# Patient Record
Sex: FEMALE | Race: WHITE | NOT HISPANIC OR LATINO | Employment: FULL TIME | URBAN - NONMETROPOLITAN AREA
[De-identification: names, ages, dates, MRNs, and addresses within clinical notes are randomized per-mention and may not be internally consistent; named-entity substitution may affect disease eponyms.]

---

## 2017-01-20 ENCOUNTER — OFFICE VISIT (OUTPATIENT)
Dept: FAMILY MEDICINE CLINIC | Facility: CLINIC | Age: 42
End: 2017-01-20

## 2017-01-20 VITALS
OXYGEN SATURATION: 97 % | HEART RATE: 98 BPM | SYSTOLIC BLOOD PRESSURE: 118 MMHG | DIASTOLIC BLOOD PRESSURE: 86 MMHG | WEIGHT: 232 LBS | TEMPERATURE: 97.5 F | BODY MASS INDEX: 38.65 KG/M2 | HEIGHT: 65 IN

## 2017-01-20 DIAGNOSIS — E78.49 OTHER HYPERLIPIDEMIA: ICD-10-CM

## 2017-01-20 DIAGNOSIS — J01.11 ACUTE RECURRENT FRONTAL SINUSITIS: ICD-10-CM

## 2017-01-20 DIAGNOSIS — I10 ESSENTIAL HYPERTENSION: ICD-10-CM

## 2017-01-20 DIAGNOSIS — M79.7 FIBROMYALGIA: Primary | ICD-10-CM

## 2017-01-20 PROCEDURE — 99214 OFFICE O/P EST MOD 30 MIN: CPT | Performed by: FAMILY MEDICINE

## 2017-01-20 RX ORDER — ATORVASTATIN CALCIUM 10 MG/1
10 TABLET, FILM COATED ORAL NIGHTLY
Qty: 30 TABLET | Refills: 5 | Status: SHIPPED | OUTPATIENT
Start: 2017-01-20 | End: 2017-06-05 | Stop reason: SDUPTHER

## 2017-01-20 RX ORDER — AMOXICILLIN AND CLAVULANATE POTASSIUM 500; 125 MG/1; MG/1
1 TABLET, FILM COATED ORAL 2 TIMES DAILY
Qty: 30 TABLET | Refills: 0 | Status: SHIPPED | OUTPATIENT
Start: 2017-01-20 | End: 2017-02-21

## 2017-01-20 RX ORDER — TIZANIDINE HYDROCHLORIDE 6 MG/1
6 CAPSULE, GELATIN COATED ORAL 3 TIMES DAILY PRN
Qty: 90 CAPSULE | Refills: 2 | Status: SHIPPED | OUTPATIENT
Start: 2017-01-20 | End: 2017-02-21

## 2017-01-20 RX ORDER — DULOXETIN HYDROCHLORIDE 30 MG/1
30 CAPSULE, DELAYED RELEASE ORAL DAILY
Qty: 30 CAPSULE | Refills: 0 | Status: SHIPPED | OUTPATIENT
Start: 2017-01-20 | End: 2017-02-21 | Stop reason: SINTOL

## 2017-01-20 RX ORDER — MELOXICAM 15 MG/1
15 TABLET ORAL DAILY
Qty: 30 TABLET | Refills: 0 | Status: SHIPPED | OUTPATIENT
Start: 2017-01-20 | End: 2017-02-21 | Stop reason: SDUPTHER

## 2017-01-20 NOTE — PROGRESS NOTES
"Subjective   Madalyn Villegas is a 41 y.o. female.     Sinus Problem   This is a recurrent problem. The current episode started more than 1 month ago. The problem has been gradually worsening since onset. There has been no fever. The pain is moderate. Associated symptoms include congestion, coughing, headaches and sinus pressure. Pertinent negatives include no chills, diaphoresis, ear pain, hoarse voice, neck pain, shortness of breath, sneezing, sore throat or swollen glands. (Rhinorrhea) Past treatments include acetaminophen (OTC cold meds). The treatment provided no relief.     H/o HTN and HLP. Taking meds as rx'd. Denies side effects.    Reports sciatic symptoms have improved. Still having diffuse muscle aches/pains, joint pain. Every day, assoc'd with muscle spasm, fatigue. Sleeping well. Zanaflex and ibuprofen help some.  TAking vit D for deficiency as rx'd.  She reports using \"biofreeze constantly\".      The following portions of the patient's history were reviewed and updated as appropriate: allergies, current medications, past family history, past medical history, past social history and past surgical history.    Review of Systems   Constitutional: Positive for fatigue and unexpected weight change. Negative for chills and diaphoresis.   HENT: Positive for congestion, postnasal drip, rhinorrhea and sinus pressure. Negative for ear pain, hoarse voice, mouth sores, nosebleeds, sneezing, sore throat and trouble swallowing.    Eyes: Negative for pain, redness, itching and visual disturbance.   Respiratory: Positive for cough. Negative for shortness of breath and wheezing.    Cardiovascular: Negative for chest pain, palpitations and leg swelling.   Gastrointestinal: Negative for abdominal pain, diarrhea, nausea and vomiting.   Endocrine: Negative for polydipsia and polyuria.   Genitourinary: Negative for dysuria and hematuria.   Musculoskeletal: Positive for arthralgias, back pain and myalgias. Negative for neck " pain.   Skin: Negative for rash and wound.   Neurological: Positive for headaches. Negative for dizziness, tremors, syncope and weakness.   Hematological: Negative for adenopathy. Does not bruise/bleed easily.   Psychiatric/Behavioral: Negative for dysphoric mood and sleep disturbance. The patient is not nervous/anxious.      Objective    Vitals:    01/20/17 0827   BP: 118/86   Pulse: 98   Temp: 97.5 °F (36.4 °C)   SpO2: 97%     Body mass index is 38.61 kg/(m^2).  Last 2 weights    01/20/17 0827   Weight: 232 lb (105 kg)     Physical Exam   Constitutional: She is oriented to person, place, and time. She appears well-developed and well-nourished. She is cooperative. She does not appear ill. No distress.   HENT:   Head: Normocephalic and atraumatic.   Right Ear: Tympanic membrane, external ear and ear canal normal.   Left Ear: Tympanic membrane, external ear and ear canal normal.   Nose: Mucosal edema and rhinorrhea (mucoid) present. Right sinus exhibits frontal sinus tenderness. Left sinus exhibits frontal sinus tenderness.   Mouth/Throat: Uvula is midline, oropharynx is clear and moist and mucous membranes are normal.   Eyes: Conjunctivae and lids are normal.   Neck: Phonation normal. Neck supple. Normal carotid pulses present. No thyroid mass and no thyromegaly present.   Cardiovascular: Normal rate, regular rhythm and intact distal pulses.    Pulmonary/Chest: Effort normal and breath sounds normal.   Musculoskeletal:   Multiple soft tissue tender points including upper ant chest, shoulders, elbows, paraspinals in cervical/lumbar spine, lateral hips/thighs, knees, shins. Overall normal ROM with mild stiffness.       Vascular Status -  Her exam exhibits no right foot edema. Her exam exhibits no left foot edema.  Lymphadenopathy:     She has no cervical adenopathy.   Neurological: She is alert and oriented to person, place, and time. She has normal strength and normal reflexes. She displays no tremor. No cranial  nerve deficit or sensory deficit. Gait normal.   Skin: Skin is warm and dry. No bruising and no rash noted.   Psychiatric: She has a normal mood and affect. Her behavior is normal. Cognition and memory are normal.   Nursing note and vitals reviewed.    Assessment/Plan   Madalyn was seen today for nasal congestion and med refill.    Diagnoses and all orders for this visit:    Fibromyalgia  -     DULoxetine (CYMBALTA) 30 MG capsule; Take 1 capsule by mouth Daily.  -     meloxicam (MOBIC) 15 MG tablet; Take 1 tablet by mouth Daily.    Acute recurrent frontal sinusitis  -     amoxicillin-clavulanate (AUGMENTIN) 500-125 MG per tablet; Take 1 tablet by mouth 2 (Two) Times a Day.    Essential hypertension    Other hyperlipidemia    Other orders  -     TiZANidine (ZANAFLEX) 6 MG capsule; Take 1 capsule by mouth 3 (Three) Times a Day As Needed for muscle spasms.  -     atorvastatin (LIPITOR) 10 MG tablet; Take 1 tablet by mouth Every Night.    Suspect she has fibromyalgia. Risks/benefit and potential side effects of tx options reviewed. She voiced understanding and wishes to proceed with Cymbalta, zanaflex, and mobic.  If minimal response to Cymbalta consider referral to rheumatology regarding alternative dx.  BP controlled.  HLP controlled with good tolerance of Lipitor.  Symptomatic tx of sinusitis reviewed.  F/U in 1 month, sooner as needed.  Patient was encouraged to keep me informed of any acute changes, lack of improvement, or any new concerning symptoms.  Patient voiced understanding of all instructions and denied further questions.

## 2017-01-20 NOTE — MR AVS SNAPSHOT
Madalyn Villegas   1/20/2017 8:15 AM   Office Visit    Dept Phone:  [phone]   Encounter #:  [id number]    Provider:  Latrice Santana MD   Department:  [facility] PRIMARY CARE                Your Full Care Plan              Today's Medication Changes          These changes are accurate as of: 1/20/17  9:00 AM.  If you have any questions, ask your nurse or doctor.               New Medication(s)Ordered:     DULoxetine 30 MG capsule   Commonly known as:  CYMBALTA   Take 1 capsule by mouth Daily.   Started by:  Latrice Santana MD       meloxicam 15 MG tablet   Commonly known as:  MOBIC   Take 1 tablet by mouth Daily.   Started by:  Latrice Santana MD         Stop taking medication(s)listed here:     ibuprofen 200 MG tablet   Commonly known as:  ADVIL,MOTRIN   Stopped by:  Latrice Santana MD           MUCUS RELIEF 400 MG tablet   Generic drug:  guaiFENesin   Stopped by:  Latrice Santana MD           traMADol 50 MG tablet   Commonly known as:  ULTRAM   Stopped by:  Latrice Santana MD                Where to Get Your Medications      These medications were sent to [facility] [address] [place] [address] [phone]  - [phone]   [address] [facility] [address]     Phone:  [phone]     amoxicillin-clavulanate 500-125 MG per tablet    atorvastatin 10 MG tablet    DULoxetine 30 MG capsule    meloxicam 15 MG tablet    TiZANidine 6 MG capsule                  Your Updated Medication List          This list is accurate as of: 1/20/17  9:00 AM.  Always use your most recent med list.                acetaminophen 500 MG tablet   Commonly known as:  TYLENOL       albuterol 108 (90 BASE) MCG/ACT inhaler   Commonly known as:  PROVENTIL HFA;VENTOLIN HFA   Inhale 2 puffs every 4 (four) hours as needed for wheezing.       amoxicillin-clavulanate 500-125 MG per tablet   Commonly known as:  AUGMENTIN   Take 1 tablet by mouth 2 (Two) Times a Day.       atorvastatin  10 MG tablet   Commonly known as:  LIPITOR   Take 1 tablet by mouth Every Night.       chlorthalidone 25 MG tablet   Commonly known as:  HYGROTON   Take 1 tablet by mouth Daily.       cholecalciferol 59903 UNITS capsule   Commonly known as:  VITAMIN D3   Take 1 capsule by mouth Daily.       DULoxetine 30 MG capsule   Commonly known as:  CYMBALTA   Take 1 capsule by mouth Daily.       esomeprazole 20 MG capsule   Commonly known as:  nexIUM       glucosamine-chondroitin 500-400 MG capsule capsule       meloxicam 15 MG tablet   Commonly known as:  MOBIC   Take 1 tablet by mouth Daily.       NASAL DECONGESTANT PO       NASONEX 50 MCG/ACT nasal spray   Generic drug:  mometasone       ONE DAILY ESSENTIAL PO       TiZANidine 6 MG capsule   Commonly known as:  ZANAFLEX   Take 1 capsule by mouth 3 (Three) Times a Day As Needed for muscle spasms.               You Were Diagnosed With        Codes Comments    Fibromyalgia    -  Primary ICD-10-CM: M79.7  ICD-9-CM: 729.1     Acute recurrent frontal sinusitis     ICD-10-CM: J01.11  ICD-9-CM: 461.1       Instructions     None    Patient Instructions History      Upcoming Appointments     Visit Type Date Time Department    FOLLOW UP 2017  8:15 AM [facility]      [employer] Signup     [employer] allows you to send messages to your doctor, view your test results, renew your prescriptions, schedule appointments, and more. To sign up, go to [employer] and click on the Sign Up Now link in the New User? box. Enter your [employer] Activation Code exactly as it appears below along with the last four digits of your Social Security Number and your Date of Birth () to complete the sign-up process. If you do not sign up before the expiration date, you must request a new code.    [employer] Activation Code: [email or web address]  Expires: 2/3/2017  9:00 AM    If you have questions, you can email [email or web address] or call [phone] to talk to our [employer]  "staff. Remember, MyChart is NOT to be used for urgent needs. For medical emergencies, dial 911.               Other Info from Your Visit           Allergies     No Known Allergies      Reason for Visit     Nasal Congestion sinus pressure    Med Refill           Vital Signs     Blood Pressure Pulse Temperature Height Weight Oxygen Saturation    118/86 98 97.5 °F (36.4 °C) 65\" (165.1 cm) 232 lb (105 kg) 97%    Body Mass Index Smoking Status                38.61 kg/m2 Current Every Day Smoker          Problems and Diagnoses Noted     Fibromyalgia    Acute frontal sinusitis            "

## 2017-02-21 ENCOUNTER — OFFICE VISIT (OUTPATIENT)
Dept: FAMILY MEDICINE CLINIC | Facility: CLINIC | Age: 42
End: 2017-02-21

## 2017-02-21 VITALS
WEIGHT: 234 LBS | HEIGHT: 65 IN | BODY MASS INDEX: 38.99 KG/M2 | HEART RATE: 92 BPM | DIASTOLIC BLOOD PRESSURE: 80 MMHG | SYSTOLIC BLOOD PRESSURE: 112 MMHG | OXYGEN SATURATION: 97 %

## 2017-02-21 DIAGNOSIS — R51.9 FACIAL PAIN: ICD-10-CM

## 2017-02-21 DIAGNOSIS — M79.7 FIBROMYALGIA: Primary | ICD-10-CM

## 2017-02-21 DIAGNOSIS — R09.81 CHRONIC NASAL CONGESTION: ICD-10-CM

## 2017-02-21 DIAGNOSIS — E78.49 OTHER HYPERLIPIDEMIA: ICD-10-CM

## 2017-02-21 DIAGNOSIS — I10 ESSENTIAL HYPERTENSION: ICD-10-CM

## 2017-02-21 DIAGNOSIS — E55.9 VITAMIN D DEFICIENCY: ICD-10-CM

## 2017-02-21 LAB
25(OH)D3+25(OH)D2 SERPL-MCNC: 40.4 NG/ML
ALBUMIN SERPL-MCNC: 4.5 G/DL (ref 3.5–5)
ALBUMIN/GLOB SERPL: 1.7 G/DL (ref 1–2)
ALP SERPL-CCNC: 82 U/L (ref 38–126)
ALT SERPL-CCNC: 41 U/L (ref 13–69)
AST SERPL-CCNC: 35 U/L (ref 15–46)
BILIRUB SERPL-MCNC: 0.7 MG/DL (ref 0.2–1.3)
BUN SERPL-MCNC: 15 MG/DL (ref 7–20)
BUN/CREAT SERPL: 25 (ref 7.1–23.5)
CALCIUM SERPL-MCNC: 10.5 MG/DL (ref 8.4–10.2)
CHLORIDE SERPL-SCNC: 102 MMOL/L (ref 98–107)
CHOLEST SERPL-MCNC: 188 MG/DL (ref 0–199)
CO2 SERPL-SCNC: 30 MMOL/L (ref 26–30)
CREAT SERPL-MCNC: 0.6 MG/DL (ref 0.6–1.3)
GLOBULIN SER CALC-MCNC: 2.7 GM/DL
GLUCOSE SERPL-MCNC: 101 MG/DL (ref 74–98)
HDLC SERPL-MCNC: 47 MG/DL (ref 40–60)
LDLC SERPL CALC-MCNC: 86 MG/DL (ref 0–99)
POTASSIUM SERPL-SCNC: 4.3 MMOL/L (ref 3.5–5.1)
PROT SERPL-MCNC: 7.2 G/DL (ref 6.3–8.2)
SODIUM SERPL-SCNC: 142 MMOL/L (ref 137–145)
TRIGL SERPL-MCNC: 276 MG/DL
VLDLC SERPL CALC-MCNC: 55.2 MG/DL

## 2017-02-21 PROCEDURE — 99214 OFFICE O/P EST MOD 30 MIN: CPT | Performed by: FAMILY MEDICINE

## 2017-02-21 RX ORDER — MELOXICAM 15 MG/1
15 TABLET ORAL DAILY
Qty: 30 TABLET | Refills: 0 | Status: SHIPPED | OUTPATIENT
Start: 2017-02-21 | End: 2017-05-22 | Stop reason: SDUPTHER

## 2017-02-21 RX ORDER — CYCLOBENZAPRINE HCL 10 MG
10 TABLET ORAL 3 TIMES DAILY PRN
Qty: 90 TABLET | Refills: 0 | Status: SHIPPED | OUTPATIENT
Start: 2017-02-21 | End: 2017-05-22

## 2017-02-21 NOTE — PROGRESS NOTES
Subjective   Madalyn Villegas is a 41 y.o. female.     History of Present Illness   Fibromyalgia: Patient here for follow up on fibromyalgia. Patient has a several months history of diffuse myofascial pain and fatigue: moderate: course over time: gradually worsening..  Onset was gradual. The symptoms are of moderate severity. They are made worse by: movement and overuse. They are helped by arthritis medications, heat, rest and muscle relaxants. The patient denies fever, truly inflamed joints, rash, localizing neurologic symptoms, unexplained weight loss. Previous treatments include OTC meds, prescription meds - see below, Physical Therapy and chiropractic therapy. Associated symptoms include arthralgia, fatigue, joint pain, morning stiffness and muscle weakness. Patient denies associated alopecia, bleeding/clotting problems, fevers, memory loss, nausea, new headache, nodules, oral ulcers, palpitations, pleurisy, rashes/photosensitive, Raynaud's and seizures. Evaluation to date includes lab eval. Recent interventions include medication changes, PT, etc.  Limitation on activities include none. At last visit was placed on CYmbalta. She does not feel it has helped her muscle pain and is causing diarrhea. sHe would like to stop it.  Also using mobic as needed which helps. Would like alternative muscle relaxant as tizanidine helps spasms but not generally soreness/tightness.    Has h/o vit D def. Is taking as rx'd.    Hypertension: Patient here for follow-up of elevated blood pressure. She is intermittently exercising and is fairly adherent to low salt diet.  Blood pressure is well controlled at home. Cardiac symptoms fatigue. Patient denies chest pain, claudication, cough, dyspnea, exertional chest pressure/discomfort, irregular heart beat, lower extremity edema, orthopnea, palpitations, syncope and tachypnea.  Cardiovascular risk factors: dyslipidemia, hypertension, obesity (BMI >= 30 kg/m2) and sedentary lifestyle.  "Use of agents associated with hypertension: NSAIDS. History of target organ damage: none. Taking meds as rx'd.    Hyperlipidemia: Patient presents for f/u of hyperlipidemia.  She was tested because of comorbid hypertension, obesity.  Her last labs reviewed on chart. Previously tx included heart healthy diet and increased exercise. She has not complied with these recommendations. Cardiac symptoms as above. There is a family history of hyperlipidemia. There is a family history of early ischemia heart disease.    She c/o cont'd sinus \"pressure\", congestion, post nasal drip. Worse with pressure changes.    The following portions of the patient's history were reviewed and updated as appropriate: allergies, current medications, past family history, past medical history, past social history, past surgical history and problem list.    Review of Systems   Constitutional: Positive for fatigue. Negative for chills and diaphoresis.   HENT: Positive for congestion, postnasal drip, rhinorrhea and sinus pressure. Negative for ear pain, mouth sores, nosebleeds, sneezing, sore throat and trouble swallowing.    Eyes: Positive for pain and visual disturbance. Negative for redness and itching.   Respiratory: Positive for cough. Negative for shortness of breath and wheezing.    Cardiovascular: Negative for chest pain, palpitations and leg swelling.   Gastrointestinal: Negative for abdominal pain, diarrhea, nausea and vomiting.   Endocrine: Negative for polydipsia and polyuria.   Genitourinary: Negative for dysuria and hematuria.   Musculoskeletal: Positive for arthralgias, back pain and myalgias. Negative for neck pain.   Skin: Negative for rash and wound.   Neurological: Positive for headaches. Negative for dizziness, tremors, syncope and weakness.   Hematological: Negative for adenopathy. Does not bruise/bleed easily.   Psychiatric/Behavioral: Negative for dysphoric mood and sleep disturbance. The patient is not nervous/anxious.  "     Objective    Vitals:    02/21/17 0810   BP: 112/80   Pulse: 92   SpO2: 97%     Body mass index is 38.94 kg/(m^2).  Last 2 weights    02/21/17 0810   Weight: 234 lb (106 kg)     Physical Exam   Constitutional: She is oriented to person, place, and time. She appears well-developed and well-nourished. She is cooperative. She does not appear ill. No distress.   HENT:   Head: Normocephalic and atraumatic.   Right Ear: Tympanic membrane, external ear and ear canal normal.   Left Ear: Tympanic membrane, external ear and ear canal normal.   Nose: Mucosal edema present. No rhinorrhea. Right sinus exhibits maxillary sinus tenderness and frontal sinus tenderness. Left sinus exhibits maxillary sinus tenderness and frontal sinus tenderness.   Mouth/Throat: Uvula is midline, oropharynx is clear and moist and mucous membranes are normal.   Eyes: Conjunctivae, EOM and lids are normal. Pupils are equal, round, and reactive to light.   Neck: Phonation normal. Neck supple. Normal carotid pulses present. No thyroid mass and no thyromegaly present.   Cardiovascular: Normal rate, regular rhythm and intact distal pulses.    Pulmonary/Chest: Effort normal and breath sounds normal.   Musculoskeletal:   Multiple soft tissue tender points including upper ant chest, shoulders, elbows, paraspinals in cervical/lumbar spine, lateral hips/thighs, knees, shins. Overall normal ROM with mild stiffness.       Vascular Status -  Her exam exhibits no right foot edema. Her exam exhibits no left foot edema.  Lymphadenopathy:     She has no cervical adenopathy.   Neurological: She is alert and oriented to person, place, and time. She has normal strength. She displays no tremor. No cranial nerve deficit or sensory deficit. Gait normal.   Skin: Skin is warm and dry. No bruising and no rash noted.   Psychiatric: She has a normal mood and affect. Her behavior is normal. Cognition and memory are normal.   Nursing note and vitals  reviewed.    Assessment/Plan   Madalyn was seen today for muscle pain and sinus problem.    Diagnoses and all orders for this visit:    Fibromyalgia  -     cyclobenzaprine (FLEXERIL) 10 MG tablet; Take 1 tablet by mouth 3 (Three) Times a Day As Needed for muscle spasms.  -     meloxicam (MOBIC) 15 MG tablet; Take 1 tablet by mouth Daily.    Essential hypertension  -     Comprehensive Metabolic Panel    Vitamin D deficiency  -     Vitamin D 25 Hydroxy    Other hyperlipidemia  -     Lipid Panel  -     Comprehensive Metabolic Panel    Facial pain  -     XR Sinuses 3+ View; Future    Chronic nasal congestion  -     XR Sinuses 3+ View; Future    Trial of flexeril in place of tizanidine. Risks, benefits, and potential side effects of new medications reviewed with patient.  Patient voiced understanding and wished to proceed with treatment.  Otherwise continue current medications.  I will contact patient regarding test results and provide instructions regarding any necessary changes in plan of care.  Patient was encouraged to keep me informed of any acute changes, lack of improvement, or any new concerning symptoms.  Routine f/u in 3 months, sooner as needed/instructed.  Patient voiced understanding of all instructions and denied further questions.

## 2017-04-24 DIAGNOSIS — I10 ESSENTIAL HYPERTENSION: ICD-10-CM

## 2017-04-24 RX ORDER — CHLORTHALIDONE 25 MG/1
TABLET ORAL
Qty: 30 TABLET | Refills: 0 | Status: SHIPPED | OUTPATIENT
Start: 2017-04-24 | End: 2017-05-22 | Stop reason: SDUPTHER

## 2017-05-22 ENCOUNTER — OFFICE VISIT (OUTPATIENT)
Dept: FAMILY MEDICINE CLINIC | Facility: CLINIC | Age: 42
End: 2017-05-22

## 2017-05-22 VITALS
TEMPERATURE: 97.5 F | DIASTOLIC BLOOD PRESSURE: 78 MMHG | WEIGHT: 235 LBS | HEART RATE: 98 BPM | HEIGHT: 65 IN | SYSTOLIC BLOOD PRESSURE: 112 MMHG | BODY MASS INDEX: 39.15 KG/M2 | OXYGEN SATURATION: 97 %

## 2017-05-22 DIAGNOSIS — J98.8 RECURRENT RESPIRATORY INFECTION: ICD-10-CM

## 2017-05-22 DIAGNOSIS — J01.80 ACUTE NON-RECURRENT SINUSITIS OF OTHER SINUS: ICD-10-CM

## 2017-05-22 DIAGNOSIS — E78.49 OTHER HYPERLIPIDEMIA: ICD-10-CM

## 2017-05-22 DIAGNOSIS — Z72.0 TOBACCO ABUSE: ICD-10-CM

## 2017-05-22 DIAGNOSIS — J32.9 RECURRENT RHINOSINUSITIS: ICD-10-CM

## 2017-05-22 DIAGNOSIS — I10 ESSENTIAL HYPERTENSION: ICD-10-CM

## 2017-05-22 DIAGNOSIS — M79.7 FIBROMYALGIA: ICD-10-CM

## 2017-05-22 DIAGNOSIS — J20.8 ACUTE BRONCHITIS DUE TO OTHER SPECIFIED ORGANISMS: Primary | ICD-10-CM

## 2017-05-22 DIAGNOSIS — E55.9 VITAMIN D DEFICIENCY: ICD-10-CM

## 2017-05-22 DIAGNOSIS — R73.01 IFG (IMPAIRED FASTING GLUCOSE): ICD-10-CM

## 2017-05-22 PROCEDURE — 96372 THER/PROPH/DIAG INJ SC/IM: CPT | Performed by: FAMILY MEDICINE

## 2017-05-22 PROCEDURE — 99214 OFFICE O/P EST MOD 30 MIN: CPT | Performed by: FAMILY MEDICINE

## 2017-05-22 RX ORDER — DOXYCYCLINE 100 MG/1
100 TABLET ORAL 2 TIMES DAILY
Qty: 20 TABLET | Refills: 0 | Status: SHIPPED | OUTPATIENT
Start: 2017-05-22 | End: 2017-06-01

## 2017-05-22 RX ORDER — TIZANIDINE HYDROCHLORIDE 6 MG/1
6 CAPSULE, GELATIN COATED ORAL 2 TIMES DAILY
Qty: 60 CAPSULE | Refills: 5 | Status: SHIPPED | OUTPATIENT
Start: 2017-05-22 | End: 2017-12-11 | Stop reason: SDUPTHER

## 2017-05-22 RX ORDER — CHLORTHALIDONE 25 MG/1
25 TABLET ORAL DAILY
Qty: 30 TABLET | Refills: 5 | Status: SHIPPED | OUTPATIENT
Start: 2017-05-22 | End: 2017-11-19 | Stop reason: SDUPTHER

## 2017-05-22 RX ORDER — METHYLPREDNISOLONE ACETATE 80 MG/ML
120 INJECTION, SUSPENSION INTRA-ARTICULAR; INTRALESIONAL; INTRAMUSCULAR; SOFT TISSUE ONCE
Status: COMPLETED | OUTPATIENT
Start: 2017-05-22 | End: 2017-05-22

## 2017-05-22 RX ORDER — TIZANIDINE HYDROCHLORIDE 6 MG/1
CAPSULE, GELATIN COATED ORAL
COMMUNITY
Start: 2017-04-25 | End: 2017-05-22 | Stop reason: SDUPTHER

## 2017-05-22 RX ORDER — MELOXICAM 15 MG/1
15 TABLET ORAL DAILY
Qty: 30 TABLET | Refills: 5 | Status: SHIPPED | OUTPATIENT
Start: 2017-05-22 | End: 2017-11-27 | Stop reason: SDUPTHER

## 2017-05-22 RX ADMIN — METHYLPREDNISOLONE ACETATE 120 MG: 80 INJECTION, SUSPENSION INTRA-ARTICULAR; INTRALESIONAL; INTRAMUSCULAR; SOFT TISSUE at 09:05

## 2017-05-23 LAB
25(OH)D3+25(OH)D2 SERPL-MCNC: 44.3 NG/ML
CHOLEST SERPL-MCNC: 198 MG/DL (ref 0–199)
HBA1C MFR BLD: 5.7 %
HDLC SERPL-MCNC: 46 MG/DL (ref 40–60)
LDLC SERPL CALC-MCNC: 99 MG/DL (ref 0–99)
TRIGL SERPL-MCNC: 265 MG/DL
VLDLC SERPL CALC-MCNC: 53 MG/DL

## 2017-05-29 ENCOUNTER — OFFICE VISIT (OUTPATIENT)
Dept: RETAIL CLINIC | Facility: CLINIC | Age: 42
End: 2017-05-29

## 2017-05-29 VITALS
WEIGHT: 230 LBS | SYSTOLIC BLOOD PRESSURE: 140 MMHG | HEART RATE: 90 BPM | TEMPERATURE: 97.5 F | DIASTOLIC BLOOD PRESSURE: 86 MMHG | BODY MASS INDEX: 38.32 KG/M2 | OXYGEN SATURATION: 97 % | HEIGHT: 65 IN | RESPIRATION RATE: 18 BRPM

## 2017-05-29 DIAGNOSIS — R42 LIGHTHEADEDNESS: ICD-10-CM

## 2017-05-29 DIAGNOSIS — J01.40 ACUTE PANSINUSITIS, RECURRENCE NOT SPECIFIED: Primary | ICD-10-CM

## 2017-05-29 LAB
EXPIRATION DATE: NORMAL
GLUCOSE BLDC GLUCOMTR-MCNC: 74 MG/DL (ref 70–130)
INTERNAL CONTROL: NORMAL
Lab: NORMAL
S PYO AG THROAT QL: NEGATIVE

## 2017-05-29 PROCEDURE — 99213 OFFICE O/P EST LOW 20 MIN: CPT | Performed by: NURSE PRACTITIONER

## 2017-05-29 PROCEDURE — 87880 STREP A ASSAY W/OPTIC: CPT | Performed by: NURSE PRACTITIONER

## 2017-05-29 PROCEDURE — 82962 GLUCOSE BLOOD TEST: CPT | Performed by: NURSE PRACTITIONER

## 2017-05-29 RX ORDER — PREDNISONE 10 MG/1
TABLET ORAL
Qty: 21 TABLET | Refills: 0 | Status: SHIPPED | OUTPATIENT
Start: 2017-05-29 | End: 2017-06-05

## 2017-06-05 ENCOUNTER — OFFICE VISIT (OUTPATIENT)
Dept: FAMILY MEDICINE CLINIC | Facility: CLINIC | Age: 42
End: 2017-06-05

## 2017-06-05 VITALS
WEIGHT: 238 LBS | SYSTOLIC BLOOD PRESSURE: 124 MMHG | BODY MASS INDEX: 39.65 KG/M2 | DIASTOLIC BLOOD PRESSURE: 78 MMHG | HEART RATE: 86 BPM | OXYGEN SATURATION: 98 % | HEIGHT: 65 IN

## 2017-06-05 DIAGNOSIS — J98.01 BRONCHOSPASM: ICD-10-CM

## 2017-06-05 DIAGNOSIS — I10 ESSENTIAL HYPERTENSION: ICD-10-CM

## 2017-06-05 DIAGNOSIS — J98.8 RECURRENT RESPIRATORY INFECTION: Primary | ICD-10-CM

## 2017-06-05 DIAGNOSIS — R73.01 IFG (IMPAIRED FASTING GLUCOSE): ICD-10-CM

## 2017-06-05 DIAGNOSIS — M79.7 FIBROMYALGIA: ICD-10-CM

## 2017-06-05 DIAGNOSIS — E78.49 OTHER HYPERLIPIDEMIA: ICD-10-CM

## 2017-06-05 LAB
FEV1/FVC: 108 %
FEV1: (no result) LITERS
FVC VOL RESPIRATORY: (no result) LITERS

## 2017-06-05 PROCEDURE — 99214 OFFICE O/P EST MOD 30 MIN: CPT | Performed by: FAMILY MEDICINE

## 2017-06-05 PROCEDURE — 94010 BREATHING CAPACITY TEST: CPT | Performed by: FAMILY MEDICINE

## 2017-06-05 RX ORDER — ATORVASTATIN CALCIUM 10 MG/1
10 TABLET, FILM COATED ORAL NIGHTLY
Qty: 30 TABLET | Refills: 5 | Status: SHIPPED | OUTPATIENT
Start: 2017-06-05 | End: 2017-11-27 | Stop reason: SDUPTHER

## 2017-06-05 ASSESSMENT — PULMONARY FUNCTION TESTS: FEV1/FVC: 108

## 2017-06-05 NOTE — PROGRESS NOTES
Subjective   Madalyn Villegas is a 42 y.o. female.     History of Present Illness   Ms. Villegas presents today for f/u on several chronic conditions.    Fibromyalgia: Patient here for follow up on fibromyalgia. Patient has a several months history of diffuse myofascial pain and fatigue: moderate: course over time: gradually worsening.. Onset was gradual. The symptoms are of moderate severity. They are made worse by: movement and overuse. They are helped by arthritis medications, heat, rest and muscle relaxants. The patient denies fever, truly inflamed joints, rash, localizing neurologic symptoms, unexplained weight loss. Previous treatments include OTC meds, prescription meds - see below, Physical Therapy and chiropractic therapy. Associated symptoms include arthralgia, fatigue, joint pain, morning stiffness and muscle weakness. Patient denies associated alopecia, bleeding/clotting problems, fevers, memory loss, nausea, new headache, nodules, oral ulcers, palpitations, pleurisy, rashes/photosensitive, Raynaud's and seizures. Evaluation to date includes lab eval. Recent interventions include medication changes, PT, etc. Limitation on activities include none. Has failed CYmbalta due to diarrhea. Also using mobic and muscle relaxant as needed which helps.  Has h/o vit D def. Is taking as rx'd.      Hypertension: Patient here for follow-up of elevated blood pressure. She is intermittently exercising and is fairly adherent to low salt diet. Blood pressure is well controlled at home. Cardiac symptoms fatigue. Patient denies chest pain, claudication, cough, dyspnea, exertional chest pressure/discomfort, irregular heart beat, lower extremity edema, orthopnea, palpitations, syncope and tachypnea. Cardiovascular risk factors: dyslipidemia, hypertension, obesity (BMI >= 30 kg/m2) and sedentary lifestyle. Use of agents associated with hypertension: NSAIDS. History of target organ damage: none. Taking meds as rx'd. Assoc'd  pre-diabetes.      Hyperlipidemia: Patient presents for f/u of hyperlipidemia. She was tested because of comorbid hypertension, obesity. Her last labs reviewed on chart. Previously tx included heart healthy diet and increased exercise. She has not complied with these recommendations. Cardiac symptoms as above. There is a family history of hyperlipidemia. There is a family history of early ischemia heart disease. SHe is taking statin as rx'd. Denies side effects.    She continues to have intermittent dry cough. Was encouraged to f/u for PFT. Has recurrent bronchitis/URI/sinusitis episodes at least 3-4 per year assoc'd with marked bronchospasm. She denies h/o asthma, COPD. Nephew with asthma. C/o chronic nasal congestion, postnasal drip. Has night-time cough which waxes/wanes. Has apt later this month with allergist.    The following portions of the patient's history were reviewed and updated as appropriate: allergies, current medications, past family history, past medical history, past social history, past surgical history and problem list.    Review of Systems   Constitutional: Positive for fatigue. Negative for fever and unexpected weight change.   HENT: Positive for congestion, nosebleeds, postnasal drip and rhinorrhea. Negative for ear pain, mouth sores, sore throat and trouble swallowing.    Eyes: Positive for itching. Negative for discharge.   Respiratory: Positive for cough, shortness of breath (with exertion) and wheezing (mild).    Cardiovascular: Positive for leg swelling. Negative for chest pain and palpitations.   Gastrointestinal: Negative.    Genitourinary: Negative for dysuria and hematuria.   Musculoskeletal: Positive for arthralgias and myalgias.   Skin: Negative for rash and wound.   Allergic/Immunologic: Positive for environmental allergies.   Neurological: Positive for numbness (burning in bilateral feet) and headaches. Negative for syncope and weakness.   Hematological: Negative for adenopathy.  Bruises/bleeds easily.   Psychiatric/Behavioral: Negative.        Objective    Vitals:    06/05/17 1111   BP: 124/78   Pulse: 86   SpO2: 98%     Body mass index is 39.61 kg/(m^2).  Last 2 weights    06/05/17  1111   Weight: 238 lb (108 kg)       Physical Exam   Constitutional: She is oriented to person, place, and time. She appears well-developed and well-nourished. She is cooperative. She does not appear ill. No distress.   HENT:   Head: Normocephalic and atraumatic.   Right Ear: Tympanic membrane, external ear and ear canal normal.   Left Ear: Tympanic membrane, external ear and ear canal normal.   Nose: Mucosal edema present. No rhinorrhea.   Mouth/Throat: Oropharynx is clear and moist and mucous membranes are normal. No oral lesions. No posterior oropharyngeal erythema.   Eyes: Conjunctivae, EOM and lids are normal.   Neck: Phonation normal. Neck supple. Normal carotid pulses present. No thyroid mass and no thyromegaly present.   Cardiovascular: Normal rate, regular rhythm and intact distal pulses.    Pulmonary/Chest: Effort normal and breath sounds normal.   Musculoskeletal:   Multiple soft tissue tender points including upper ant chest, shoulders, elbows, paraspinals in cervical/lumbar spine, lateral hips/thighs, knees, shins. Overall normal ROM with mild stiffness.       Vascular Status -  Her exam exhibits no right foot edema. Her exam exhibits no left foot edema.  Lymphadenopathy:     She has no cervical adenopathy.   Neurological: She is alert and oriented to person, place, and time. She has normal strength. She displays no tremor. No cranial nerve deficit or sensory deficit. Gait normal.   Skin: Skin is warm and dry. No bruising and no rash noted.   Psychiatric: She has a normal mood and affect. Her behavior is normal. Cognition and memory are normal.   Nursing note and vitals reviewed.    Spirometry results reviewed with pt and scanned to chart. FEV1 normal.    Recent Results (from the past 672 hour(s))    Hemoglobin A1c    Collection Time: 05/22/17  9:06 AM   Result Value Ref Range    Hemoglobin A1C 5.70 %   Lipid Panel    Collection Time: 05/22/17  9:06 AM   Result Value Ref Range    Total Cholesterol 198 0 - 199 mg/dL    Triglycerides 265 (H) <150 mg/dL    HDL Cholesterol 46 40 - 60 mg/dL    VLDL Cholesterol 53 mg/dL    LDL Cholesterol  99 0 - 99 mg/dL   Vitamin D 25 Hydroxy    Collection Time: 05/22/17  9:06 AM   Result Value Ref Range    25 Hydroxy, Vitamin D 44.3 ng/mL   POC Rapid Strep A    Collection Time: 05/29/17 10:39 AM   Result Value Ref Range    Rapid Strep A Screen Negative Negative, VALID, INVALID, Not Performed    Internal Control Passed Passed    Lot Number [id number]     Expiration Date 09/30/18    POC Glucose Fingerstick    Collection Time: 05/29/17 10:39 AM   Result Value Ref Range    Glucose 74 70 - 130 mg/dL     Labs reviewed with pt.    Assessment/Plan   Madalyn was seen today for hypertension, hyperlipidemia and prediabetes.    Diagnoses and all orders for this visit:    Essential hypertension    Other hyperlipidemia    IFG (impaired fasting glucose)    Fibromyalgia    Recurrent respiratory infection  -     Pulmonary Function Test    Bronchospasm  -     Pulmonary Function Test    Other orders  -     atorvastatin (LIPITOR) 10 MG tablet; Take 1 tablet by mouth Every Night.    BP controlled.  HLP with good tolerance of statin.  Prediabetes. Nutrition and activity goals reviewed including: mainly water to drink, limit white flour/processed sugar, high protein, high fiber carbs, good breakfast, working toward 150 mins cardio per week, weight training 2x/week  Recurrent respiratory infections with suspicion for allergy triggered asthma. F/U with allergist as scheduled.  Smoking cessation advised.  Pt voiced understanding of health risks of cont' smoking.  FMSx stable.  Routine f/u in 3 months, sooner as needed.  She is also encouraged to schedule routine pap/breast exam at earliest  convenience.  Patient was encouraged to keep me informed of any acute changes, lack of improvement, or any new concerning symptoms.  Pt is aware of reasons to seek emergent care.  Patient voiced understanding of all instructions and denied further questions.

## 2017-07-24 ENCOUNTER — PROCEDURE VISIT (OUTPATIENT)
Dept: FAMILY MEDICINE CLINIC | Facility: CLINIC | Age: 42
End: 2017-07-24

## 2017-07-24 VITALS
HEIGHT: 65 IN | DIASTOLIC BLOOD PRESSURE: 86 MMHG | SYSTOLIC BLOOD PRESSURE: 122 MMHG | BODY MASS INDEX: 39.82 KG/M2 | OXYGEN SATURATION: 98 % | HEART RATE: 77 BPM | WEIGHT: 239 LBS

## 2017-07-24 DIAGNOSIS — Z01.419 ENCOUNTER FOR ROUTINE GYNECOLOGICAL EXAMINATION WITH PAPANICOLAOU SMEAR OF CERVIX: Primary | ICD-10-CM

## 2017-07-24 DIAGNOSIS — R19.7 DIARRHEA, UNSPECIFIED TYPE: ICD-10-CM

## 2017-07-24 DIAGNOSIS — R19.5 HEME POSITIVE STOOL: ICD-10-CM

## 2017-07-24 DIAGNOSIS — R10.2 PELVIC PAIN: ICD-10-CM

## 2017-07-24 DIAGNOSIS — Z12.39 SCREENING BREAST EXAMINATION: ICD-10-CM

## 2017-07-24 PROCEDURE — 99396 PREV VISIT EST AGE 40-64: CPT | Performed by: FAMILY MEDICINE

## 2017-07-24 RX ORDER — MONTELUKAST SODIUM 10 MG/1
10 TABLET ORAL DAILY PRN
COMMUNITY
Start: 2017-07-18 | End: 2021-08-04

## 2017-07-24 RX ORDER — LEVOCETIRIZINE DIHYDROCHLORIDE 5 MG/1
5 TABLET, FILM COATED ORAL EVERY EVENING
COMMUNITY
End: 2018-07-23

## 2017-07-24 NOTE — PROGRESS NOTES
"Subjective   Madalyn Villegas is a 42 y.o. female.     History of Present Illness  Ms. Villegas presents today for routine pap and breast exam.  Reproductive History  Sexual Activity: in past   Contraception: abstinence/barrier  Menses: regular, getting heavier  H/O abnormal pap: no  Cervical procedures: none    Social Hx  Marital status: single  Tobacco use: every day smoker  EtOH use: denies  Illicit drug use: denies    Lifestyle  Diet: generally unhealthy  Exercise: none regularly  Using seatbelt: ues  Sunscreen: yes  Mental Health: stable  Feels safe in home: yes    Screenings  Last pap: cannot recall, few years?  Last breast exam: same as above  Last mammogram: never  Colonoscopy: never  DEXA: n/a  FLP: <1 year  BG/A1c: <5 years  Vitamin D: <1 yr  Last dental check up: irregular  Last vision exam: approx 1 year  Immunizations UTD: no Needs Pneumococcal    She c/o lower abd pain as well as diarrhea with menses and \"ovulation\". She denies weight loss, blood in stool, n/v. Does have occ reflux symptoms.     The following portions of the patient's history were reviewed and updated as appropriate: allergies, current medications, past family history, past medical history, past social history, past surgical history and problem list.    Review of Systems   Constitutional: Positive for fatigue. Negative for fever and unexpected weight change.   HENT: Positive for congestion and postnasal drip. Negative for ear pain, mouth sores, sore throat and trouble swallowing.    Eyes: Positive for itching. Negative for discharge.   Respiratory: Positive for cough and shortness of breath (with exertion).    Cardiovascular: Positive for leg swelling. Negative for chest pain and palpitations.   Gastrointestinal: Positive for diarrhea. Negative for blood in stool, nausea and vomiting.   Genitourinary: Positive for pelvic pain and vaginal pain. Negative for dysuria, frequency, genital sores, hematuria, urgency and vaginal discharge. "   Musculoskeletal: Positive for arthralgias and myalgias.   Skin: Negative for rash and wound.   Neurological: Positive for numbness (burning in bilateral feet) and headaches. Negative for syncope and weakness.   Hematological: Negative for adenopathy. Bruises/bleeds easily.   Psychiatric/Behavioral: Negative.        Objective    Vitals:    07/24/17 0824   BP: 122/86   Pulse: 77   SpO2: 98%     Body mass index is 39.77 kg/(m^2).  Last 2 weights    07/24/17 0824   Weight: 239 lb (108 kg)       Physical Exam   Constitutional: She is oriented to person, place, and time. She appears well-developed and well-nourished. She is cooperative. She does not appear ill. No distress.   obese   HENT:   Head: Normocephalic and atraumatic.   Mouth/Throat: Oropharynx is clear and moist and mucous membranes are normal. Mucous membranes are not dry. No oral lesions.   Eyes: Conjunctivae and lids are normal.   Neck: Phonation normal. Neck supple. Normal carotid pulses present. No thyroid mass and no thyromegaly present.   Cardiovascular: Normal rate, regular rhythm and intact distal pulses.    Pulmonary/Chest: Effort normal and breath sounds normal. Right breast exhibits skin change. Right breast exhibits no inverted nipple, no mass, no nipple discharge and no tenderness. Left breast exhibits skin change. Left breast exhibits no inverted nipple, no mass, no nipple discharge and no tenderness.   Large pendulous breasts with chronic skin discoloration.   Abdominal: Soft. She exhibits distension (mild). She exhibits no mass (exam limited by body habitus). Bowel sounds are decreased. There is no hepatosplenomegaly (exam limited by body habitus). There is tenderness (mild) in the right lower quadrant, suprapubic area and left lower quadrant. There is no rigidity, no rebound, no guarding and no CVA tenderness.   Genitourinary: Vagina normal. Rectal exam shows guaiac positive stool. Rectal exam shows no external hemorrhoid, no internal  hemorrhoid, no fissure, no mass and anal tone normal. Pelvic exam was performed with patient supine. There is no rash or lesion on the right labia. There is no rash or lesion on the left labia. Uterus is not enlarged and not tender. Cervix exhibits no motion tenderness, no discharge and no friability. Right adnexum displays no mass, no tenderness and no fullness. Left adnexum displays no mass, no tenderness and no fullness. No erythema, tenderness or bleeding in the vagina. No vaginal discharge found.   Genitourinary Comments: Bimanual exam limited by body habitus.       Vascular Status -  Her exam exhibits no right foot edema. Her exam exhibits no left foot edema.  Lymphadenopathy:        Head (left side): Submental adenopathy present.     She has no cervical adenopathy.     She has no axillary adenopathy.   Neurological: She is alert and oriented to person, place, and time. She has normal strength. She displays no tremor. Gait normal.   Skin: Skin is warm and dry. No bruising and no rash noted.   Psychiatric: She has a normal mood and affect. Her behavior is normal. Cognition and memory are normal.   Nursing note and vitals reviewed.    Most recent labs reviewed on chart.    Assessment/Plan   Madalyn was seen today for annual exam.    Diagnoses and all orders for this visit:    Encounter for routine gynecological examination with Papanicolaou smear of cervix  Comments:  Thin Prep pap obtained and pending.    Screening breast examination  Comments:  Pt declines mammogram.     Heme positive stool  -     Ambulatory Referral to General Surgery    Pelvic pain  Comments:  No tenderness on bimanual, but TTP as noted above on abd exam. Colonoscopy referral as above. Consider TVUS/CT etc.    Diarrhea, unspecified type  -     Ambulatory Referral to General Surgery    I will contact patient regarding test results and provide instructions regarding any necessary changes in plan of care.  Age appropriate preventive care  reviewed.  She declines mammogram as well as pneumococcal vaccination.  Smoking cessation advised.  Pt voiced understanding of health risks of cont'd smoking.  Routine f/u in 6 weeks, sooner as needed/instructed.  Patient was encouraged to keep me informed of any acute changes, lack of improvement, or any new concerning symptoms.  Pt is aware of reasons to seek emergent care.  Patient voiced understanding of all instructions and denied further questions.

## 2017-07-27 ENCOUNTER — OFFICE VISIT (OUTPATIENT)
Dept: SURGERY | Facility: CLINIC | Age: 42
End: 2017-07-27

## 2017-07-27 VITALS
HEART RATE: 80 BPM | BODY MASS INDEX: 39.82 KG/M2 | SYSTOLIC BLOOD PRESSURE: 118 MMHG | HEIGHT: 65 IN | WEIGHT: 239 LBS | TEMPERATURE: 97.4 F | OXYGEN SATURATION: 98 % | DIASTOLIC BLOOD PRESSURE: 80 MMHG

## 2017-07-27 DIAGNOSIS — R10.31 RIGHT LOWER QUADRANT ABDOMINAL PAIN: Primary | ICD-10-CM

## 2017-07-27 DIAGNOSIS — K62.5 RECTAL BLEED: ICD-10-CM

## 2017-07-27 PROCEDURE — 99214 OFFICE O/P EST MOD 30 MIN: CPT | Performed by: SURGERY

## 2017-07-27 NOTE — PROGRESS NOTES
Patient: Madalyn Villegas    YOB: 1975    Date: 07/27/2017    Primary Care Provider: Latrice Santana MD    Reason for Consultation: Colonoscopy    Chief complaint:   Chief Complaint   Patient presents with   • Colonoscopy     rectal bleeding       Subjective .     History of present illness:  I saw the patient in the office today as a colonoscopy consultation. Patient complains of RLQ and LLQ cramping pain.  She has a lot of diarrhea and gas during her period that is reduced with gas pills.  Her PCP noticed occult blood in her stool during her recent pap smear. Menstrual cycle seems to make this worse, nothing seems to relieve this.    Review of Systems   Constitutional: Negative for chills, diaphoresis, fatigue and fever.   HENT: Negative for dental problem, drooling, ear discharge and hearing loss.    Respiratory: Negative for cough, choking, chest tightness and shortness of breath.    Cardiovascular: Negative for chest pain, palpitations and leg swelling.   Gastrointestinal: Positive for abdominal pain, blood in stool and rectal pain.   Endocrine: Negative for cold intolerance and heat intolerance.   Genitourinary: Negative for flank pain, frequency and hematuria.   Neurological: Negative for seizures, light-headedness, numbness and headaches.   Psychiatric/Behavioral: Negative for agitation, behavioral problems and confusion.       History:  Past Medical History:   Diagnosis Date   • Chronic bronchitis    • GERD (gastroesophageal reflux disease)    • Hyperlipidemia    • Kidney stones    • Migraine        Past Surgical History:   Procedure Laterality Date   • KNEE ACL RECONSTRUCTION Right 07/2010       Family History   Problem Relation Age of Onset   • Arthritis Mother    • Hyperlipidemia Mother    • Hypertension Mother    • Obesity Mother    • Heart attack Mother 64   • Hypertension Father    • Heart disease Father    • Heart attack Sister 34   • Hyperlipidemia Sister    • Diabetes Sister    •  Hypertension Sister    • Obesity Sister    • Heart disease Sister    • Heart failure Sister    • Obesity Sister    • Diabetes Sister    • Diabetes type II Sister    • Stomach cancer Maternal Uncle    • Cancer Maternal Grandfather        Social History   Substance Use Topics   • Smoking status: Current Every Day Smoker     Packs/day: 0.75     Types: Cigarettes   • Smokeless tobacco: Never Used   • Alcohol use No       Allergies:  No Known Allergies    Medications:    Current Outpatient Prescriptions:   •  acetaminophen (TYLENOL) 500 MG tablet, Take 500 mg by mouth every 6 (six) hours as needed for mild pain (1-3)., Disp: , Rfl:   •  albuterol (PROVENTIL HFA;VENTOLIN HFA) 108 (90 BASE) MCG/ACT inhaler, Inhale 2 puffs every 4 (four) hours as needed for wheezing., Disp: 18 g, Rfl: 0  •  atorvastatin (LIPITOR) 10 MG tablet, Take 1 tablet by mouth Every Night., Disp: 30 tablet, Rfl: 5  •  budesonide (RINOCORT AQUA) 32 MCG/ACT nasal spray, 1 spray into each nostril Daily., Disp: , Rfl:   •  chlorthalidone (HYGROTON) 25 MG tablet, Take 1 tablet by mouth Daily., Disp: 30 tablet, Rfl: 5  •  cholecalciferol (VITAMIN D3) 19384 UNITS capsule, Take 1 capsule by mouth Daily., Disp: 30 capsule, Rfl: 2  •  esomeprazole (NexIUM) 20 MG capsule, Take 20 mg by mouth every morning before breakfast., Disp: , Rfl:   •  glucosamine-chondroitin 500-400 MG capsule capsule, Take  by mouth 3 (three) times a day with meals., Disp: , Rfl:   •  levocetirizine (XYZAL) 5 MG tablet, Take 5 mg by mouth Every Evening., Disp: , Rfl:   •  meloxicam (MOBIC) 15 MG tablet, Take 1 tablet by mouth Daily., Disp: 30 tablet, Rfl: 5  •  montelukast (SINGULAIR) 10 MG tablet, , Disp: , Rfl:   •  Multiple Vitamin (ONE DAILY ESSENTIAL PO), Take  by mouth daily., Disp: , Rfl:   •  Pseudoephedrine HCl (NASAL DECONGESTANT PO), Take  by mouth as needed., Disp: , Rfl:   •  TiZANidine (ZANAFLEX) 6 MG capsule, Take 1 capsule by mouth 2 (Two) Times a Day., Disp: 60 capsule,  Rfl: 5    Objective     Vital Signs:        Physical Exam:   General Appearance:    Alert, cooperative, in no acute distress   Head:    Normocephalic, without obvious abnormality, atraumatic   Eyes:            Lids and lashes normal, conjunctivae and sclerae normal, no   icterus, no pallor, corneas clear, PERRL   Ears:    Ears appear intact with no abnormalities noted   Throat:   No oral lesions, no thrush, oral mucosa moist   Neck:   No adenopathy, supple, trachea midline, no thyromegaly,  no JVD   Lungs:     Clear to auscultation,respirations regular, even and                  unlabored    Heart:    Regular rhythm and normal rate, normal S1 and S2, no            murmur   Abdomen:     no masses, no organomegaly, soft non-tender, non-distended, no guarding, there is no evidence of tenderness   Extremities:   Moves all extremities well, no edema, no cyanosis, no             redness   Pulses:   Pulses palpable and equal bilaterally   Skin:   No bleeding, bruising or rash   Lymph nodes:   No palpable adenopathy   Neurologic:   Cranial nerves 2 - 12 grossly intact, sensation intact      Results Review:   None    Assessment/Plan :    1. Right lower quadrant abdominal pain    2. Rectal bleed        I recommend a colonoscopy for further evaluation. The procedure was explained as well as the risks which include but are not limited to bleeding, infection, perforation, abdominal pain etc. The patient understands these risks and the procedure and wishes to proceed.     Electronically signed by Blas Thapa MD  07/28/17 3:40 PM

## 2017-07-31 PROBLEM — K62.5 RECTAL BLEED: Status: ACTIVE | Noted: 2017-07-31

## 2017-07-31 PROBLEM — R10.31 RIGHT LOWER QUADRANT ABDOMINAL PAIN: Status: ACTIVE | Noted: 2017-07-31

## 2017-08-24 ENCOUNTER — ANESTHESIA EVENT (OUTPATIENT)
Dept: GASTROENTEROLOGY | Facility: HOSPITAL | Age: 42
End: 2017-08-24

## 2017-08-24 ENCOUNTER — ANESTHESIA (OUTPATIENT)
Dept: GASTROENTEROLOGY | Facility: HOSPITAL | Age: 42
End: 2017-08-24

## 2017-08-24 ENCOUNTER — HOSPITAL ENCOUNTER (OUTPATIENT)
Facility: HOSPITAL | Age: 42
Setting detail: HOSPITAL OUTPATIENT SURGERY
Discharge: HOME | End: 2017-08-24
Attending: SURGERY | Admitting: SURGERY

## 2017-08-24 VITALS
HEART RATE: 74 BPM | TEMPERATURE: 97.4 F | WEIGHT: 240 LBS | RESPIRATION RATE: 16 BRPM | OXYGEN SATURATION: 96 % | BODY MASS INDEX: 39.99 KG/M2 | HEIGHT: 65 IN | DIASTOLIC BLOOD PRESSURE: 80 MMHG | SYSTOLIC BLOOD PRESSURE: 131 MMHG

## 2017-08-24 DIAGNOSIS — K62.5 RECTAL BLEED: ICD-10-CM

## 2017-08-24 DIAGNOSIS — R10.31 RIGHT LOWER QUADRANT ABDOMINAL PAIN: ICD-10-CM

## 2017-08-24 LAB — B-HCG UR QL: NEGATIVE

## 2017-08-24 PROCEDURE — 81025 URINE PREGNANCY TEST: CPT | Performed by: SURGERY

## 2017-08-24 PROCEDURE — 25010000002 PROPOFOL 200 MG/20ML EMULSION: Performed by: NURSE ANESTHETIST, CERTIFIED REGISTERED

## 2017-08-24 RX ORDER — SODIUM CHLORIDE, SODIUM LACTATE, POTASSIUM CHLORIDE, CALCIUM CHLORIDE 600; 310; 30; 20 MG/100ML; MG/100ML; MG/100ML; MG/100ML
1000 INJECTION, SOLUTION INTRAVENOUS CONTINUOUS PRN
Status: DISCONTINUED | OUTPATIENT
Start: 2017-08-24 | End: 2017-08-24 | Stop reason: HOSPADM

## 2017-08-24 RX ORDER — PROPOFOL 10 MG/ML
INJECTION, EMULSION INTRAVENOUS AS NEEDED
Status: DISCONTINUED | OUTPATIENT
Start: 2017-08-24 | End: 2017-08-24 | Stop reason: SURG

## 2017-08-24 RX ORDER — SODIUM CHLORIDE 0.9 % (FLUSH) 0.9 %
3 SYRINGE (ML) INJECTION AS NEEDED
Status: DISCONTINUED | OUTPATIENT
Start: 2017-08-24 | End: 2017-08-24 | Stop reason: HOSPADM

## 2017-08-24 RX ADMIN — PROPOFOL 60 MG: 10 INJECTION, EMULSION INTRAVENOUS at 12:52

## 2017-08-24 RX ADMIN — PROPOFOL 50 MG: 10 INJECTION, EMULSION INTRAVENOUS at 12:47

## 2017-08-24 RX ADMIN — LIDOCAINE HYDROCHLORIDE 40 MG: 20 INJECTION, SOLUTION INTRAVENOUS at 12:47

## 2017-08-24 RX ADMIN — SODIUM CHLORIDE, POTASSIUM CHLORIDE, SODIUM LACTATE AND CALCIUM CHLORIDE 500 ML: 600; 310; 30; 20 INJECTION, SOLUTION INTRAVENOUS at 09:48

## 2017-08-24 NOTE — H&P (VIEW-ONLY)
Patient: Madalyn Villegas    YOB: 1975    Date: 07/27/2017    Primary Care Provider: Latrice Santana MD    Reason for Consultation: Colonoscopy    Chief complaint:   Chief Complaint   Patient presents with   • Colonoscopy     rectal bleeding       Subjective .     History of present illness:  I saw the patient in the office today as a colonoscopy consultation. Patient complains of RLQ and LLQ cramping pain.  She has a lot of diarrhea and gas during her period that is reduced with gas pills.  Her PCP noticed occult blood in her stool during her recent pap smear. Menstrual cycle seems to make this worse, nothing seems to relieve this.    Review of Systems   Constitutional: Negative for chills, diaphoresis, fatigue and fever.   HENT: Negative for dental problem, drooling, ear discharge and hearing loss.    Respiratory: Negative for cough, choking, chest tightness and shortness of breath.    Cardiovascular: Negative for chest pain, palpitations and leg swelling.   Gastrointestinal: Positive for abdominal pain, blood in stool and rectal pain.   Endocrine: Negative for cold intolerance and heat intolerance.   Genitourinary: Negative for flank pain, frequency and hematuria.   Neurological: Negative for seizures, light-headedness, numbness and headaches.   Psychiatric/Behavioral: Negative for agitation, behavioral problems and confusion.       History:  Past Medical History:   Diagnosis Date   • Chronic bronchitis    • GERD (gastroesophageal reflux disease)    • Hyperlipidemia    • Kidney stones    • Migraine        Past Surgical History:   Procedure Laterality Date   • KNEE ACL RECONSTRUCTION Right 07/2010       Family History   Problem Relation Age of Onset   • Arthritis Mother    • Hyperlipidemia Mother    • Hypertension Mother    • Obesity Mother    • Heart attack Mother 64   • Hypertension Father    • Heart disease Father    • Heart attack Sister 34   • Hyperlipidemia Sister    • Diabetes Sister    •  Hypertension Sister    • Obesity Sister    • Heart disease Sister    • Heart failure Sister    • Obesity Sister    • Diabetes Sister    • Diabetes type II Sister    • Stomach cancer Maternal Uncle    • Cancer Maternal Grandfather        Social History   Substance Use Topics   • Smoking status: Current Every Day Smoker     Packs/day: 0.75     Types: Cigarettes   • Smokeless tobacco: Never Used   • Alcohol use No       Allergies:  No Known Allergies    Medications:    Current Outpatient Prescriptions:   •  acetaminophen (TYLENOL) 500 MG tablet, Take 500 mg by mouth every 6 (six) hours as needed for mild pain (1-3)., Disp: , Rfl:   •  albuterol (PROVENTIL HFA;VENTOLIN HFA) 108 (90 BASE) MCG/ACT inhaler, Inhale 2 puffs every 4 (four) hours as needed for wheezing., Disp: 18 g, Rfl: 0  •  atorvastatin (LIPITOR) 10 MG tablet, Take 1 tablet by mouth Every Night., Disp: 30 tablet, Rfl: 5  •  budesonide (RINOCORT AQUA) 32 MCG/ACT nasal spray, 1 spray into each nostril Daily., Disp: , Rfl:   •  chlorthalidone (HYGROTON) 25 MG tablet, Take 1 tablet by mouth Daily., Disp: 30 tablet, Rfl: 5  •  cholecalciferol (VITAMIN D3) 08349 UNITS capsule, Take 1 capsule by mouth Daily., Disp: 30 capsule, Rfl: 2  •  esomeprazole (NexIUM) 20 MG capsule, Take 20 mg by mouth every morning before breakfast., Disp: , Rfl:   •  glucosamine-chondroitin 500-400 MG capsule capsule, Take  by mouth 3 (three) times a day with meals., Disp: , Rfl:   •  levocetirizine (XYZAL) 5 MG tablet, Take 5 mg by mouth Every Evening., Disp: , Rfl:   •  meloxicam (MOBIC) 15 MG tablet, Take 1 tablet by mouth Daily., Disp: 30 tablet, Rfl: 5  •  montelukast (SINGULAIR) 10 MG tablet, , Disp: , Rfl:   •  Multiple Vitamin (ONE DAILY ESSENTIAL PO), Take  by mouth daily., Disp: , Rfl:   •  Pseudoephedrine HCl (NASAL DECONGESTANT PO), Take  by mouth as needed., Disp: , Rfl:   •  TiZANidine (ZANAFLEX) 6 MG capsule, Take 1 capsule by mouth 2 (Two) Times a Day., Disp: 60 capsule,  Rfl: 5    Objective     Vital Signs:        Physical Exam:   General Appearance:    Alert, cooperative, in no acute distress   Head:    Normocephalic, without obvious abnormality, atraumatic   Eyes:            Lids and lashes normal, conjunctivae and sclerae normal, no   icterus, no pallor, corneas clear, PERRL   Ears:    Ears appear intact with no abnormalities noted   Throat:   No oral lesions, no thrush, oral mucosa moist   Neck:   No adenopathy, supple, trachea midline, no thyromegaly,  no JVD   Lungs:     Clear to auscultation,respirations regular, even and                  unlabored    Heart:    Regular rhythm and normal rate, normal S1 and S2, no            murmur   Abdomen:     no masses, no organomegaly, soft non-tender, non-distended, no guarding, there is no evidence of tenderness   Extremities:   Moves all extremities well, no edema, no cyanosis, no             redness   Pulses:   Pulses palpable and equal bilaterally   Skin:   No bleeding, bruising or rash   Lymph nodes:   No palpable adenopathy   Neurologic:   Cranial nerves 2 - 12 grossly intact, sensation intact      Results Review:   None    Assessment/Plan :    1. Right lower quadrant abdominal pain    2. Rectal bleed        I recommend a colonoscopy for further evaluation. The procedure was explained as well as the risks which include but are not limited to bleeding, infection, perforation, abdominal pain etc. The patient understands these risks and the procedure and wishes to proceed.     Electronically signed by Blas Thapa MD  07/28/17 3:40 PM

## 2017-08-24 NOTE — ANESTHESIA POSTPROCEDURE EVALUATION
Patient: Madalyn Villegas    Procedure Summary     Date Anesthesia Start Anesthesia Stop Room / Location    08/24/17 1243 [id number] [facility] ENDOSCOPY 3 / [facility] ENDOSCOPY       Procedure Diagnosis Surgeon Provider    COLONOSCOPY (N/A Anus) Rectal bleed; Right lower quadrant abdominal pain  (Rectal bleed [K62.5]; Right lower quadrant abdominal pain [R10.31]) MD Lorenzo Anguiano CRNA          Anesthesia Type: MAC  Last vitals  BP   131/80 (08/24/17 1340)    Temp   97.4 °F (36.3 °C) (08/24/17 1310)    Pulse   74 (08/24/17 1340)   Resp   16 (08/24/17 1340)    SpO2   96 % (08/24/17 1340)      Post Anesthesia Care and Evaluation    Patient location during evaluation: bedside  Patient participation: complete - patient participated  Level of consciousness: awake and alert  Pain management: satisfactory to patient  Airway patency: patent  Anesthetic complications: No anesthetic complications  PONV Status: controlled  Cardiovascular status: acceptable and stable  Respiratory status: acceptable  Hydration status: acceptable

## 2017-08-24 NOTE — DISCHARGE INSTRUCTIONS
Rest today  No pushing,pulling,tugging,heavy lifting, or strenuous activity   No major decision making,driving,or drinking alcoholic beverages for 24 hours due to the sedation you received  Always use good hand hygiene/washing technique  No driving on pain medicationsTo assist you in voiding:  Drink plenty of fluids  Listen to running water while attempting to void.    If you are unable to urinate and you have an uncomfortable urge to void or it has been   6 hours since you were discharged, return to the Emergency Room

## 2017-08-24 NOTE — ANESTHESIA PREPROCEDURE EVALUATION
Anesthesia Evaluation     Patient summary reviewed and Nursing notes reviewed   no history of anesthetic complications:  NPO Solid Status: > 6 hours  NPO Liquid Status: > 8 hours     Airway   Mallampati: I  TM distance: <3 FB  Neck ROM: full  no difficulty expected  Dental - normal exam     Pulmonary - normal exam   (+) a smoker (1ppd x 25 years. Smoked today) Current, asthma (Caused by sinusitis),   Cardiovascular - normal exam    Rhythm: regular  Rate: normal    (+) hypertension well controlled, hyperlipidemia      Neuro/Psych  (+) headaches,    GI/Hepatic/Renal/Endo    (+) obesity,  GERD well controlled, hypothyroidism,     Musculoskeletal     (+) myalgias,   Abdominal  - normal exam    Abdomen: soft.  Bowel sounds: normal.   Substance History      OB/GYN negative ob/gyn ROS   (-)  Pregnant        Other                                        Anesthesia Plan    ASA 3     MAC   (Risks and benefits discussed including risk of aspiration, recall and dental damage. All patient questions answered. Will continue with POC.)  intravenous induction   Anesthetic plan and risks discussed with patient.

## 2017-08-24 NOTE — PLAN OF CARE
Problem: GI Endoscopy (Adult)  Goal: Signs and Symptoms of Listed Potential Problems Will be Absent or Manageable (GI Endoscopy)  Outcome: Ongoing (interventions implemented as appropriate)    08/24/17 0920   GI Endoscopy   Problems Assessed (GI Endoscopy) all   Problems Present (GI Endoscopy) none

## 2017-08-29 LAB
LAB AP CASE REPORT: NORMAL
Lab: NORMAL
PATH REPORT.FINAL DX SPEC: NORMAL

## 2017-09-07 ENCOUNTER — OFFICE VISIT (OUTPATIENT)
Dept: FAMILY MEDICINE CLINIC | Facility: CLINIC | Age: 42
End: 2017-09-07

## 2017-09-07 VITALS
HEART RATE: 97 BPM | BODY MASS INDEX: 39.49 KG/M2 | DIASTOLIC BLOOD PRESSURE: 70 MMHG | OXYGEN SATURATION: 97 % | TEMPERATURE: 96.9 F | SYSTOLIC BLOOD PRESSURE: 110 MMHG | WEIGHT: 237 LBS | HEIGHT: 65 IN

## 2017-09-07 DIAGNOSIS — N92.6 MENSTRUAL DISORDER: ICD-10-CM

## 2017-09-07 DIAGNOSIS — N93.9 ABNORMAL UTERINE BLEEDING: Primary | ICD-10-CM

## 2017-09-07 DIAGNOSIS — R10.2 PELVIC PAIN: ICD-10-CM

## 2017-09-07 PROCEDURE — 99214 OFFICE O/P EST MOD 30 MIN: CPT | Performed by: FAMILY MEDICINE

## 2017-09-08 RX ORDER — AZELASTINE 1 MG/ML
1 SPRAY, METERED NASAL AS NEEDED
COMMUNITY
Start: 2017-08-29 | End: 2018-07-23 | Stop reason: CLARIF

## 2017-09-09 NOTE — PROGRESS NOTES
Subjective   Madalyn Villegas is a 42 y.o. female.     History of Present Illness  Ms. Mcarthur presents today for f/u after recent colonoscopy for rectal bleeding done per Dr Thapa. She has not yet received results of pathology/biopsies of rectal area.  No polyps reported.    She continues to have diarrhea, LBP, and aching/cramping pelvic pain with menses as well as rectal pain, uterine cramps, etc for several months. Worsening over time. She c/o stress type incontinence which is worsening. Also has urinary incontinence with intercourse. Denies fever, vaginal d/c. Uses motrin which helps  symptoms minimally.    She had normal pap earlier this year with no severe rectocele/cystocele or uterine prolapse noted. She had heme + stool at that time. She is currently menstruating.    SHe has h/o HTN. Did well during recent procedure. Remains well controlled. SHe is taking BP med as rx'd.     The following portions of the patient's history were reviewed and updated as appropriate: allergies, current medications, past family history, past medical history, past social history, past surgical history and problem list.    Review of Systems   Constitutional: Positive for fatigue. Negative for fever and unexpected weight change.   HENT: Positive for congestion and postnasal drip. Negative for ear pain, mouth sores, sore throat and trouble swallowing.    Eyes: Positive for itching. Negative for discharge.   Respiratory: Positive for cough and shortness of breath (with exertion).    Cardiovascular: Positive for leg swelling. Negative for chest pain and palpitations.   Gastrointestinal: Positive for diarrhea. Negative for blood in stool, nausea and vomiting.   Genitourinary: Positive for pelvic pain and vaginal pain. Negative for dysuria, frequency, genital sores, hematuria, urgency and vaginal discharge.   Musculoskeletal: Positive for arthralgias and myalgias.   Skin: Negative for rash and wound.   Neurological: Positive for  numbness (burning in bilateral feet) and headaches. Negative for syncope and weakness.   Hematological: Negative for adenopathy. Bruises/bleeds easily.   Psychiatric/Behavioral: Negative.        Objective    Vitals:    09/07/17 0834   BP: 110/70   Pulse: 97   Temp: 96.9 °F (36.1 °C)   SpO2: 97%     Body mass index is 39.44 kg/(m^2).  Last 2 weights    09/07/17  0834   Weight: 237 lb (108 kg)       Physical Exam   Constitutional: She is oriented to person, place, and time. She appears well-developed and well-nourished. She is cooperative. She does not appear ill. No distress.   HENT:   Mouth/Throat: Mucous membranes are normal. Mucous membranes are not dry.   Cardiovascular: Normal rate, regular rhythm and intact distal pulses.    Pulmonary/Chest: Effort normal and breath sounds normal.   Abdominal: Soft. Bowel sounds are normal. She exhibits no distension and no mass. There is no hepatosplenomegaly. There is tenderness (mild) in the right lower quadrant, suprapubic area and left lower quadrant. There is no rigidity, no rebound, no guarding and no CVA tenderness.       Vascular Status -  Her exam exhibits no right foot edema. Her exam exhibits no left foot edema.  Neurological: She is alert and oriented to person, place, and time. She has normal strength. She displays no tremor. Gait normal.   Skin: Skin is warm and dry. No bruising and no rash noted.   Psychiatric: She has a normal mood and affect. Her behavior is normal. Cognition and memory are normal.   Nursing note and vitals reviewed.    Most recent labs reviewed on chart.  Colonoscopy procedure note reviewed on chart.  Pathology reviewed on chart.    Assessment/Plan   Madalyn was seen today for follow-up, pain and menstrual problem.    Diagnoses and all orders for this visit:    Abnormal uterine bleeding  -     US Non-ob Transvaginal; Future  -     Ambulatory Referral to Gynecology    Pelvic pain  -     US Non-ob Transvaginal; Future  -     Ambulatory Referral  to Gynecology    Menstrual disorder    She is encouraged to f/u with Dr. Thapa regarding pathology results.  Pelvic pain of unclear etiology. Refer to GYn for eval of possible endometriosis, fibroids, etc. TVUS to be done prior to apt.  Patient was encouraged to keep me informed of any acute changes, lack of improvement, or any new concerning symptoms.  Pt is aware of reasons to seek emergent care.  Patient voiced understanding of all instructions and denied further questions.

## 2017-09-15 ENCOUNTER — HOSPITAL ENCOUNTER (OUTPATIENT)
Dept: ULTRASOUND IMAGING | Facility: HOSPITAL | Age: 42
Discharge: HOME | End: 2017-09-15
Admitting: FAMILY MEDICINE

## 2017-09-15 DIAGNOSIS — N93.9 ABNORMAL UTERINE BLEEDING: ICD-10-CM

## 2017-09-15 DIAGNOSIS — R10.2 PELVIC PAIN: ICD-10-CM

## 2017-09-15 PROCEDURE — 76830 TRANSVAGINAL US NON-OB: CPT

## 2017-10-06 ENCOUNTER — OFFICE VISIT (OUTPATIENT)
Dept: OBSTETRICS AND GYNECOLOGY | Facility: CLINIC | Age: 42
End: 2017-10-06

## 2017-10-06 VITALS
DIASTOLIC BLOOD PRESSURE: 70 MMHG | BODY MASS INDEX: 42.52 KG/M2 | WEIGHT: 240 LBS | HEIGHT: 63 IN | SYSTOLIC BLOOD PRESSURE: 132 MMHG

## 2017-10-06 DIAGNOSIS — N92.0 MENORRHAGIA WITH REGULAR CYCLE: Primary | ICD-10-CM

## 2017-10-06 DIAGNOSIS — R10.31 RLQ ABDOMINAL PAIN: ICD-10-CM

## 2017-10-06 DIAGNOSIS — N94.6 DYSMENORRHEA: ICD-10-CM

## 2017-10-06 PROCEDURE — 99204 OFFICE O/P NEW MOD 45 MIN: CPT | Performed by: OBSTETRICS & GYNECOLOGY

## 2017-10-06 NOTE — PROGRESS NOTES
Subjective   Chief Complaint   Patient presents with   • Pelvic Pain     Pt states that she has sharp shooting pain in RLQ when ovulating and during periods. Had recent colonoscopy and TVS ( @  )     Madalyn Villegas is a 42 y.o. year old .  Patient's last menstrual period was 10/05/2017.  She presents to be seen because of menorrhagia worsening over last several years, dyparenuia wornseing over the last several months 50% of the time. Sharp shooting pains during ovulatoin and menses.   PAP WNL     OTHER COMPLAINTS:  Nothing else    The following portions of the patient's history were reviewed and updated as appropriate:  She  has a past medical history of Abnormal EKG; Asthma; Chronic bronchitis; GERD (gastroesophageal reflux disease); History of Papanicolaou smear of cervix (2017); Hyperlipidemia; Hypertension; Kidney stones; Lumbar degenerative disc disease; Migraine; and Tobacco abuse.  She  does not have any pertinent problems on file.  She  has a past surgical history that includes Anterior cruciate ligament repair (Right, 2010); Colonoscopy (N/A, 2017); and [place] tooth extraction.  Her family history includes Arthritis in her mother; Cancer in her maternal grandfather; Diabetes in her sister and sister; Diabetes type II in her sister; Heart attack (age of onset: 34) in her sister; Heart attack (age of onset: 64) in her mother; Heart disease in her father and sister; Heart failure in her sister; Hyperlipidemia in her mother and sister; Hypertension in her father, mother, and sister; Obesity in her mother, sister, and sister; Stomach cancer in her maternal uncle.  She  reports that she has been smoking Cigarettes.  She has a 18.75 pack-year smoking history. She has never used smokeless tobacco. She reports that she does not drink alcohol or use illicit drugs.  Current Outpatient Prescriptions   Medication Sig Dispense Refill   • acetaminophen (TYLENOL) 500 MG tablet Take 500 mg by  mouth every 6 (six) hours as needed for mild pain (1-3).     • albuterol (PROVENTIL HFA;VENTOLIN HFA) 108 (90 BASE) MCG/ACT inhaler Inhale 2 puffs every 4 (four) hours as needed for wheezing. 18 g 0   • atorvastatin (LIPITOR) 10 MG tablet Take 1 tablet by mouth Every Night. 30 tablet 5   • azelastine (ASTELIN) 0.1 % nasal spray 1 spray into each nostril 2 (Two) Times a Day.     • budesonide (RINOCORT AQUA) 32 MCG/ACT nasal spray 1 spray into each nostril Daily.     • chlorthalidone (HYGROTON) 25 MG tablet Take 1 tablet by mouth Daily. 30 tablet 5   • cholecalciferol (VITAMIN D3) 17677 UNITS capsule Take 1 capsule by mouth Daily. (Patient taking differently: Take 5,000 Units by mouth Daily.) 30 capsule 2   • esomeprazole (NexIUM) 20 MG capsule Take 20 mg by mouth every morning before breakfast.     • glucosamine-chondroitin 500-400 MG capsule capsule Take  by mouth 3 (three) times a day with meals.     • levocetirizine (XYZAL) 5 MG tablet Take 5 mg by mouth Every Evening.     • meloxicam (MOBIC) 15 MG tablet Take 1 tablet by mouth Daily. 30 tablet 5   • montelukast (SINGULAIR) 10 MG tablet      • Multiple Vitamin (ONE DAILY ESSENTIAL PO) Take  by mouth daily.     • TiZANidine (ZANAFLEX) 6 MG capsule Take 1 capsule by mouth 2 (Two) Times a Day. 60 capsule 5     No current facility-administered medications for this visit.      Current Outpatient Prescriptions on File Prior to Visit   Medication Sig   • acetaminophen (TYLENOL) 500 MG tablet Take 500 mg by mouth every 6 (six) hours as needed for mild pain (1-3).   • albuterol (PROVENTIL HFA;VENTOLIN HFA) 108 (90 BASE) MCG/ACT inhaler Inhale 2 puffs every 4 (four) hours as needed for wheezing.   • atorvastatin (LIPITOR) 10 MG tablet Take 1 tablet by mouth Every Night.   • azelastine (ASTELIN) 0.1 % nasal spray 1 spray into each nostril 2 (Two) Times a Day.   • budesonide (RINOCORT AQUA) 32 MCG/ACT nasal spray 1 spray into each nostril Daily.   • chlorthalidone (HYGROTON)  "25 MG tablet Take 1 tablet by mouth Daily.   • cholecalciferol (VITAMIN D3) 64006 UNITS capsule Take 1 capsule by mouth Daily. (Patient taking differently: Take 5,000 Units by mouth Daily.)   • esomeprazole (NexIUM) 20 MG capsule Take 20 mg by mouth every morning before breakfast.   • glucosamine-chondroitin 500-400 MG capsule capsule Take  by mouth 3 (three) times a day with meals.   • levocetirizine (XYZAL) 5 MG tablet Take 5 mg by mouth Every Evening.   • meloxicam (MOBIC) 15 MG tablet Take 1 tablet by mouth Daily.   • montelukast (SINGULAIR) 10 MG tablet    • Multiple Vitamin (ONE DAILY ESSENTIAL PO) Take  by mouth daily.   • TiZANidine (ZANAFLEX) 6 MG capsule Take 1 capsule by mouth 2 (Two) Times a Day.     No current facility-administered medications on file prior to visit.      She has No Known Allergies.    Smoking status: Current Every Day Smoker                                                   Packs/day: 0.75      Years: 25.00        Types: Cigarettes  Smokeless status: Never Used                        Review of Systems  Consitutional POS: nothing reported    NEG: anorexia or night sweats   Gastointestinal POS: nothing reported    NEG: bloating, change in bowel habits, melena or reflux symptoms   Genitourinary POS: nothing reported    NEG: dysuria or hematuria   Integument POS: nothing reported    NEG: moles that are changing in size, shape, color or rashes   Breast POS: nothing reported    NEG: persistent breast lump, skin dimpling or nipple discharge         Eyes: negative  Ears, nose, mouth, throat, and face: negative  Respiratory: negative  Cardiovascular: negative  Integument/breast: negative  GYN:  negative  Hematologic/lymphatic: negative  Musculoskeletal:negative  Neurological: negative  Behavioral/Psych: negative          Objective   /70  Ht 63\" (160 cm)  Wt 240 lb (109 kg)  LMP 10/05/2017  BMI 42.51 kg/m2    General:  well developed; well nourished  no acute distress   Skin:  No " suspicious lesions seen   Thyroid: normal to inspection and palpation   Lungs:  breathing is unlabored  clear to auscultation bilaterally   Heart:  regular rate and rhythm, S1, S2 normal, no murmur, click, rub or gallop   Breasts:  Not performed.   Abdomen: soft, non-tender; no masses  no umbilical or inginual hernias are present  no hepato-splenomegaly   Pelvis: Not performed.     Psychiatric: Alert and oriented ×3, mood and affect appropriate  HEENT: Atraumatic, normocephalic, normal scleral icterus  Extremities: 2+ pulses bilaterally, no edema      Lab Review   No data reviewed    Imaging   Pelvic ultrasound report     FINDINGS:  The uterus is retroverted and retroflexed.  It measures 6.1 x  3.2 x 4.0 cm.  The endometrial stripe measures 9 mm.  This is within  normal limits for age. Within the fundal portion of the endometrial  cavity, there is a lobular, hyperechoic focus measuring approximately  1.2 cm. Endometrial polyp is a consideration  The myometrium is  homogeneous. Nabothian cysts are noted in the cervix which is otherwise  unremarkable.      The left ovary measures 1.9 x 2.8 x 2.1 cm. It is normal in appearance.  The right ovary measures 4.3 x 2.9 x 2.9 cm. There is a hypoechoic  lesion within the right ovary with internal echoes. Favor hemorrhagic  cyst. This measures 2.8 cm. Color imaging to the ovaries is normal  bilaterally. Physiologic free fluid is noted.      IMPRESSION:      1. 2.8 cm complex right ovarian cyst, likely a hemorrhagic cyst. Can  follow in 6 weeks if indicated.  2. Lobular hyperechoic focus in the endometrial cavity. Endometrial  polyp not excluded.     Assessment   1. Menorrhagia  2. Dyspareunia  3. Endomtrial Polyp  4. RLQ sharp stabbing pains for last couple of months  5. Diarrhea with periods     Plan   1. Hysteroscopy, D&C, Novasure Ablation  2.     No orders of the defined types were placed in this encounter.         This note was electronically signed.      October 6,  2017

## 2017-10-11 ENCOUNTER — RESULTS ENCOUNTER (OUTPATIENT)
Dept: OBSTETRICS AND GYNECOLOGY | Facility: CLINIC | Age: 42
End: 2017-10-11

## 2017-10-11 DIAGNOSIS — N92.0 MENORRHAGIA WITH REGULAR CYCLE: ICD-10-CM

## 2017-10-11 DIAGNOSIS — N94.6 DYSMENORRHEA: ICD-10-CM

## 2017-10-26 ENCOUNTER — OFFICE VISIT (OUTPATIENT)
Dept: RETAIL CLINIC | Facility: CLINIC | Age: 42
End: 2017-10-26

## 2017-10-26 VITALS
WEIGHT: 240 LBS | OXYGEN SATURATION: 98 % | HEART RATE: 88 BPM | BODY MASS INDEX: 39.99 KG/M2 | TEMPERATURE: 99.4 F | HEIGHT: 65 IN | RESPIRATION RATE: 18 BRPM

## 2017-10-26 DIAGNOSIS — J01.10 ACUTE NON-RECURRENT FRONTAL SINUSITIS: Primary | ICD-10-CM

## 2017-10-26 PROCEDURE — 99213 OFFICE O/P EST LOW 20 MIN: CPT | Performed by: NURSE PRACTITIONER

## 2017-10-26 RX ORDER — AMOXICILLIN AND CLAVULANATE POTASSIUM 875; 125 MG/1; MG/1
1 TABLET, FILM COATED ORAL 2 TIMES DAILY
Qty: 20 TABLET | Refills: 0 | Status: SHIPPED | OUTPATIENT
Start: 2017-10-26 | End: 2017-11-05

## 2017-10-26 RX ORDER — PREDNISONE 20 MG/1
20 TABLET ORAL 3 TIMES DAILY
Qty: 9 TABLET | Refills: 0 | Status: SHIPPED | OUTPATIENT
Start: 2017-10-26 | End: 2017-10-29

## 2017-10-26 NOTE — PROGRESS NOTES
Subjective   Madalyn Villegas is a 42 y.o. female.     Cough   This is a new problem. Episode onset: one month. The problem has been gradually worsening. The problem occurs constantly. The cough is productive of sputum. Associated symptoms include chills, ear congestion, nasal congestion, rhinorrhea and a sore throat. Pertinent negatives include no chest pain, ear pain, fever, headaches, hemoptysis, rash, shortness of breath, sweats or wheezing. The symptoms are aggravated by lying down. Treatments tried: mucinex. The treatment provided moderate relief. Her past medical history is significant for bronchitis.        Current Outpatient Prescriptions on File Prior to Visit   Medication Sig Dispense Refill   • acetaminophen (TYLENOL) 500 MG tablet Take 500 mg by mouth every 6 (six) hours as needed for mild pain (1-3).     • albuterol (PROVENTIL HFA;VENTOLIN HFA) 108 (90 BASE) MCG/ACT inhaler Inhale 2 puffs every 4 (four) hours as needed for wheezing. 18 g 0   • atorvastatin (LIPITOR) 10 MG tablet Take 1 tablet by mouth Every Night. 30 tablet 5   • azelastine (ASTELIN) 0.1 % nasal spray 1 spray into each nostril 2 (Two) Times a Day.     • budesonide (RINOCORT AQUA) 32 MCG/ACT nasal spray 1 spray into each nostril Daily.     • chlorthalidone (HYGROTON) 25 MG tablet Take 1 tablet by mouth Daily. 30 tablet 5   • cholecalciferol (VITAMIN D3) 00732 UNITS capsule Take 1 capsule by mouth Daily. (Patient taking differently: Take 5,000 Units by mouth Daily.) 30 capsule 2   • esomeprazole (NexIUM) 20 MG capsule Take 20 mg by mouth every morning before breakfast.     • glucosamine-chondroitin 500-400 MG capsule capsule Take  by mouth 3 (three) times a day with meals.     • levocetirizine (XYZAL) 5 MG tablet Take 5 mg by mouth Every Evening.     • meloxicam (MOBIC) 15 MG tablet Take 1 tablet by mouth Daily. 30 tablet 5   • montelukast (SINGULAIR) 10 MG tablet      • Multiple Vitamin (ONE DAILY ESSENTIAL PO) Take  by mouth daily.      • TiZANidine (ZANAFLEX) 6 MG capsule Take 1 capsule by mouth 2 (Two) Times a Day. 60 capsule 5     No current facility-administered medications on file prior to visit.        No Known Allergies    Past Medical History:   Diagnosis Date   • Abnormal EKG    • Asthma     ASTHMA LIKE SYMPTOMS WITH SINUS INFECTION   • Chronic bronchitis    • GERD (gastroesophageal reflux disease)    • History of Papanicolaou smear of cervix 09/2017    WNL       • Hyperlipidemia    • Hypertension    • Kidney stones    • Lumbar degenerative disc disease    • Migraine    • Tobacco abuse        Past Surgical History:   Procedure Laterality Date   • COLONOSCOPY N/A 8/24/2017    Procedure: COLONOSCOPY;  Surgeon: Blas Thapa MD;  Location: [facility] ENDOSCOPY;  Service:    • KNEE ACL RECONSTRUCTION Right 07/2010   • WISDOM TOOTH EXTRACTION         Family History   Problem Relation Age of Onset   • Arthritis Mother    • Hyperlipidemia Mother    • Hypertension Mother    • Obesity Mother    • Heart attack Mother 64   • Hypertension Father    • Heart disease Father    • Heart attack Sister 34   • Hyperlipidemia Sister    • Diabetes Sister    • Hypertension Sister    • Obesity Sister    • Heart disease Sister    • Heart failure Sister    • Obesity Sister    • Diabetes Sister    • Diabetes type II Sister    • Stomach cancer Maternal Uncle    • Cancer Maternal Grandfather        Social History     Social History   • Marital status: Single     Spouse name: N/A   • Number of children: N/A   • Years of education: N/A     Occupational History   • Not on file.     Social History Main Topics   • Smoking status: Current Every Day Smoker     Packs/day: 0.75     Years: 25.00     Types: Cigarettes   • Smokeless tobacco: Never Used   • Alcohol use No   • Drug use: No   • Sexual activity: Yes     Partners: Male     Birth control/ protection: None     Other Topics Concern   • Not on file     Social History Narrative       Review of Systems  "  Constitutional: Positive for activity change and chills. Negative for appetite change, diaphoresis and fever.   HENT: Positive for congestion, rhinorrhea, sinus pressure and sore throat. Negative for ear pain.    Respiratory: Positive for cough. Negative for hemoptysis, shortness of breath and wheezing.    Cardiovascular: Negative for chest pain.   Gastrointestinal: Negative for abdominal pain, diarrhea, nausea and vomiting.   Skin: Negative for rash.   Neurological: Negative for headaches.       Pulse 88  Temp 99.4 °F (37.4 °C)  Resp 18  Ht 65\" (165.1 cm)  Wt 240 lb (109 kg)  LMP 10/05/2017  SpO2 98%  BMI 39.94 kg/m2    Objective   Physical Exam   Constitutional: She is oriented to person, place, and time. She appears well-developed and well-nourished. She is cooperative.   HENT:   Head: Normocephalic.   Right Ear: External ear and ear canal normal. A middle ear effusion is present.   Left Ear: External ear and ear canal normal. A middle ear effusion is present.   Nose: Rhinorrhea present. Right sinus exhibits frontal sinus tenderness. Right sinus exhibits no maxillary sinus tenderness. Left sinus exhibits frontal sinus tenderness. Left sinus exhibits no maxillary sinus tenderness.   Mouth/Throat: Uvula is midline and mucous membranes are normal. Posterior oropharyngeal erythema present.   Eyes: Conjunctivae, EOM and lids are normal.   Cardiovascular: Normal rate, regular rhythm and normal heart sounds.    Pulmonary/Chest: Effort normal and breath sounds normal. No accessory muscle usage. No respiratory distress.   Lymphadenopathy:     She has no cervical adenopathy.   Neurological: She is alert and oriented to person, place, and time.   Skin: Skin is warm, dry and intact.   Psychiatric: She has a normal mood and affect. Her speech is normal and behavior is normal.         Assessment/Plan   Madalyn was seen today for cough.    Diagnoses and all orders for this visit:    Acute non-recurrent frontal " sinusitis  -     amoxicillin-clavulanate (AUGMENTIN) 875-125 MG per tablet; Take 1 tablet by mouth 2 (Two) Times a Day for 10 days.  -     predniSONE (DELTASONE) 20 MG tablet; Take 1 tablet by mouth 3 (Three) Times a Day for 3 days.      Follow up with PCP or go to the nearest emergency room if symptoms worsen or fail to improve.

## 2017-11-09 ENCOUNTER — APPOINTMENT (OUTPATIENT)
Dept: PREADMISSION TESTING | Facility: HOSPITAL | Age: 42
End: 2017-11-09

## 2017-11-09 ENCOUNTER — HOSPITAL ENCOUNTER (OUTPATIENT)
Dept: GENERAL RADIOLOGY | Facility: HOSPITAL | Age: 42
Discharge: HOME | End: 2017-11-09
Admitting: OBSTETRICS & GYNECOLOGY

## 2017-11-09 VITALS
HEIGHT: 65 IN | BODY MASS INDEX: 39.99 KG/M2 | WEIGHT: 240 LBS | DIASTOLIC BLOOD PRESSURE: 73 MMHG | HEART RATE: 96 BPM | SYSTOLIC BLOOD PRESSURE: 143 MMHG

## 2017-11-09 DIAGNOSIS — N94.6 DYSMENORRHEA: ICD-10-CM

## 2017-11-09 DIAGNOSIS — N92.0 MENORRHAGIA WITH REGULAR CYCLE: ICD-10-CM

## 2017-11-09 LAB
ANION GAP SERPL CALCULATED.3IONS-SCNC: 17.2 MMOL/L
BACTERIA UR QL AUTO: ABNORMAL /HPF
BASOPHILS # BLD AUTO: 0.05 10*3/MM3 (ref 0–0.2)
BASOPHILS NFR BLD AUTO: 0.4 % (ref 0–2.5)
BILIRUB UR QL STRIP: NEGATIVE
BUN BLD-MCNC: 22 MG/DL (ref 7–20)
BUN/CREAT SERPL: 31.4 (ref 7.1–23.5)
CALCIUM SPEC-SCNC: 10.2 MG/DL (ref 8.4–10.2)
CHLORIDE SERPL-SCNC: 103 MMOL/L (ref 98–107)
CLARITY UR: CLEAR
CO2 SERPL-SCNC: 26 MMOL/L (ref 26–30)
COLOR UR: YELLOW
CREAT BLD-MCNC: 0.7 MG/DL (ref 0.6–1.3)
DEPRECATED RDW RBC AUTO: 38.7 FL (ref 37–54)
EOSINOPHIL # BLD AUTO: 0.21 10*3/MM3 (ref 0–0.7)
EOSINOPHIL NFR BLD AUTO: 1.8 % (ref 0–7)
ERYTHROCYTE [DISTWIDTH] IN BLOOD BY AUTOMATED COUNT: 12.8 % (ref 11.5–14.5)
GFR SERPL CREATININE-BSD FRML MDRD: 91 ML/MIN/1.73
GLUCOSE BLD-MCNC: 96 MG/DL (ref 74–98)
GLUCOSE UR STRIP-MCNC: NEGATIVE MG/DL
HCT VFR BLD AUTO: 40.1 % (ref 37–47)
HGB BLD-MCNC: 13.6 G/DL (ref 12–16)
HGB UR QL STRIP.AUTO: ABNORMAL
HYALINE CASTS UR QL AUTO: ABNORMAL /LPF
IMM GRANULOCYTES # BLD: 0.06 10*3/MM3 (ref 0–0.06)
IMM GRANULOCYTES NFR BLD: 0.5 % (ref 0–0.6)
KETONES UR QL STRIP: NEGATIVE
LEUKOCYTE ESTERASE UR QL STRIP.AUTO: NEGATIVE
LYMPHOCYTES # BLD AUTO: 2.61 10*3/MM3 (ref 0.6–3.4)
LYMPHOCYTES NFR BLD AUTO: 21.8 % (ref 10–50)
MCH RBC QN AUTO: 28.6 PG (ref 27–31)
MCHC RBC AUTO-ENTMCNC: 33.9 G/DL (ref 30–37)
MCV RBC AUTO: 84.4 FL (ref 81–99)
MONOCYTES # BLD AUTO: 0.7 10*3/MM3 (ref 0–0.9)
MONOCYTES NFR BLD AUTO: 5.8 % (ref 0–12)
NEUTROPHILS # BLD AUTO: 8.36 10*3/MM3 (ref 2–6.9)
NEUTROPHILS NFR BLD AUTO: 69.7 % (ref 37–80)
NITRITE UR QL STRIP: NEGATIVE
NRBC BLD MANUAL-RTO: 0 /100 WBC (ref 0–0)
PH UR STRIP.AUTO: 5.5 [PH] (ref 5–8)
PLATELET # BLD AUTO: 231 10*3/MM3 (ref 130–400)
PMV BLD AUTO: 11.7 FL (ref 6–12)
POTASSIUM BLD-SCNC: 3.2 MMOL/L (ref 3.5–5.1)
PROT UR QL STRIP: NEGATIVE
RBC # BLD AUTO: 4.75 10*6/MM3 (ref 4.2–5.4)
RBC # UR: ABNORMAL /HPF
REF LAB TEST METHOD: ABNORMAL
SODIUM BLD-SCNC: 143 MMOL/L (ref 137–145)
SP GR UR STRIP: 1.02 (ref 1–1.03)
SQUAMOUS #/AREA URNS HPF: ABNORMAL /HPF
UROBILINOGEN UR QL STRIP: ABNORMAL
WBC NRBC COR # BLD: 11.99 10*3/MM3 (ref 4.8–10.8)
WBC UR QL AUTO: ABNORMAL /HPF

## 2017-11-09 PROCEDURE — 93005 ELECTROCARDIOGRAM TRACING: CPT | Performed by: OBSTETRICS & GYNECOLOGY

## 2017-11-09 PROCEDURE — 80048 BASIC METABOLIC PNL TOTAL CA: CPT | Performed by: OBSTETRICS & GYNECOLOGY

## 2017-11-09 PROCEDURE — 71010 HC CHEST PA OR AP: CPT

## 2017-11-09 PROCEDURE — 81001 URINALYSIS AUTO W/SCOPE: CPT | Performed by: OBSTETRICS & GYNECOLOGY

## 2017-11-09 PROCEDURE — 36415 COLL VENOUS BLD VENIPUNCTURE: CPT

## 2017-11-09 PROCEDURE — 85025 COMPLETE CBC W/AUTO DIFF WBC: CPT | Performed by: OBSTETRICS & GYNECOLOGY

## 2017-11-09 RX ORDER — PSEUDOEPHEDRINE HCL 30 MG
30 TABLET ORAL EVERY 4 HOURS PRN
COMMUNITY
End: 2020-08-19

## 2017-11-09 RX ORDER — GUAIFENESIN 600 MG/1
1200 TABLET, EXTENDED RELEASE ORAL 2 TIMES DAILY
COMMUNITY
End: 2017-11-27

## 2017-11-10 NOTE — PAT
PATIENT WITH RECENT SINUS INFECTION AND COUGH . PATIENT HAS BEEN ON ANITBIOTICS HAS INHALERS CXR ORDERED PER ANESTHESIA GUIDELINES . ARLIN ALSTON CRNA NOTIFIED.

## 2017-11-16 ENCOUNTER — ANESTHESIA EVENT (OUTPATIENT)
Dept: PERIOP | Facility: HOSPITAL | Age: 42
End: 2017-11-16

## 2017-11-16 NOTE — H&P
Patient presents with   • Pelvic Pain       Pt states that she has sharp shooting pain in RLQ when ovulating and during periods. Had recent colonoscopy and TVS ( @  )      Madalyn Villegas is a 42 y.o. year old .  Patient's last menstrual period was 10/05/2017.  She presents to be seen because of menorrhagia worsening over last several years, dyparenuia wornseing over the last several months 50% of the time. Sharp shooting pains during ovulatoin and menses.   PAP WNL      OTHER COMPLAINTS:  Nothing else     The following portions of the patient's history were reviewed and updated as appropriate:  She  has a past medical history of Abnormal EKG; Asthma; Chronic bronchitis; GERD (gastroesophageal reflux disease); History of Papanicolaou smear of cervix (2017); Hyperlipidemia; Hypertension; Kidney stones; Lumbar degenerative disc disease; Migraine; and Tobacco abuse.  She  does not have any pertinent problems on file.  She  has a past surgical history that includes Anterior cruciate ligament repair (Right, 2010); Colonoscopy (N/A, 2017); and [place] tooth extraction.  Her family history includes Arthritis in her mother; Cancer in her maternal grandfather; Diabetes in her sister and sister; Diabetes type II in her sister; Heart attack (age of onset: 34) in her sister; Heart attack (age of onset: 64) in her mother; Heart disease in her father and sister; Heart failure in her sister; Hyperlipidemia in her mother and sister; Hypertension in her father, mother, and sister; Obesity in her mother, sister, and sister; Stomach cancer in her maternal uncle.  She  reports that she has been smoking Cigarettes.  She has a 18.75 pack-year smoking history. She has never used smokeless tobacco. She reports that she does not drink alcohol or use illicit drugs.         Current Outpatient Prescriptions   Medication Sig Dispense Refill   • acetaminophen (TYLENOL) 500 MG tablet Take 500 mg by mouth every 6 (six)  hours as needed for mild pain (1-3).       • albuterol (PROVENTIL HFA;VENTOLIN HFA) 108 (90 BASE) MCG/ACT inhaler Inhale 2 puffs every 4 (four) hours as needed for wheezing. 18 g 0   • atorvastatin (LIPITOR) 10 MG tablet Take 1 tablet by mouth Every Night. 30 tablet 5   • azelastine (ASTELIN) 0.1 % nasal spray 1 spray into each nostril 2 (Two) Times a Day.       • budesonide (RINOCORT AQUA) 32 MCG/ACT nasal spray 1 spray into each nostril Daily.       • chlorthalidone (HYGROTON) 25 MG tablet Take 1 tablet by mouth Daily. 30 tablet 5   • cholecalciferol (VITAMIN D3) 16752 UNITS capsule Take 1 capsule by mouth Daily. (Patient taking differently: Take 5,000 Units by mouth Daily.) 30 capsule 2   • esomeprazole (NexIUM) 20 MG capsule Take 20 mg by mouth every morning before breakfast.       • glucosamine-chondroitin 500-400 MG capsule capsule Take  by mouth 3 (three) times a day with meals.       • levocetirizine (XYZAL) 5 MG tablet Take 5 mg by mouth Every Evening.       • meloxicam (MOBIC) 15 MG tablet Take 1 tablet by mouth Daily. 30 tablet 5   • montelukast (SINGULAIR) 10 MG tablet         • Multiple Vitamin (ONE DAILY ESSENTIAL PO) Take  by mouth daily.       • TiZANidine (ZANAFLEX) 6 MG capsule Take 1 capsule by mouth 2 (Two) Times a Day. 60 capsule 5      No current facility-administered medications for this visit.            Current Outpatient Prescriptions on File Prior to Visit   Medication Sig   • acetaminophen (TYLENOL) 500 MG tablet Take 500 mg by mouth every 6 (six) hours as needed for mild pain (1-3).   • albuterol (PROVENTIL HFA;VENTOLIN HFA) 108 (90 BASE) MCG/ACT inhaler Inhale 2 puffs every 4 (four) hours as needed for wheezing.   • atorvastatin (LIPITOR) 10 MG tablet Take 1 tablet by mouth Every Night.   • azelastine (ASTELIN) 0.1 % nasal spray 1 spray into each nostril 2 (Two) Times a Day.   • budesonide (RINOCORT AQUA) 32 MCG/ACT nasal spray 1 spray into each nostril Daily.   • chlorthalidone  "(HYGROTON) 25 MG tablet Take 1 tablet by mouth Daily.   • cholecalciferol (VITAMIN D3) 45641 UNITS capsule Take 1 capsule by mouth Daily. (Patient taking differently: Take 5,000 Units by mouth Daily.)   • esomeprazole (NexIUM) 20 MG capsule Take 20 mg by mouth every morning before breakfast.   • glucosamine-chondroitin 500-400 MG capsule capsule Take  by mouth 3 (three) times a day with meals.   • levocetirizine (XYZAL) 5 MG tablet Take 5 mg by mouth Every Evening.   • meloxicam (MOBIC) 15 MG tablet Take 1 tablet by mouth Daily.   • montelukast (SINGULAIR) 10 MG tablet     • Multiple Vitamin (ONE DAILY ESSENTIAL PO) Take  by mouth daily.   • TiZANidine (ZANAFLEX) 6 MG capsule Take 1 capsule by mouth 2 (Two) Times a Day.      No current facility-administered medications on file prior to visit.       She has No Known Allergies.     Smoking status: Current Every Day Smoker                                                   Packs/day: 0.75      Years: 25.00        Types: Cigarettes  Smokeless status: Never Used                         Review of Systems        Consitutional POS: nothing reported     NEG: anorexia or night sweats   Gastointestinal POS: nothing reported     NEG: bloating, change in bowel habits, melena or reflux symptoms   Genitourinary POS: nothing reported     NEG: dysuria or hematuria   Integument POS: nothing reported     NEG: moles that are changing in size, shape, color or rashes   Breast POS: nothing reported     NEG: persistent breast lump, skin dimpling or nipple discharge          Eyes: negative  Ears, nose, mouth, throat, and face: negative  Respiratory: negative  Cardiovascular: negative  Integument/breast: negative  GYN:  negative  Hematologic/lymphatic: negative  Musculoskeletal:negative  Neurological: negative  Behavioral/Psych: negative               Objective       /70  Ht 63\" (160 cm)  Wt 240 lb (109 kg)  LMP 10/05/2017  BMI 42.51 kg/m2     General:  well developed; well " nourished  no acute distress   Skin:  No suspicious lesions seen   Thyroid: normal to inspection and palpation   Lungs:  breathing is unlabored  clear to auscultation bilaterally   Heart:  regular rate and rhythm, S1, S2 normal, no murmur, click, rub or gallop   Breasts:  Not performed.   Abdomen: soft, non-tender; no masses  no umbilical or inginual hernias are present  no hepato-splenomegaly   Pelvis: Not performed.      Psychiatric: Alert and oriented ×3, mood and affect appropriate  HEENT: Atraumatic, normocephalic, normal scleral icterus  Extremities: 2+ pulses bilaterally, no edema        Lab Review   No data reviewed     Imaging   Pelvic ultrasound report      FINDINGS:  The uterus is retroverted and retroflexed.  It measures 6.1 x  3.2 x 4.0 cm.  The endometrial stripe measures 9 mm.  This is within  normal limits for age. Within the fundal portion of the endometrial  cavity, there is a lobular, hyperechoic focus measuring approximately  1.2 cm. Endometrial polyp is a consideration  The myometrium is  homogeneous. Nabothian cysts are noted in the cervix which is otherwise  unremarkable.      The left ovary measures 1.9 x 2.8 x 2.1 cm. It is normal in appearance.  The right ovary measures 4.3 x 2.9 x 2.9 cm. There is a hypoechoic  lesion within the right ovary with internal echoes. Favor hemorrhagic  cyst. This measures 2.8 cm. Color imaging to the ovaries is normal  bilaterally. Physiologic free fluid is noted.      IMPRESSION:      1. 2.8 cm complex right ovarian cyst, likely a hemorrhagic cyst. Can  follow in 6 weeks if indicated.  2. Lobular hyperechoic focus in the endometrial cavity. Endometrial  polyp not excluded.        Assessment       1. Menorrhagia  2. Dyspareunia  3. Endomtrial Polyp  4. RLQ sharp stabbing pains for last couple of months  5. Diarrhea with periods        Plan       1. Hysteroscopy, D&C, Novasure Ablation

## 2017-11-17 ENCOUNTER — ANESTHESIA (OUTPATIENT)
Dept: PERIOP | Facility: HOSPITAL | Age: 42
End: 2017-11-17

## 2017-11-17 ENCOUNTER — HOSPITAL ENCOUNTER (OUTPATIENT)
Facility: HOSPITAL | Age: 42
Setting detail: HOSPITAL OUTPATIENT SURGERY
Discharge: HOME | End: 2017-11-17
Attending: OBSTETRICS & GYNECOLOGY | Admitting: OBSTETRICS & GYNECOLOGY

## 2017-11-17 VITALS
RESPIRATION RATE: 16 BRPM | SYSTOLIC BLOOD PRESSURE: 149 MMHG | TEMPERATURE: 98.2 F | DIASTOLIC BLOOD PRESSURE: 89 MMHG | HEART RATE: 77 BPM | OXYGEN SATURATION: 97 %

## 2017-11-17 DIAGNOSIS — N94.6 DYSMENORRHEA: ICD-10-CM

## 2017-11-17 DIAGNOSIS — N92.0 MENORRHAGIA WITH REGULAR CYCLE: ICD-10-CM

## 2017-11-17 LAB — B-HCG UR QL: NEGATIVE

## 2017-11-17 PROCEDURE — 58563 HYSTEROSCOPY ABLATION: CPT | Performed by: OBSTETRICS & GYNECOLOGY

## 2017-11-17 PROCEDURE — 58120 DILATION AND CURETTAGE: CPT | Performed by: OBSTETRICS & GYNECOLOGY

## 2017-11-17 PROCEDURE — 81025 URINE PREGNANCY TEST: CPT | Performed by: OBSTETRICS & GYNECOLOGY

## 2017-11-17 PROCEDURE — 25010000002 FENTANYL CITRATE (PF) 100 MCG/2ML SOLUTION: Performed by: NURSE ANESTHETIST, CERTIFIED REGISTERED

## 2017-11-17 PROCEDURE — 25010000002 DEXAMETHASONE PER 1 MG: Performed by: NURSE ANESTHETIST, CERTIFIED REGISTERED

## 2017-11-17 PROCEDURE — 25010000002 PROPOFOL 1000 MG/ML EMULSION: Performed by: NURSE ANESTHETIST, CERTIFIED REGISTERED

## 2017-11-17 PROCEDURE — 25010000002 MIDAZOLAM PER 1 MG: Performed by: NURSE ANESTHETIST, CERTIFIED REGISTERED

## 2017-11-17 PROCEDURE — 25010000002 KETOROLAC TROMETHAMINE PER 15 MG: Performed by: NURSE ANESTHETIST, CERTIFIED REGISTERED

## 2017-11-17 PROCEDURE — 25010000002 ONDANSETRON PER 1 MG: Performed by: NURSE ANESTHETIST, CERTIFIED REGISTERED

## 2017-11-17 RX ORDER — ONDANSETRON 4 MG/1
4 TABLET, FILM COATED ORAL ONCE AS NEEDED
Status: DISCONTINUED | OUTPATIENT
Start: 2017-11-17 | End: 2017-11-17 | Stop reason: HOSPADM

## 2017-11-17 RX ORDER — PROMETHAZINE HYDROCHLORIDE 25 MG/ML
6.25 INJECTION, SOLUTION INTRAMUSCULAR; INTRAVENOUS EVERY 6 HOURS PRN
Status: DISCONTINUED | OUTPATIENT
Start: 2017-11-17 | End: 2017-11-17 | Stop reason: HOSPADM

## 2017-11-17 RX ORDER — DOXYCYCLINE HYCLATE 100 MG/1
100 CAPSULE ORAL 2 TIMES DAILY
Qty: 14 CAPSULE | Refills: 0 | Status: SHIPPED | OUTPATIENT
Start: 2017-11-17 | End: 2017-11-24

## 2017-11-17 RX ORDER — MEPERIDINE HYDROCHLORIDE 50 MG/ML
INJECTION INTRAMUSCULAR; INTRAVENOUS; SUBCUTANEOUS AS NEEDED
Status: DISCONTINUED | OUTPATIENT
Start: 2017-11-17 | End: 2017-11-17 | Stop reason: SURG

## 2017-11-17 RX ORDER — FENTANYL CITRATE 50 UG/ML
INJECTION, SOLUTION INTRAMUSCULAR; INTRAVENOUS AS NEEDED
Status: DISCONTINUED | OUTPATIENT
Start: 2017-11-17 | End: 2017-11-17 | Stop reason: SURG

## 2017-11-17 RX ORDER — SODIUM CHLORIDE, SODIUM LACTATE, POTASSIUM CHLORIDE, CALCIUM CHLORIDE 600; 310; 30; 20 MG/100ML; MG/100ML; MG/100ML; MG/100ML
1000 INJECTION, SOLUTION INTRAVENOUS CONTINUOUS PRN
Status: DISCONTINUED | OUTPATIENT
Start: 2017-11-17 | End: 2017-11-17 | Stop reason: HOSPADM

## 2017-11-17 RX ORDER — SODIUM CHLORIDE 0.9 % (FLUSH) 0.9 %
3 SYRINGE (ML) INJECTION AS NEEDED
Status: DISCONTINUED | OUTPATIENT
Start: 2017-11-17 | End: 2017-11-17 | Stop reason: HOSPADM

## 2017-11-17 RX ORDER — MEPERIDINE HYDROCHLORIDE 50 MG/ML
50 INJECTION INTRAMUSCULAR; INTRAVENOUS; SUBCUTANEOUS ONCE
Status: DISCONTINUED | OUTPATIENT
Start: 2017-11-17 | End: 2017-11-17 | Stop reason: HOSPADM

## 2017-11-17 RX ORDER — LIDOCAINE HYDROCHLORIDE 10 MG/ML
INJECTION, SOLUTION INFILTRATION; PERINEURAL
Status: DISCONTINUED
Start: 2017-11-17 | End: 2017-11-17 | Stop reason: HOSPADM

## 2017-11-17 RX ORDER — LIDOCAINE HYDROCHLORIDE 10 MG/ML
INJECTION, SOLUTION INFILTRATION; PERINEURAL AS NEEDED
Status: DISCONTINUED | OUTPATIENT
Start: 2017-11-17 | End: 2017-11-17 | Stop reason: HOSPADM

## 2017-11-17 RX ORDER — ONDANSETRON 2 MG/ML
4 INJECTION INTRAMUSCULAR; INTRAVENOUS ONCE
Status: DISCONTINUED | OUTPATIENT
Start: 2017-11-17 | End: 2017-11-17 | Stop reason: HOSPADM

## 2017-11-17 RX ORDER — IBUPROFEN 600 MG/1
600 TABLET ORAL EVERY 6 HOURS PRN
Qty: 30 TABLET | Refills: 1 | Status: SHIPPED | OUTPATIENT
Start: 2017-11-17 | End: 2017-11-27

## 2017-11-17 RX ORDER — HYDROCODONE BITARTRATE AND ACETAMINOPHEN 5; 325 MG/1; MG/1
1 TABLET ORAL ONCE AS NEEDED
Status: DISCONTINUED | OUTPATIENT
Start: 2017-11-17 | End: 2017-11-17 | Stop reason: HOSPADM

## 2017-11-17 RX ORDER — DEXAMETHASONE SODIUM PHOSPHATE 4 MG/ML
INJECTION, SOLUTION INTRA-ARTICULAR; INTRALESIONAL; INTRAMUSCULAR; INTRAVENOUS; SOFT TISSUE AS NEEDED
Status: DISCONTINUED | OUTPATIENT
Start: 2017-11-17 | End: 2017-11-17 | Stop reason: SURG

## 2017-11-17 RX ORDER — MIDAZOLAM HYDROCHLORIDE 1 MG/ML
INJECTION INTRAMUSCULAR; INTRAVENOUS AS NEEDED
Status: DISCONTINUED | OUTPATIENT
Start: 2017-11-17 | End: 2017-11-17 | Stop reason: SURG

## 2017-11-17 RX ORDER — IBUPROFEN 600 MG/1
600 TABLET ORAL EVERY 6 HOURS PRN
Status: DISCONTINUED | OUTPATIENT
Start: 2017-11-17 | End: 2017-11-17 | Stop reason: HOSPADM

## 2017-11-17 RX ORDER — HYDROCODONE BITARTRATE AND ACETAMINOPHEN 5; 325 MG/1; MG/1
1 TABLET ORAL EVERY 6 HOURS PRN
Qty: 12 TABLET | Refills: 0 | Status: SHIPPED | OUTPATIENT
Start: 2017-11-17 | End: 2017-11-27

## 2017-11-17 RX ORDER — KETOROLAC TROMETHAMINE 30 MG/ML
INJECTION, SOLUTION INTRAMUSCULAR; INTRAVENOUS AS NEEDED
Status: DISCONTINUED | OUTPATIENT
Start: 2017-11-17 | End: 2017-11-17 | Stop reason: SURG

## 2017-11-17 RX ORDER — ONDANSETRON 2 MG/ML
INJECTION INTRAMUSCULAR; INTRAVENOUS AS NEEDED
Status: DISCONTINUED | OUTPATIENT
Start: 2017-11-17 | End: 2017-11-17 | Stop reason: SURG

## 2017-11-17 RX ADMIN — MIDAZOLAM HYDROCHLORIDE 2 MG: 1 INJECTION, SOLUTION INTRAMUSCULAR; INTRAVENOUS at 07:22

## 2017-11-17 RX ADMIN — SODIUM CHLORIDE, POTASSIUM CHLORIDE, SODIUM LACTATE AND CALCIUM CHLORIDE 1000 ML: 600; 310; 30; 20 INJECTION, SOLUTION INTRAVENOUS at 06:39

## 2017-11-17 RX ADMIN — FENTANYL CITRATE 50 MCG: 50 INJECTION, SOLUTION INTRAMUSCULAR; INTRAVENOUS at 07:33

## 2017-11-17 RX ADMIN — PROPOFOL 75 MCG/KG/MIN: 10 INJECTION, EMULSION INTRAVENOUS at 07:26

## 2017-11-17 RX ADMIN — FENTANYL CITRATE 50 MCG: 50 INJECTION, SOLUTION INTRAMUSCULAR; INTRAVENOUS at 07:37

## 2017-11-17 RX ADMIN — MIDAZOLAM HYDROCHLORIDE 2 MG: 1 INJECTION, SOLUTION INTRAMUSCULAR; INTRAVENOUS at 07:30

## 2017-11-17 RX ADMIN — MEPERIDINE HYDROCHLORIDE 50 MG: 50 INJECTION INTRAMUSCULAR; INTRAVENOUS; SUBCUTANEOUS at 07:24

## 2017-11-17 RX ADMIN — ONDANSETRON 4 MG: 2 INJECTION INTRAMUSCULAR; INTRAVENOUS at 07:45

## 2017-11-17 RX ADMIN — Medication 25 MG: at 07:26

## 2017-11-17 RX ADMIN — DEXAMETHASONE SODIUM PHOSPHATE 8 MG: 4 INJECTION, SOLUTION INTRAMUSCULAR; INTRAVENOUS at 07:45

## 2017-11-17 RX ADMIN — KETOROLAC TROMETHAMINE 30 MG: 30 INJECTION, SOLUTION INTRAMUSCULAR at 07:31

## 2017-11-17 NOTE — H&P (VIEW-ONLY)
Subjective   Madalyn Villegas is a 42 y.o. female.     Cough   This is a new problem. Episode onset: one month. The problem has been gradually worsening. The problem occurs constantly. The cough is productive of sputum. Associated symptoms include chills, ear congestion, nasal congestion, rhinorrhea and a sore throat. Pertinent negatives include no chest pain, ear pain, fever, headaches, hemoptysis, rash, shortness of breath, sweats or wheezing. The symptoms are aggravated by lying down. Treatments tried: mucinex. The treatment provided moderate relief. Her past medical history is significant for bronchitis.        Current Outpatient Prescriptions on File Prior to Visit   Medication Sig Dispense Refill   • acetaminophen (TYLENOL) 500 MG tablet Take 500 mg by mouth every 6 (six) hours as needed for mild pain (1-3).     • albuterol (PROVENTIL HFA;VENTOLIN HFA) 108 (90 BASE) MCG/ACT inhaler Inhale 2 puffs every 4 (four) hours as needed for wheezing. 18 g 0   • atorvastatin (LIPITOR) 10 MG tablet Take 1 tablet by mouth Every Night. 30 tablet 5   • azelastine (ASTELIN) 0.1 % nasal spray 1 spray into each nostril 2 (Two) Times a Day.     • budesonide (RINOCORT AQUA) 32 MCG/ACT nasal spray 1 spray into each nostril Daily.     • chlorthalidone (HYGROTON) 25 MG tablet Take 1 tablet by mouth Daily. 30 tablet 5   • cholecalciferol (VITAMIN D3) 25904 UNITS capsule Take 1 capsule by mouth Daily. (Patient taking differently: Take 5,000 Units by mouth Daily.) 30 capsule 2   • esomeprazole (NexIUM) 20 MG capsule Take 20 mg by mouth every morning before breakfast.     • glucosamine-chondroitin 500-400 MG capsule capsule Take  by mouth 3 (three) times a day with meals.     • levocetirizine (XYZAL) 5 MG tablet Take 5 mg by mouth Every Evening.     • meloxicam (MOBIC) 15 MG tablet Take 1 tablet by mouth Daily. 30 tablet 5   • montelukast (SINGULAIR) 10 MG tablet      • Multiple Vitamin (ONE DAILY ESSENTIAL PO) Take  by mouth daily.      • TiZANidine (ZANAFLEX) 6 MG capsule Take 1 capsule by mouth 2 (Two) Times a Day. 60 capsule 5     No current facility-administered medications on file prior to visit.        No Known Allergies    Past Medical History:   Diagnosis Date   • Abnormal EKG    • Asthma     ASTHMA LIKE SYMPTOMS WITH SINUS INFECTION   • Chronic bronchitis    • GERD (gastroesophageal reflux disease)    • History of Papanicolaou smear of cervix 09/2017    WNL       • Hyperlipidemia    • Hypertension    • Kidney stones    • Lumbar degenerative disc disease    • Migraine    • Tobacco abuse        Past Surgical History:   Procedure Laterality Date   • COLONOSCOPY N/A 8/24/2017    Procedure: COLONOSCOPY;  Surgeon: Blas Thapa MD;  Location: [facility] ENDOSCOPY;  Service:    • KNEE ACL RECONSTRUCTION Right 07/2010   • WISDOM TOOTH EXTRACTION         Family History   Problem Relation Age of Onset   • Arthritis Mother    • Hyperlipidemia Mother    • Hypertension Mother    • Obesity Mother    • Heart attack Mother 64   • Hypertension Father    • Heart disease Father    • Heart attack Sister 34   • Hyperlipidemia Sister    • Diabetes Sister    • Hypertension Sister    • Obesity Sister    • Heart disease Sister    • Heart failure Sister    • Obesity Sister    • Diabetes Sister    • Diabetes type II Sister    • Stomach cancer Maternal Uncle    • Cancer Maternal Grandfather        Social History     Social History   • Marital status: Single     Spouse name: N/A   • Number of children: N/A   • Years of education: N/A     Occupational History   • Not on file.     Social History Main Topics   • Smoking status: Current Every Day Smoker     Packs/day: 0.75     Years: 25.00     Types: Cigarettes   • Smokeless tobacco: Never Used   • Alcohol use No   • Drug use: No   • Sexual activity: Yes     Partners: Male     Birth control/ protection: None     Other Topics Concern   • Not on file     Social History Narrative       Review of Systems  "  Constitutional: Positive for activity change and chills. Negative for appetite change, diaphoresis and fever.   HENT: Positive for congestion, rhinorrhea, sinus pressure and sore throat. Negative for ear pain.    Respiratory: Positive for cough. Negative for hemoptysis, shortness of breath and wheezing.    Cardiovascular: Negative for chest pain.   Gastrointestinal: Negative for abdominal pain, diarrhea, nausea and vomiting.   Skin: Negative for rash.   Neurological: Negative for headaches.       Pulse 88  Temp 99.4 °F (37.4 °C)  Resp 18  Ht 65\" (165.1 cm)  Wt 240 lb (109 kg)  LMP 10/05/2017  SpO2 98%  BMI 39.94 kg/m2    Objective   Physical Exam   Constitutional: She is oriented to person, place, and time. She appears well-developed and well-nourished. She is cooperative.   HENT:   Head: Normocephalic.   Right Ear: External ear and ear canal normal. A middle ear effusion is present.   Left Ear: External ear and ear canal normal. A middle ear effusion is present.   Nose: Rhinorrhea present. Right sinus exhibits frontal sinus tenderness. Right sinus exhibits no maxillary sinus tenderness. Left sinus exhibits frontal sinus tenderness. Left sinus exhibits no maxillary sinus tenderness.   Mouth/Throat: Uvula is midline and mucous membranes are normal. Posterior oropharyngeal erythema present.   Eyes: Conjunctivae, EOM and lids are normal.   Cardiovascular: Normal rate, regular rhythm and normal heart sounds.    Pulmonary/Chest: Effort normal and breath sounds normal. No accessory muscle usage. No respiratory distress.   Lymphadenopathy:     She has no cervical adenopathy.   Neurological: She is alert and oriented to person, place, and time.   Skin: Skin is warm, dry and intact.   Psychiatric: She has a normal mood and affect. Her speech is normal and behavior is normal.         Assessment/Plan   Madalyn was seen today for cough.    Diagnoses and all orders for this visit:    Acute non-recurrent frontal " sinusitis  -     amoxicillin-clavulanate (AUGMENTIN) 875-125 MG per tablet; Take 1 tablet by mouth 2 (Two) Times a Day for 10 days.  -     predniSONE (DELTASONE) 20 MG tablet; Take 1 tablet by mouth 3 (Three) Times a Day for 3 days.      Follow up with PCP or go to the nearest emergency room if symptoms worsen or fail to improve.

## 2017-11-17 NOTE — PLAN OF CARE
Problem: Perioperative Period (Adult)  Goal: Signs and Symptoms of Listed Potential Problems Will be Absent or Manageable (Perioperative Period)  Outcome: Ongoing (interventions implemented as appropriate)    11/17/17 0611   Perioperative Period   Problems Assessed (Perioperative Period) all   Problems Present (Perioperative Period) none

## 2017-11-17 NOTE — PLAN OF CARE
Problem: Perioperative Period (Adult)  Intervention: Promote Pulmonary Hygiene and Secretion Clearance    11/17/17 0804   Promote Aggressive Pulmonary Hygiene/Secretion Management   Cough And Deep Breathing done with encouragement   Positioning   Head Of Bed (HOB) Position HOB at 20 degrees       Intervention: Monitor/Manage Pain    11/17/17 0804   Safety Interventions   Medication Review/Management medications reviewed       Intervention: Monitor/Manage Postoperative Bleeding    11/17/17 0804   Safety Interventions   Bleeding Management dressing monitored       Intervention: Promote Normothermia    11/17/17 0754   Cardiac Interventions   Warming Thermoregulation Maintenance warm blankets applied;skin exposure time minimized

## 2017-11-17 NOTE — OP NOTE
LAURIE Villegas  : 1975  MRN: [id number]  [facility]: [id number]  Date: 2017    Operative Report    DILATATION AND CURETTAGE HYSTEROSCOPY NOVASURE ENDOMETRIAL ABLATION      Pre-op Diagnosis:  Dysmenorrhea [N94.6]  Menorrhagia with regular cycle [N92.0]   Post-op Diagnosis:  Post-Op Diagnosis Codes:     * Dysmenorrhea [N94.6]     * Menorrhagia with regular cycle [N92.0]   Procedure: Procedure(s):  DILATATION AND CURETTAGE HYSTEROSCOPY NOVASURE ENDOMETRIAL ABLATION   Surgeon: SUNG Louie M.D.   Assist: none   Anesthesia: Monitor Anesthesia Care   Estimated Blood Loss: <5 mls   ABx: none   Specimens:  Endometrial curettings   Findings: Polyp. [place] noted bilaterally   Complications: none   Indications: Menorrhagia/Dysmenorrhea/Endometrial polyp     Description of Procedure:  After the appropriate time out and adequate dosing of her anesthesia, the patient had been prepped and draped in the usual sterile fashion.  She was placed in the dorsal lithotomy position using Ridge stirrups.  The bladder had been drained with a red rubber catheter per nursing.  A weighted speculum was placed in the vagina.  The anterior lip of the cervix was grasped with a single-tooth tenaculum.  The cervix was injected at the 3 o'clock and 9 o'clock position with 1% lidocaine plain without any complications.  The cervix was then progressively dilated using Mcpherson dilators.  Rigid hysteroscopy was then performed with the above findings noted.  Sharp curettings were then obtained with a good cry throughout with tissue retrieved and sent for pathologic specimen.  The endometrial cavity length was assessed at 4.5 cms and the width had been assessed at 3.5 cms.  After adequate CO2 testing, the NovaSure ablation was performed for a total time of 1 minutes and 25 seconds. The device was removed.  Repeat hysteroscopy revealed good ablation extending to the cornu as well as the lower uterine segment.  The cervical tenaculum was  removed and the cervix was noted to be hemostatic after the application of 2-0 chromic suture in a figure-of-eight fashion.  All instrument and sponge counts were correct at the end of the procedure.  The patient tolerated the procedure well.  There were no complications.  She was taken to the postoperative recovery room in stable condition.    SUNG Louie M.D.  11/17/2017  7:47 AM

## 2017-11-17 NOTE — PLAN OF CARE
Problem: Patient Care Overview (Adult)  Goal: Plan of Care Review  Outcome: Ongoing (interventions implemented as appropriate)    11/17/17 0872   Outcome Evaluation   Outcome Summary/Follow up Plan pacu discharge criteria met.

## 2017-11-17 NOTE — ANESTHESIA PREPROCEDURE EVALUATION
Anesthesia Evaluation     Patient summary reviewed and Nursing notes reviewed   no history of anesthetic complications:  NPO Solid Status: > 8 hours  NPO Liquid Status: > 8 hours     Airway   Mallampati: II  TM distance: <3 FB  Neck ROM: full  possible difficult intubation, difficult intubation highly probable and small opening  Dental - normal exam   (+) poor dentition    Pulmonary    (+) a smoker Current Smoked day of surgery, COPD, asthma, shortness of breath, sleep apnea,   Cardiovascular     ECG reviewed    (+) hypertension, CAD ( inc risk morb obesity, [place], ? dm htn ), NOLAN, PVD, hyperlipidemia      Neuro/Psych  (+) headaches, numbness,    GI/Hepatic/Renal/Endo    (+) obesity, morbid obesity, GERD well controlled, renal disease stones,     Musculoskeletal     (+) arthralgias, back pain, chronic pain,   Abdominal    Substance History      OB/GYN          Other   (+) arthritis     ROS/Med Hx Other: Lab reviewed  ekg sr  cxr nad  Hx of med non compliance with lifestyle changes  Low pain tolerance and tolerance to pain meds  Echo Technically difficult study.     2.  Mild left ventricular hypertrophy with normal LV systolic function (EF     65%).      3.  Mild left atrial enlargement with normal left ventricular end-diastolic     pressures.     4.  Moderate dilatation of the right heart chambers with maintained RV systolic     function.       5.  Mild tricuspid insufficiency with no evidence of pulmonary hypertension.                                        Anesthesia Plan    ASA 3     MAC   (Risks and benefits discussed including risk of aspiration, recall and dental damage. All patient questions answered. Will continue with POC.)  intravenous induction   Anesthetic plan and risks discussed with patient.

## 2017-11-17 NOTE — ANESTHESIA POSTPROCEDURE EVALUATION
Patient: Madalyn Villegas    Procedure Summary     Date Anesthesia Start Anesthesia Stop Room / Location    11/17/17 0720   [facility]       Procedure Diagnosis Surgeon Provider    DILATATION AND CURETTAGE HYSTEROSCOPY NOVASURE ENDOMETRIAL ABLATION (N/A Vagina) Dysmenorrhea; Menorrhagia with regular cycle  (Dysmenorrhea [N94.6]; Menorrhagia with regular cycle [N92.0]) MD Marin Lemus CRNA          Anesthesia Type: MAC  Last vitals  BP   152/88 (11/17/17 0628)   Temp   97.5 °F (36.4 °C) (11/17/17 0628)   Pulse   83 (11/17/17 0628)   Resp   16 (11/17/17 0628)     SpO2   97 % (11/17/17 0628)     Post Anesthesia Care and Evaluation    Patient location during evaluation: PACU  Patient participation: complete - patient participated  Level of consciousness: awake  Pain score: 0  Pain management: adequate  Airway patency: patent  Anesthetic complications: No anesthetic complications  PONV Status: none  Cardiovascular status: acceptable  Respiratory status: acceptable  Hydration status: acceptable    Comments: vsss resp spont, reflexes intact, responsive, report given to pacu nurse

## 2017-11-19 DIAGNOSIS — I10 ESSENTIAL HYPERTENSION: ICD-10-CM

## 2017-11-20 ENCOUNTER — TRANSITIONAL CARE MANAGEMENT TELEPHONE ENCOUNTER (OUTPATIENT)
Dept: INTERNAL MEDICINE | Facility: CLINIC | Age: 42
End: 2017-11-20

## 2017-11-20 RX ORDER — CHLORTHALIDONE 25 MG/1
TABLET ORAL
Qty: 30 TABLET | Refills: 5 | Status: SHIPPED | OUTPATIENT
Start: 2017-11-20 | End: 2018-05-13 | Stop reason: SDUPTHER

## 2017-11-21 NOTE — OUTREACH NOTE
PJ call completed.  Please refer to TCM call flowsheet for call documentation.  Patient would like refills on atorvastatin, tizanidine and mobic sent to her pharmacy.

## 2017-11-23 LAB
LAB AP CASE REPORT: NORMAL
Lab: NORMAL
PATH REPORT.FINAL DX SPEC: NORMAL

## 2017-11-27 ENCOUNTER — OFFICE VISIT (OUTPATIENT)
Dept: OBSTETRICS AND GYNECOLOGY | Facility: CLINIC | Age: 42
End: 2017-11-27

## 2017-11-27 ENCOUNTER — OFFICE VISIT (OUTPATIENT)
Dept: FAMILY MEDICINE CLINIC | Facility: CLINIC | Age: 42
End: 2017-11-27

## 2017-11-27 VITALS
HEART RATE: 86 BPM | OXYGEN SATURATION: 98 % | WEIGHT: 243 LBS | SYSTOLIC BLOOD PRESSURE: 118 MMHG | TEMPERATURE: 99.9 F | BODY MASS INDEX: 40.48 KG/M2 | HEIGHT: 65 IN | DIASTOLIC BLOOD PRESSURE: 80 MMHG

## 2017-11-27 VITALS
WEIGHT: 242 LBS | DIASTOLIC BLOOD PRESSURE: 82 MMHG | BODY MASS INDEX: 40.32 KG/M2 | HEIGHT: 65 IN | SYSTOLIC BLOOD PRESSURE: 121 MMHG

## 2017-11-27 DIAGNOSIS — R53.83 FATIGUE, UNSPECIFIED TYPE: ICD-10-CM

## 2017-11-27 DIAGNOSIS — M79.7 FIBROMYALGIA: ICD-10-CM

## 2017-11-27 DIAGNOSIS — E87.6 HYPOKALEMIA: ICD-10-CM

## 2017-11-27 DIAGNOSIS — Z72.0 TOBACCO ABUSE: ICD-10-CM

## 2017-11-27 DIAGNOSIS — I10 ESSENTIAL HYPERTENSION: ICD-10-CM

## 2017-11-27 DIAGNOSIS — R10.31 RIGHT LOWER QUADRANT ABDOMINAL PAIN: ICD-10-CM

## 2017-11-27 DIAGNOSIS — E78.49 OTHER HYPERLIPIDEMIA: ICD-10-CM

## 2017-11-27 DIAGNOSIS — R19.7 DIARRHEA, UNSPECIFIED TYPE: ICD-10-CM

## 2017-11-27 DIAGNOSIS — Z09 POSTOP CHECK: Primary | ICD-10-CM

## 2017-11-27 DIAGNOSIS — E66.01 MORBID OBESITY (HCC): ICD-10-CM

## 2017-11-27 PROCEDURE — 99214 OFFICE O/P EST MOD 30 MIN: CPT | Performed by: NURSE PRACTITIONER

## 2017-11-27 PROCEDURE — 36415 COLL VENOUS BLD VENIPUNCTURE: CPT | Performed by: NURSE PRACTITIONER

## 2017-11-27 PROCEDURE — 99212 OFFICE O/P EST SF 10 MIN: CPT | Performed by: PHYSICIAN ASSISTANT

## 2017-11-27 RX ORDER — MELOXICAM 15 MG/1
15 TABLET ORAL DAILY
Qty: 30 TABLET | Refills: 5 | Status: SHIPPED | OUTPATIENT
Start: 2017-11-27 | End: 2018-11-12 | Stop reason: SDUPTHER

## 2017-11-27 RX ORDER — ATORVASTATIN CALCIUM 10 MG/1
10 TABLET, FILM COATED ORAL NIGHTLY
Qty: 30 TABLET | Refills: 5 | Status: SHIPPED | OUTPATIENT
Start: 2017-11-27 | End: 2018-09-26 | Stop reason: SDUPTHER

## 2017-11-27 RX ORDER — ATORVASTATIN CALCIUM 10 MG/1
10 TABLET, FILM COATED ORAL NIGHTLY
Qty: 30 TABLET | Refills: 5 | Status: SHIPPED | OUTPATIENT
Start: 2017-11-27 | End: 2017-11-27 | Stop reason: SDUPTHER

## 2017-11-27 RX ORDER — MELOXICAM 15 MG/1
15 TABLET ORAL DAILY
Qty: 30 TABLET | Refills: 5 | Status: SHIPPED | OUTPATIENT
Start: 2017-11-27 | End: 2017-11-27 | Stop reason: SDUPTHER

## 2017-11-27 NOTE — PROGRESS NOTES
Subjective   Madalyn Villegas is a 42 y.o. female.     HPI Comments: Patient is here today for her 6 month follow up on her hypertension and hyperlipidemia. She states she has been taking her Chlorthalidone for her hypertension and tolerating it well. She states she has been taking the Lipitor as directed and tolerating it well.     All of her vaccines are up to date, except the Influenza vaccine and she does not take it.     Patient states she had an ablation performed 10 days ago, and followed up with Dr Louie today. She is doing well with this and he and Dr Santana will monitor improvement of her GI and  symptoms and do further workup as needed.        The following portions of the patient's history were reviewed and updated as appropriate: allergies, current medications, past family history, past medical history, past social history, past surgical history and problem list.    Review of Systems   Constitutional: Negative.    HENT: Negative.    Eyes: Negative.    Respiratory: Negative.    Cardiovascular: Negative.    Gastrointestinal: Positive for abdominal pain and diarrhea. Negative for nausea, rectal pain and vomiting.   Genitourinary: Negative.    Musculoskeletal: Positive for arthralgias and myalgias. Negative for back pain, gait problem, joint swelling, neck pain and neck stiffness.   Skin: Negative.    Neurological: Negative for dizziness, syncope, weakness and numbness.   Hematological: Negative for adenopathy.   Psychiatric/Behavioral: Negative for confusion and suicidal ideas. The patient is not nervous/anxious.        Objective   Physical Exam   Constitutional: She is oriented to person, place, and time. She appears well-developed and well-nourished. No distress.   HENT:   Head: Normocephalic.   Right Ear: External ear normal.   Left Ear: External ear normal.   Nose: Nose normal.   Mouth/Throat: Oropharyngeal exudate present.   Eyes: Conjunctivae are normal.   Neck: Normal range of motion. Neck  supple. No tracheal deviation present. No thyromegaly present.   Cardiovascular: Normal rate, regular rhythm and normal heart sounds.    No murmur heard.  Pulmonary/Chest: Effort normal and breath sounds normal. No respiratory distress. She has no wheezes. She has no rales. She exhibits no tenderness.   Abdominal: Soft. Bowel sounds are normal. She exhibits no distension and no mass. There is no hepatosplenomegaly or splenomegaly. There is no tenderness. There is no rebound and no guarding. No hernia.   Musculoskeletal: Normal range of motion. She exhibits no edema or tenderness.   NROM all major joints   Lymphadenopathy:     She has no cervical adenopathy.        Right cervical: No superficial cervical, no deep cervical and no posterior cervical adenopathy present.       Left cervical: No superficial cervical, no deep cervical and no posterior cervical adenopathy present.   Neurological: She is alert and oriented to person, place, and time. Coordination and gait normal.   Skin: Skin is warm and dry. No rash noted.   Psychiatric: She has a normal mood and affect. Her behavior is normal. Judgment and thought content normal.       Assessment/Plan   Madalyn was seen today for follow-up and hypertension.    Diagnoses and all orders for this visit:    Essential hypertension  -     CBC (No Diff)  -     Comprehensive Metabolic Panel    Other hyperlipidemia  -     Discontinue: atorvastatin (LIPITOR) 10 MG tablet; Take 1 tablet by mouth Every Night.  -     Lipid Panel; Future  -     atorvastatin (LIPITOR) 10 MG tablet; Take 1 tablet by mouth Every Night.    Hypokalemia    Morbid obesity  -     TSH  -     T4, Free    Fatigue, unspecified type  -     TSH  -     T4, Free    Fibromyalgia  -     Discontinue: meloxicam (MOBIC) 15 MG tablet; Take 1 tablet by mouth Daily.  -     meloxicam (MOBIC) 15 MG tablet; Take 1 tablet by mouth Daily.    Tobacco abuse    Diarrhea, unspecified type    Right lower quadrant abdominal  pain      Screening labs obtained in the clinic today, except a FLP, to assess her chronic conditions and hypokalemia, and she will RTC fasting. I will contact patient regarding test results and provide instructions regarding any necessary changes in plan of care.    Refills given as needed today. Patient to continue all meds as directed.     Nutrition and activity goals reviewed including: mainly water to drink, limit white flour/processed sugar, high protein, high fiber carbs, good breakfast, working toward 150 mins cardio per week, weight training 2x/week.     Smoking cessation advised.  Pt voiced understanding of health risks of cont' smoking.     Patient to continue to follow with Dr Louie and Dr Santana for her GI symptoms not resolved after her ablation,  for further workup.     Patient was encouraged to keep me informed of any acute changes, lack of improvement, or any new concerning symptoms. Patient voiced understanding of all instructions and denied further questions.

## 2017-11-27 NOTE — PATIENT INSTRUCTIONS
Patient to call or rto for follow up TVS if pain worsens but would like to allow about 2 more weeks given recent surgery to see if resolves

## 2017-11-27 NOTE — PROGRESS NOTES
Subjective   Chief Complaint   Patient presents with   • Post-op     17:D&C Hysteroscopy with Novasure Ablation (Dr. Louie) Pt states she has been having some abdominal pain but it started before the surgery.        Madalyn Villegas is a 42 y.o. year old  presenting to be seen for post op visit  She is 10 days post op Hysteroscopy D&C Novasure ablation  She reports has had no vaginal bleeding or discharge, no fever or chills, has had normal bowel and bladder function  She has had some lower abdominal pain off and on and more RLQ  but had experienced this before surgery and had noted a small ovarian cyst on ultrasound 2 months ago  Pathology is benign      Past Medical History:   Diagnosis Date   • Abnormal EKG    • Asthma     ASTHMA LIKE SYMPTOMS WITH SINUS INFECTION   • Chronic bronchitis    • GERD (gastroesophageal reflux disease)    • History of being tatooed     RIGHT ANKLE   • History of Papanicolaou smear of cervix 2017    [facility]       • Hyperlipidemia    • Hypertension    • Kidney stones    • Lumbar degenerative disc disease    • Migraine    • Tobacco abuse         Current Outpatient Prescriptions:   •  acetaminophen (TYLENOL) 500 MG tablet, Take 500 mg by mouth every 6 (six) hours as needed for mild pain (1-3)., Disp: , Rfl:   •  albuterol (PROVENTIL HFA;VENTOLIN HFA) 108 (90 BASE) MCG/ACT inhaler, Inhale 2 puffs every 4 (four) hours as needed for wheezing., Disp: 18 g, Rfl: 0  •  atorvastatin (LIPITOR) 10 MG tablet, Take 1 tablet by mouth Every Night., Disp: 30 tablet, Rfl: 5  •  azelastine (ASTELIN) 0.1 % nasal spray, 1 spray into each nostril As Needed., Disp: , Rfl:   •  budesonide (RINOCORT AQUA) 32 MCG/ACT nasal spray, 1 spray into each nostril Daily., Disp: , Rfl:   •  chlorthalidone (HYGROTON) 25 MG tablet, TAKE ONE TABLET BY MOUTH ONCE DAILY, Disp: 30 tablet, Rfl: 5  •  cholecalciferol (VITAMIN D3) 78990 UNITS capsule, Take 1 capsule by mouth Daily. (Patient taking  differently: Take 2,000 Units by mouth Daily.), Disp: 30 capsule, Rfl: 2  •  esomeprazole (NexIUM) 20 MG capsule, Take 20 mg by mouth every morning before breakfast., Disp: , Rfl:   •  glucosamine-chondroitin 500-400 MG capsule capsule, Take 1 capsule by mouth Daily., Disp: , Rfl:   •  guaiFENesin (MUCINEX) 600 MG 12 hr tablet, Take 1,200 mg by mouth 2 (Two) Times a Day., Disp: , Rfl:   •  levocetirizine (XYZAL) 5 MG tablet, Take 5 mg by mouth Every Evening., Disp: , Rfl:   •  meloxicam (MOBIC) 15 MG tablet, Take 1 tablet by mouth Daily., Disp: 30 tablet, Rfl: 5  •  montelukast (SINGULAIR) 10 MG tablet, Take 10 mg by mouth Daily., Disp: , Rfl:   •  Multiple Vitamin (ONE DAILY ESSENTIAL PO), Take  by mouth daily., Disp: , Rfl:   •  TiZANidine (ZANAFLEX) 6 MG capsule, Take 1 capsule by mouth 2 (Two) Times a Day., Disp: 60 capsule, Rfl: 5  •  pseudoephedrine (SUDAFED) 30 MG tablet, Take 30 mg by mouth Every 4 (Four) Hours As Needed for Congestion ([employer] BRAND OF SUDAFED)., Disp: , Rfl:    No Known Allergies   Past Surgical History:   Procedure Laterality Date   • COLONOSCOPY N/A 8/24/2017    Procedure: COLONOSCOPY;  Surgeon: Blas Thapa MD;  Location: [facility] ENDOSCOPY;  Service:    • D&C HYSTEROSCOPY ENDOMETRIAL ABLATION N/A 11/17/2017    Procedure: DILATATION AND CURETTAGE HYSTEROSCOPY NOVASURE ENDOMETRIAL ABLATION;  Surgeon: Marin Louie MD;  Location: [facility] OR;  Service:    • KNEE ACL RECONSTRUCTION Right 07/2010   • WISDOM TOOTH EXTRACTION        Social History     Social History   • Marital status: Significant Other     Spouse name: N/A   • Number of children: N/A   • Years of education: N/A     Occupational History   • Not on file.     Social History Main Topics   • Smoking status: Current Every Day Smoker     Packs/day: 0.75     Years: 25.00     Types: Cigarettes   • Smokeless tobacco: Never Used   • Alcohol use No   • Drug use: No   • Sexual activity: Yes     Partners: Male     Birth control/  "protection: None     Other Topics Concern   • Not on file     Social History Narrative      Family History   Problem Relation Age of Onset   • Arthritis Mother    • Hyperlipidemia Mother    • Hypertension Mother    • Obesity Mother    • Heart attack Mother 64   • Hypertension Father    • Heart disease Father    • Heart attack Sister 34   • Hyperlipidemia Sister    • Diabetes Sister    • Hypertension Sister    • Obesity Sister    • Heart disease Sister    • Heart failure Sister    • Obesity Sister    • Diabetes Sister    • Diabetes type II Sister    • Stomach cancer Maternal Uncle    • Cancer Maternal Grandfather        Review of Systems   Constitutional: Negative.    Gastrointestinal: Negative.    Genitourinary: Positive for pelvic pain. Negative for vaginal bleeding and vaginal discharge.           Objective   /82  Ht 65\" (165.1 cm)  Wt 242 lb (110 kg)  LMP 11/04/2017 (Exact Date)  BMI 40.27 kg/m2    Physical Exam         Assessment and Plan  Madalyn was seen today for post-op.    Diagnoses and all orders for this visit:    Postop check    Patient Instructions   Patient to call or rto for follow up TVS if pain worsens but would like to allow about 2 more weeks given recent surgery to see if resolves             This note was electronically signed.    Vida Castro PA-C   November 27, 2017  "

## 2017-11-28 LAB
ALBUMIN SERPL-MCNC: 4.3 G/DL (ref 3.5–5.5)
ALBUMIN/GLOB SERPL: 1.9 {RATIO} (ref 1.2–2.2)
ALP SERPL-CCNC: 74 IU/L (ref 39–117)
ALT SERPL-CCNC: 51 IU/L (ref 0–32)
AST SERPL-CCNC: 34 IU/L (ref 0–40)
BILIRUB SERPL-MCNC: 0.3 MG/DL (ref 0–1.2)
BUN SERPL-MCNC: 15 MG/DL (ref 6–24)
BUN/CREAT SERPL: 27 (ref 9–23)
CALCIUM SERPL-MCNC: 10 MG/DL (ref 8.7–10.2)
CHLORIDE SERPL-SCNC: 97 MMOL/L (ref 96–106)
CO2 SERPL-SCNC: 26 MMOL/L (ref 18–29)
CREAT SERPL-MCNC: 0.56 MG/DL (ref 0.57–1)
ERYTHROCYTE [DISTWIDTH] IN BLOOD BY AUTOMATED COUNT: 13.3 % (ref 12.3–15.4)
GFR SERPLBLD CREATININE-BSD FMLA CKD-EPI: 114 ML/MIN/1.73
GFR SERPLBLD CREATININE-BSD FMLA CKD-EPI: 131 ML/MIN/1.73
GLOBULIN SER CALC-MCNC: 2.3 G/DL (ref 1.5–4.5)
GLUCOSE SERPL-MCNC: 87 MG/DL (ref 65–99)
HCT VFR BLD AUTO: 42.1 % (ref 34–46.6)
HGB BLD-MCNC: 14.3 G/DL (ref 11.1–15.9)
MCH RBC QN AUTO: 28.7 PG (ref 26.6–33)
MCHC RBC AUTO-ENTMCNC: 34 G/DL (ref 31.5–35.7)
MCV RBC AUTO: 84 FL (ref 79–97)
PLATELET # BLD AUTO: 264 X10E3/UL (ref 150–379)
POTASSIUM SERPL-SCNC: 3.8 MMOL/L (ref 3.5–5.2)
PROT SERPL-MCNC: 6.6 G/DL (ref 6–8.5)
RBC # BLD AUTO: 4.99 X10E6/UL (ref 3.77–5.28)
SODIUM SERPL-SCNC: 139 MMOL/L (ref 134–144)
T4 FREE SERPL-MCNC: 1.14 NG/DL (ref 0.82–1.77)
TSH SERPL DL<=0.005 MIU/L-ACNC: 1.84 UIU/ML (ref 0.45–4.5)
WBC # BLD AUTO: 11.2 X10E3/UL (ref 3.4–10.8)

## 2017-11-29 ENCOUNTER — TELEPHONE (OUTPATIENT)
Dept: FAMILY MEDICINE CLINIC | Facility: CLINIC | Age: 42
End: 2017-11-29

## 2017-11-29 NOTE — TELEPHONE ENCOUNTER
----- Message from RUFINO Lester sent at 11/28/2017 12:50 PM EST -----  Her labs look much better than last time but shows she needs to drink more water so increase her water intake.

## 2017-11-29 NOTE — TELEPHONE ENCOUNTER
Pt notified of results and she is going to come back in for lipid panel when she gets extra time off work. Stated it may be a week or so

## 2017-12-02 ENCOUNTER — RESULTS ENCOUNTER (OUTPATIENT)
Dept: FAMILY MEDICINE CLINIC | Facility: CLINIC | Age: 42
End: 2017-12-02

## 2017-12-02 DIAGNOSIS — E78.49 OTHER HYPERLIPIDEMIA: ICD-10-CM

## 2017-12-11 RX ORDER — TIZANIDINE HYDROCHLORIDE 6 MG/1
CAPSULE, GELATIN COATED ORAL
Qty: 90 CAPSULE | Refills: 2 | Status: SHIPPED | OUTPATIENT
Start: 2017-12-11 | End: 2018-05-24 | Stop reason: SDUPTHER

## 2018-04-30 ENCOUNTER — OFFICE VISIT (OUTPATIENT)
Dept: RETAIL CLINIC | Facility: CLINIC | Age: 43
End: 2018-04-30

## 2018-04-30 VITALS
TEMPERATURE: 98.8 F | OXYGEN SATURATION: 98 % | WEIGHT: 241 LBS | BODY MASS INDEX: 40.1 KG/M2 | RESPIRATION RATE: 16 BRPM | HEART RATE: 88 BPM

## 2018-04-30 DIAGNOSIS — J01.10 ACUTE NON-RECURRENT FRONTAL SINUSITIS: Primary | ICD-10-CM

## 2018-04-30 PROCEDURE — 99213 OFFICE O/P EST LOW 20 MIN: CPT | Performed by: NURSE PRACTITIONER

## 2018-04-30 RX ORDER — PREDNISONE 20 MG/1
20 TABLET ORAL 3 TIMES DAILY
Qty: 9 TABLET | Refills: 0 | Status: SHIPPED | OUTPATIENT
Start: 2018-04-30 | End: 2018-05-03

## 2018-04-30 RX ORDER — AMOXICILLIN AND CLAVULANATE POTASSIUM 875; 125 MG/1; MG/1
1 TABLET, FILM COATED ORAL 2 TIMES DAILY
Qty: 20 TABLET | Refills: 0 | Status: SHIPPED | OUTPATIENT
Start: 2018-04-30 | End: 2018-05-10

## 2018-04-30 NOTE — PROGRESS NOTES
Subjective   Madalyn Villegas is a 42 y.o. female.     Sinus Problem   This is a new problem. Episode onset: 1.5 weeks. The problem has been waxing and waning since onset. There has been no fever. Her pain is at a severity of 7/10. Associated symptoms include congestion, coughing, headaches, a hoarse voice, sinus pressure and a sore throat (mildy irritated). Pertinent negatives include no chills, diaphoresis, ear pain, neck pain, shortness of breath, sneezing or swollen glands. Treatments tried: tashi seltzer cold med, tylenol allergy  The treatment provided mild relief.        Current Outpatient Prescriptions on File Prior to Visit   Medication Sig Dispense Refill   • acetaminophen (TYLENOL) 500 MG tablet Take 500 mg by mouth every 6 (six) hours as needed for mild pain (1-3).     • albuterol (PROVENTIL HFA;VENTOLIN HFA) 108 (90 BASE) MCG/ACT inhaler Inhale 2 puffs every 4 (four) hours as needed for wheezing. 18 g 0   • atorvastatin (LIPITOR) 10 MG tablet Take 1 tablet by mouth Every Night. 30 tablet 5   • azelastine (ASTELIN) 0.1 % nasal spray 1 spray into each nostril As Needed.     • budesonide (RINOCORT AQUA) 32 MCG/ACT nasal spray 1 spray into each nostril Daily.     • chlorthalidone (HYGROTON) 25 MG tablet TAKE ONE TABLET BY MOUTH ONCE DAILY 30 tablet 5   • cholecalciferol (VITAMIN D3) 59830 UNITS capsule Take 1 capsule by mouth Daily. (Patient taking differently: Take 2,000 Units by mouth Daily.) 30 capsule 2   • esomeprazole (NexIUM) 20 MG capsule Take 20 mg by mouth every morning before breakfast.     • glucosamine-chondroitin 500-400 MG capsule capsule Take 1 capsule by mouth Daily.     • levocetirizine (XYZAL) 5 MG tablet Take 5 mg by mouth Every Evening.     • meloxicam (MOBIC) 15 MG tablet Take 1 tablet by mouth Daily. 30 tablet 5   • montelukast (SINGULAIR) 10 MG tablet Take 10 mg by mouth Daily.     • Multiple Vitamin (ONE DAILY ESSENTIAL PO) Take  by mouth daily.     • pseudoephedrine (SUDAFED) 30 MG  tablet Take 30 mg by mouth Every 4 (Four) Hours As Needed for Congestion ([employer] BRAND OF SUDAFED).     • TiZANidine (ZANAFLEX) 6 MG capsule TAKE ONE CAPSULE BY MOUTH THREE TIMES DAILY AS NEEDED FOR MUSCLE SPASM (DISCONTINUE 4 MG DOSE AND REPLACE WITH 6 MG) 90 capsule 2     No current facility-administered medications on file prior to visit.        No Known Allergies    Past Medical History:   Diagnosis Date   • Abnormal EKG    • Asthma     ASTHMA LIKE SYMPTOMS WITH SINUS INFECTION   • Chronic bronchitis    • GERD (gastroesophageal reflux disease)    • History of being tatooed     RIGHT ANKLE   • History of Papanicolaou smear of cervix 09/2017    [facility]       • Hyperlipidemia    • Hypertension    • Kidney stones    • Lumbar degenerative disc disease    • Migraine    • Tobacco abuse        Past Surgical History:   Procedure Laterality Date   • COLONOSCOPY N/A 8/24/2017    Procedure: COLONOSCOPY;  Surgeon: Blas Thapa MD;  Location: [facility] ENDOSCOPY;  Service:    • D&C HYSTEROSCOPY ENDOMETRIAL ABLATION N/A 11/17/2017    Procedure: DILATATION AND CURETTAGE HYSTEROSCOPY NOVASURE ENDOMETRIAL ABLATION;  Surgeon: Marin Louie MD;  Location: [facility] OR;  Service:    • KNEE ACL RECONSTRUCTION Right 07/2010   • WISDOM TOOTH EXTRACTION         Family History   Problem Relation Age of Onset   • Arthritis Mother    • Hyperlipidemia Mother    • Hypertension Mother    • Obesity Mother    • Heart attack Mother 64   • Hypertension Father    • Heart disease Father    • Heart attack Sister 34   • Hyperlipidemia Sister    • Diabetes Sister    • Hypertension Sister    • Obesity Sister    • Heart disease Sister    • Heart failure Sister    • Obesity Sister    • Diabetes Sister    • Diabetes type II Sister    • Stomach cancer Maternal Uncle    • Cancer Maternal Grandfather        Social History     Social History   • Marital status: Significant Other     Spouse name: N/A   • Number of children: N/A   • Years of  education: N/A     Occupational History   • Not on file.     Social History Main Topics   • Smoking status: Current Every Day Smoker     Packs/day: 0.75     Years: 25.00     Types: Cigarettes   • Smokeless tobacco: Never Used   • Alcohol use No   • Drug use: No   • Sexual activity: Yes     Partners: Male     Birth control/ protection: None     Other Topics Concern   • Not on file     Social History Narrative   • No narrative on file       Review of Systems   Constitutional: Negative for chills, diaphoresis and fever.   HENT: Positive for congestion, hoarse voice, rhinorrhea, sinus pressure and sore throat (mildy irritated). Negative for ear pain and sneezing.    Respiratory: Positive for cough. Negative for shortness of breath.    Cardiovascular: Negative for chest pain.   Gastrointestinal: Negative for abdominal pain, diarrhea, nausea and vomiting.   Musculoskeletal: Negative for neck pain.   Skin: Negative for rash.   Neurological: Positive for headaches.       Pulse 88   Temp 98.8 °F (37.1 °C)   Resp 16   Wt 109 kg (241 lb)   SpO2 98%   BMI 40.10 kg/m²     Objective   Physical Exam   Constitutional: She is oriented to person, place, and time. She appears well-developed and well-nourished. She is cooperative.   HENT:   Head: Normocephalic.   Right Ear: External ear and ear canal normal. A middle ear effusion is present.   Left Ear: External ear and ear canal normal. A middle ear effusion is present.   Nose: Rhinorrhea present. Right sinus exhibits frontal sinus tenderness. Right sinus exhibits no maxillary sinus tenderness. Left sinus exhibits frontal sinus tenderness. Left sinus exhibits no maxillary sinus tenderness.   Mouth/Throat: Uvula is midline, oropharynx is clear and moist and mucous membranes are normal. No tonsillar exudate.   Eyes: Conjunctivae, EOM and lids are normal.   Cardiovascular: Normal rate, regular rhythm and normal heart sounds.    Pulmonary/Chest: Effort normal and breath sounds  normal. No accessory muscle usage. No respiratory distress.   Lymphadenopathy:     She has no cervical adenopathy.   Neurological: She is alert and oriented to person, place, and time.   Skin: Skin is warm, dry and intact.   Psychiatric: She has a normal mood and affect. Her speech is normal and behavior is normal.         Assessment/Plan   Madalyn was seen today for sinus problem.    Diagnoses and all orders for this visit:    Acute non-recurrent frontal sinusitis  -     amoxicillin-clavulanate (AUGMENTIN) 875-125 MG per tablet; Take 1 tablet by mouth 2 (Two) Times a Day for 10 days.  -     predniSONE (DELTASONE) 20 MG tablet; Take 1 tablet by mouth 3 (Three) Times a Day for 3 days.        Follow up with PCP or go to the nearest emergency room if symptoms worsen or fail to improve.

## 2018-04-30 NOTE — PATIENT INSTRUCTIONS

## 2018-05-13 DIAGNOSIS — I10 ESSENTIAL HYPERTENSION: ICD-10-CM

## 2018-05-14 RX ORDER — CHLORTHALIDONE 25 MG/1
TABLET ORAL
Qty: 30 TABLET | Refills: 5 | Status: SHIPPED | OUTPATIENT
Start: 2018-05-14 | End: 2018-09-26 | Stop reason: SDUPTHER

## 2018-05-24 ENCOUNTER — OFFICE VISIT (OUTPATIENT)
Dept: FAMILY MEDICINE CLINIC | Facility: CLINIC | Age: 43
End: 2018-05-24

## 2018-05-24 DIAGNOSIS — IMO0001 CLASS 2 OBESITY DUE TO EXCESS CALORIES WITH SERIOUS COMORBIDITY AND BODY MASS INDEX (BMI) OF 39.0 TO 39.9 IN ADULT: ICD-10-CM

## 2018-05-24 DIAGNOSIS — Z72.0 TOBACCO ABUSE: ICD-10-CM

## 2018-05-24 DIAGNOSIS — J32.9 RECURRENT SINUSITIS: ICD-10-CM

## 2018-05-24 DIAGNOSIS — E78.49 OTHER HYPERLIPIDEMIA: ICD-10-CM

## 2018-05-24 DIAGNOSIS — E55.9 VITAMIN D DEFICIENCY: ICD-10-CM

## 2018-05-24 DIAGNOSIS — R51.9 FACIAL PAIN: ICD-10-CM

## 2018-05-24 DIAGNOSIS — I10 ESSENTIAL HYPERTENSION: Primary | ICD-10-CM

## 2018-05-24 DIAGNOSIS — M79.7 FIBROMYALGIA: ICD-10-CM

## 2018-05-24 DIAGNOSIS — R73.01 IFG (IMPAIRED FASTING GLUCOSE): ICD-10-CM

## 2018-05-24 DIAGNOSIS — N92.0 MENORRHAGIA WITH REGULAR CYCLE: ICD-10-CM

## 2018-05-24 PROCEDURE — 99215 OFFICE O/P EST HI 40 MIN: CPT | Performed by: FAMILY MEDICINE

## 2018-05-24 RX ORDER — PHENTERMINE HYDROCHLORIDE 37.5 MG/1
TABLET ORAL
Qty: 30 TABLET | Refills: 0 | Status: SHIPPED | OUTPATIENT
Start: 2018-05-24 | End: 2018-06-21 | Stop reason: SDUPTHER

## 2018-05-24 RX ORDER — TIZANIDINE HYDROCHLORIDE 6 MG/1
6 CAPSULE, GELATIN COATED ORAL 3 TIMES DAILY PRN
Qty: 90 CAPSULE | Refills: 2 | Status: SHIPPED | OUTPATIENT
Start: 2018-05-24 | End: 2018-09-26 | Stop reason: SDUPTHER

## 2018-05-24 NOTE — PATIENT INSTRUCTIONS
Phentermine tablets or capsules  What is this medicine?  PHENTERMINE (FEN ter meen) decreases your appetite. It is used with a reduced calorie diet and exercise to help you lose weight.  This medicine may be used for other purposes; ask your health care provider or pharmacist if you have questions.  COMMON BRAND NAME(S): Adipex-P, Atti-Plex P, Atti-Plex P Spansule, Fastin, Lomaira, Pro-Fast, Ashley-8  What should I tell my health care provider before I take this medicine?  They need to know if you have any of these conditions:  -agitation  -glaucoma  -heart disease  -high blood pressure  -history of substance abuse  -lung disease called Primary Pulmonary Hypertension (PPH)  -taken an MAOI like Carbex, Eldepryl, Marplan, Nardil, or Parnate in last 14 days  -thyroid disease  -an unusual or allergic reaction to phentermine, other medicines, foods, dyes, or preservatives  -pregnant or trying to get pregnant  -breast-feeding  How should I use this medicine?  Take this medicine by mouth with a glass of water. Follow the directions on the prescription label. The instructions for use may differ based on the product and dose you are taking. Avoid taking this medicine in the evening. It may interfere with sleep. Take your doses at regular intervals. Do not take your medicine more often than directed.  Talk to your pediatrician regarding the use of this medicine in children. While this drug may be prescribed for children 17 years or older for selected conditions, precautions do apply.  Overdosage: If you think you have taken too much of this medicine contact a poison control center or emergency room at once.  NOTE: This medicine is only for you. Do not share this medicine with others.  What if I miss a dose?  If you miss a dose, take it as soon as you can. If it is almost time for your next dose, take only that dose. Do not take double or extra doses.  What may interact with this medicine?  Do not take this medicine with any of  the following medications:  -duloxetine  -MAOIs like Carbex, Eldepryl, Marplan, Nardil, and Parnate  -medicines for colds or breathing difficulties like pseudoephedrine or phenylephrine  -procarbazine  -sibutramine  -SSRIs like citalopram, escitalopram, fluoxetine, fluvoxamine, paroxetine, and sertraline  -stimulants like dexmethylphenidate, methylphenidate or modafinil  -venlafaxine  This medicine may also interact with the following medications:  -medicines for diabetes  This list may not describe all possible interactions. Give your health care provider a list of all the medicines, herbs, non-prescription drugs, or dietary supplements you use. Also tell them if you smoke, drink alcohol, or use illegal drugs. Some items may interact with your medicine.  What should I watch for while using this medicine?  Notify your physician immediately if you become short of breath while doing your normal activities.  Do not take this medicine within 6 hours of bedtime. It can keep you from getting to sleep. Avoid drinks that contain caffeine and try to stick to a regular bedtime every night.  This medicine was intended to be used in addition to a healthy diet and exercise. The best results are achieved this way. This medicine is only indicated for short-term use. Eventually your weight loss may level out. At that point, the drug will only help you maintain your new weight. Do not increase or in any way change your dose without consulting your doctor.  You may get drowsy or dizzy. Do not drive, use machinery, or do anything that needs mental alertness until you know how this medicine affects you. Do not stand or sit up quickly, especially if you are an older patient. This reduces the risk of dizzy or fainting spells. Alcohol may increase dizziness and drowsiness. Avoid alcoholic drinks.  What side effects may I notice from receiving this medicine?  Side effects that you should report to your doctor or health care professional as  soon as possible:  -chest pain, palpitations  -depression or severe changes in mood  -increased blood pressure  -irritability  -nervousness or restlessness  -severe dizziness  -shortness of breath  -problems urinating  -unusual swelling of the legs  -vomiting  Side effects that usually do not require medical attention (report to your doctor or health care professional if they continue or are bothersome):  -blurred vision or other eye problems  -changes in sexual ability or desire  -constipation or diarrhea  -difficulty sleeping  -dry mouth or unpleasant taste  -headache  -nausea  This list may not describe all possible side effects. Call your doctor for medical advice about side effects. You may report side effects to FDA at 4-165-FDA-7603.  Where should I keep my medicine?  Keep out of the reach of children. This medicine can be abused. Keep your medicine in a safe place to protect it from theft. Do not share this medicine with anyone. Selling or giving away this medicine is dangerous and against the law.  This medicine may cause accidental overdose and death if taken by other adults, children, or pets. Mix any unused medicine with a substance like cat litter or coffee grounds. Then throw the medicine away in a sealed container like a sealed bag or a coffee can with a lid. Do not use the medicine after the expiration date.  Store at room temperature between 20 and 25 degrees C (68 and 77 degrees F). Keep container tightly closed.  NOTE: This sheet is a summary. It may not cover all possible information. If you have questions about this medicine, talk to your doctor, pharmacist, or health care provider.  © 2018 [employer] (2016-09-23 12:53:15)

## 2018-05-24 NOTE — PROGRESS NOTES
Subjective   Madalyn Villegas is a 43 y.o. female.     History of Present Illness  Ms. Villegas presents today for f/u on several chronic conditions. She was last seen by me approx 8 months ago for acute visit.     Fibromyalgia: Patient here for follow up on fibromyalgia. Patient has a > 1 year history of diffuse myofascial pain and fatigue: moderate: course over time: gradually worsening.. Onset was gradual. The symptoms are of moderate severity. They are made worse by: movement and overuse. They are helped by arthritis medications, heat, rest and muscle relaxants. The patient denies fever, truly inflamed joints, rash, localizing neurologic symptoms, unexplained weight loss. Previous treatments include OTC meds, prescription meds - see below, Physical Therapy and chiropractic therapy. Associated symptoms include arthralgia, fatigue, joint pain, morning stiffness and muscle weakness. Patient denies associated alopecia, bleeding/clotting problems, fevers, memory loss, nausea, new headache, nodules, oral ulcers, palpitations, pleurisy, rashes/photosensitive, Raynaud's and seizures. Evaluation to date includes lab eval. Recent interventions include medication changes, PT, etc. Limitation on activities include none. Has failed CYmbalta due to diarrhea. Also using mobic and muscle relaxant as needed which helps. Has assoc'd vit D def. Is taking replacement but at lower dose than rx'd.      Hypertension: Patient here for follow-up of elevated blood pressure. She is intermittently exercising and is not adherent to low salt diet. Blood pressure is well controlled at home. Cardiac symptoms fatigue. Patient denies chest pain, claudication, dyspnea, exertional chest pressure/discomfort, irregular heart beat, lower extremity edema, orthopnea, palpitations, syncope and tachypnea. Cardiovascular risk factors: dyslipidemia, hypertension, obesity (BMI >= 30 kg/m2) and sedentary lifestyle. Use of agents associated with hypertension:  NSAIDS. History of target organ damage: none. Taking meds as rx'd. Also found to have pre-diabetes. Not following diabetic appropriate diet.      Hyperlipidemia: Patient presents for f/u of hyperlipidemia. She was tested because of comorbid hypertension, obesity. Her last labs reviewed on chart. Previously tx included heart healthy diet and increased exercise. She has not complied with these recommendations. Cardiac symptoms as above. There is a family history of hyperlipidemia. There is a family history of early ischemia heart disease. SHe is taking statin as rx'd. Denies side effects.     She continues to have intermittent dry cough. PFT 6/2017 essentailly normal. Has recurrent bronchitis/URI/sinusitis episodes at least 3-4 per year assoc'd with marked bronchospasm. She denies h/o asthma, COPD. Nephew with asthma. C/o chronic nasal congestion, postnasal drip. Has night-time cough which waxes/wanes. Saw allergist in 1/2018, dx'd with RAD, allergic rhinitis. Currently on singulair, xyzal, rhinocort, astelin, proventil.     Continues to c/o heavy DUB. Seen by GYN 11/2017. Had ablation. Improved for a while and now recurred.     She would like to discuss possible assistance with weight loss.   Obesity: Patient complains of obesity. Patient cites health, increased physical ability as reasons for wanting to lose weight.    Obesity History  Weight in late teens: 180s.  Lowest adult weight: 175 at age 28  Highest adult weight: 250  Amount of time at present weight: several months     History of Weight Loss Efforts  Circumstances associated with regain of weight: stress, pain  Successful weight loss techniques attempted: none she has tried so far  Unsuccessful weight loss techniques attempted: nutritionist consultation, OTC appetite suppressants:  , self-directed dieting and very low calorie diet    Current Exercise Habits walking daily at work (works several hours, on feet walking during that entire time)    Current  Eating Habits  Number of regular meals per day: 1, sometimes 2  Number of snacking episodes per day: 2  Who shops for food? patient  Who prepares food? patient  Binge behavior?: yes -    Purge behavior? no  Anorexic behavior? no  Eating precipitated by stress? yes -    Guilt feelings associated with eating? yes -      Other Potential Contributing Factors  Use of alcohol: average 0 drinks/week  Use of medications that may cause weight gain none  Comorbidities: dyslipidemias, hypertension, infertility and pre-DM    The following portions of the patient's history were reviewed and updated as appropriate: allergies, current medications, past family history, past medical history, past social history, past surgical history and problem list.    Review of Systems   Constitutional: Positive for fatigue. Negative for chills, diaphoresis, fever and unexpected weight change.   HENT: Positive for congestion, postnasal drip, rhinorrhea and sinus pressure. Negative for ear pain, mouth sores, nosebleeds, sneezing, sore throat and trouble swallowing.    Eyes: Positive for pain and visual disturbance. Negative for redness and itching.   Respiratory: Positive for cough. Negative for shortness of breath and wheezing.    Cardiovascular: Negative for chest pain, palpitations and leg swelling.   Gastrointestinal: Negative for abdominal pain, diarrhea, nausea and vomiting.   Endocrine: Negative for polydipsia and polyuria.   Genitourinary: Negative for dysuria and hematuria.   Musculoskeletal: Positive for arthralgias, back pain and myalgias. Negative for neck pain.   Skin: Negative for rash and wound.   Neurological: Positive for headaches. Negative for dizziness, tremors, syncope and weakness.   Hematological: Negative for adenopathy. Does not bruise/bleed easily.   Psychiatric/Behavioral: Negative for dysphoric mood and sleep disturbance. The patient is not nervous/anxious.        Objective    Vitals:    05/24/18 1126   BP: 126/72    Temp: 96 °F (35.6 °C)   SpO2: 97%     Body mass index is 39.94 kg/m².  1    05/24/18  1126   Weight: 109 kg (240 lb)       Physical Exam   Constitutional: She is oriented to person, place, and time. She appears well-developed and well-nourished. She is cooperative. She does not appear ill. No distress.   obese   HENT:   Head: Normocephalic and atraumatic.   Right Ear: Tympanic membrane, external ear and ear canal normal.   Left Ear: Tympanic membrane, external ear and ear canal normal.   Nose: Mucosal edema present. No rhinorrhea.   Mouth/Throat: Oropharynx is clear and moist and mucous membranes are normal. No oral lesions.   Eyes: Conjunctivae, EOM and lids are normal.   Neck: Phonation normal. Neck supple. Normal carotid pulses present. No thyroid mass and no thyromegaly present.   Cardiovascular: Normal rate, regular rhythm, normal heart sounds and intact distal pulses.  Exam reveals no gallop.    No murmur heard.  Pulmonary/Chest: Effort normal and breath sounds normal.   Musculoskeletal:        Lumbar back: She exhibits tenderness (paraspinals), bony tenderness (mildly, L3-S1) and spasm. She exhibits normal range of motion.   Multiple areas of soft tissue TTP     Vascular Status -  Her right foot exhibits no edema. Her left foot exhibits no edema.  Lymphadenopathy:     She has no cervical adenopathy.   Neurological: She is alert and oriented to person, place, and time. She has normal strength. She displays no tremor. Gait normal.   Skin: Skin is warm and dry. No bruising and no rash noted.   Psychiatric: She has a normal mood and affect. Her behavior is normal. Cognition and memory are normal.   Nursing note and vitals reviewed.    EKG 11/2017 normal, reviewed on chart.  1 view CXR 11/2017 normal  TVUS 9/2017 - 2.8 cm complex right ovarian cyst, possible endometrial polyp  ECHO 8/2016- mild LVH, EF 65%, mild left atrial enlargement, moderate dilatation of right heart chambers, mild TI  No pulmonary  HTN    Assessment/Plan   Madalyn was seen today for hyperlipidemia, hypertension, fibromyalgia and nasal congestion.    Diagnoses and all orders for this visit:    Essential hypertension  -     CBC (No Diff)  -     Comprehensive Metabolic Panel  -     TSH Rfx On Abnormal To Free T4    Other hyperlipidemia  -     Comprehensive Metabolic Panel  -     Lipid Panel    Vitamin D deficiency  -     Vitamin D 25 Hydroxy    IFG (impaired fasting glucose)  -     Comprehensive Metabolic Panel  -     Hemoglobin A1c  -     Insulin, Total    Tobacco abuse    Fibromyalgia    Class 2 obesity due to excess calories with serious comorbidity and body mass index (BMI) of 39.0 to 39.9 in adult  -     TSH Rfx On Abnormal To Free T4    Menorrhagia with regular cycle  -     CBC (No Diff)  -     Iron    Recurrent sinusitis  -     CT sinuses limited study; Future    Facial pain  -     CT sinuses limited study; Future    Other orders  -     TiZANidine (ZANAFLEX) 6 MG capsule; Take 1 capsule by mouth 3 (Three) Times a Day As Needed for Muscle Spasms.  -     phentermine (ADIPEX-P) 37.5 MG tablet; 1/2 po bid or 1 po qd    HTN controlled.  HLP of unknown status; recheck FLP, continue statin.  Vit D def; Assess status of vit/min deficiencies and replace as indicated. Continue replacement.  IFG of unknown status. Recheck A1c.  RAD, chronic allergic rhinitis, recurrent sinusitis. Tobacco cessation advised.  Pt voiced understanding of health risks of cont'd tobacco use.  CT sinuses as above. Continue alubterol as needed. F/u with allergist as scheduled.  FMSx stable. Continue current meds.  Recurrent DUB, encouraged to f/u with GYN. Check for anemia, iron def.  Chronic obesity. Risks/benefits and potential side effects of various treatment options for obesity have been reviewed with patient.  I have also reviewed the necessity of consistent lifestyle modification (including willingness to exercise on a regular basis and adopt healthful dietary habits)  in order to attain and maintain a healthy BMI.  Patient voiced understanding and wishes to proceed with a trial of adipex while checking what other medications may be covered by her insurance. An informational handout specific to this medication has been provided for patient review.  FDA Medication Guide provided as indicated.  Nutrition and activity goals reviewed including: mainly water to drink, limit white flour/processed sugar, high protein, high fiber carbs, good breakfast, working toward 150 mins cardio per week, weight training 2x/week  As part of patient's treatment plan I am prescribing a controlled substance.  The patient has been made aware of the appropriate use of such medications, including potential risk of somnolence, limited ability to drive and/or work safely, and potential for dependence and/or overdose.  It has also been made clear that these medications are for use by this patient only, without concomitant use of alcohol or other substances, unless prescribed.  MARICRUZ report requested.  I will contact patient regarding test results and provide instructions regarding any necessary changes in plan of care.  Routine f/u in 1 month, sooner as needed/instructed.  Patient was encouraged to keep me informed of any acute changes, lack of improvement, or any new concerning symptoms.  Pt is aware of reasons to seek emergent care.  Patient voiced understanding of all instructions and denied further questions.    I coded a [id number] based on:  Comprehensive Hx  Comprehensive PEx  High Complexity MDM (worsening/new probs with further w/u planned, review or ordering of labs/radiology/EKG/TVUS/ECHO and review and summarization of old records as above).

## 2018-05-25 LAB
25(OH)D3+25(OH)D2 SERPL-MCNC: 30.6 NG/ML (ref 30–100)
ALBUMIN SERPL-MCNC: 4.7 G/DL (ref 3.5–5.5)
ALBUMIN/GLOB SERPL: 2 {RATIO} (ref 1.2–2.2)
ALP SERPL-CCNC: 75 IU/L (ref 39–117)
ALT SERPL-CCNC: 65 IU/L (ref 0–32)
AST SERPL-CCNC: 48 IU/L (ref 0–40)
BILIRUB SERPL-MCNC: 0.5 MG/DL (ref 0–1.2)
BUN SERPL-MCNC: 16 MG/DL (ref 6–24)
BUN/CREAT SERPL: 29 (ref 9–23)
CALCIUM SERPL-MCNC: 10.9 MG/DL (ref 8.7–10.2)
CHLORIDE SERPL-SCNC: 95 MMOL/L (ref 96–106)
CHOLEST SERPL-MCNC: 219 MG/DL (ref 100–199)
CO2 SERPL-SCNC: 26 MMOL/L (ref 18–29)
CREAT SERPL-MCNC: 0.55 MG/DL (ref 0.57–1)
ERYTHROCYTE [DISTWIDTH] IN BLOOD BY AUTOMATED COUNT: 13.5 % (ref 12.3–15.4)
GFR SERPLBLD CREATININE-BSD FMLA CKD-EPI: 114 ML/MIN/1.73
GFR SERPLBLD CREATININE-BSD FMLA CKD-EPI: 131 ML/MIN/1.73
GLOBULIN SER CALC-MCNC: 2.4 G/DL (ref 1.5–4.5)
GLUCOSE SERPL-MCNC: 93 MG/DL (ref 65–99)
HBA1C MFR BLD: 5.9 % (ref 4.8–5.6)
HCT VFR BLD AUTO: 45 % (ref 34–46.6)
HDLC SERPL-MCNC: 45 MG/DL
HGB BLD-MCNC: 15 G/DL (ref 11.1–15.9)
INSULIN SERPL-ACNC: 16.1 UIU/ML (ref 2.6–24.9)
IRON SERPL-MCNC: 132 UG/DL (ref 27–159)
LDLC SERPL CALC-MCNC: 116 MG/DL (ref 0–99)
MCH RBC QN AUTO: 28.2 PG (ref 26.6–33)
MCHC RBC AUTO-ENTMCNC: 33.3 G/DL (ref 31.5–35.7)
MCV RBC AUTO: 85 FL (ref 79–97)
PLATELET # BLD AUTO: 249 X10E3/UL (ref 150–379)
POTASSIUM SERPL-SCNC: 4.5 MMOL/L (ref 3.5–5.2)
PROT SERPL-MCNC: 7.1 G/DL (ref 6–8.5)
RBC # BLD AUTO: 5.31 X10E6/UL (ref 3.77–5.28)
SODIUM SERPL-SCNC: 141 MMOL/L (ref 134–144)
TRIGL SERPL-MCNC: 291 MG/DL (ref 0–149)
TSH SERPL DL<=0.005 MIU/L-ACNC: 1.16 UIU/ML (ref 0.45–4.5)
VLDLC SERPL CALC-MCNC: 58 MG/DL (ref 5–40)
WBC # BLD AUTO: 12.7 X10E3/UL (ref 3.4–10.8)

## 2018-05-26 VITALS
DIASTOLIC BLOOD PRESSURE: 72 MMHG | OXYGEN SATURATION: 97 % | HEIGHT: 65 IN | SYSTOLIC BLOOD PRESSURE: 126 MMHG | TEMPERATURE: 96 F | BODY MASS INDEX: 39.99 KG/M2 | WEIGHT: 240 LBS

## 2018-05-29 DIAGNOSIS — E66.01 MORBID OBESITY (HCC): Primary | ICD-10-CM

## 2018-05-29 DIAGNOSIS — R74.8 ABNORMAL LIVER ENZYMES: ICD-10-CM

## 2018-06-18 ENCOUNTER — HOSPITAL ENCOUNTER (OUTPATIENT)
Dept: ULTRASOUND IMAGING | Facility: HOSPITAL | Age: 43
Discharge: HOME | End: 2018-06-18
Admitting: FAMILY MEDICINE

## 2018-06-18 ENCOUNTER — HOSPITAL ENCOUNTER (OUTPATIENT)
Dept: CT IMAGING | Facility: HOSPITAL | Age: 43
Discharge: HOME | End: 2018-06-18

## 2018-06-18 DIAGNOSIS — E66.01 MORBID OBESITY (HCC): ICD-10-CM

## 2018-06-18 DIAGNOSIS — K82.4 GALLBLADDER POLYP: Primary | ICD-10-CM

## 2018-06-18 DIAGNOSIS — J32.0 CHRONIC MAXILLARY SINUSITIS: Primary | ICD-10-CM

## 2018-06-18 DIAGNOSIS — J32.9 RECURRENT SINUSITIS: ICD-10-CM

## 2018-06-18 DIAGNOSIS — R74.8 ABNORMAL LIVER ENZYMES: ICD-10-CM

## 2018-06-18 DIAGNOSIS — R51.9 FACIAL PAIN: ICD-10-CM

## 2018-06-18 PROBLEM — K76.0 FATTY LIVER: Status: ACTIVE | Noted: 2018-06-18

## 2018-06-18 PROCEDURE — 76705 ECHO EXAM OF ABDOMEN: CPT

## 2018-06-18 PROCEDURE — 76380 CAT SCAN FOLLOW-UP STUDY: CPT

## 2018-06-19 ENCOUNTER — RESULTS ENCOUNTER (OUTPATIENT)
Dept: FAMILY MEDICINE CLINIC | Facility: CLINIC | Age: 43
End: 2018-06-19

## 2018-06-19 DIAGNOSIS — J32.0 CHRONIC MAXILLARY SINUSITIS: ICD-10-CM

## 2018-06-20 ENCOUNTER — HOSPITAL ENCOUNTER (OUTPATIENT)
Dept: ULTRASOUND IMAGING | Facility: HOSPITAL | Age: 43
Discharge: HOME | End: 2018-06-20

## 2018-06-20 DIAGNOSIS — K82.4 GALLBLADDER POLYP: ICD-10-CM

## 2018-06-21 ENCOUNTER — OFFICE VISIT (OUTPATIENT)
Dept: FAMILY MEDICINE CLINIC | Facility: CLINIC | Age: 43
End: 2018-06-21

## 2018-06-21 VITALS
SYSTOLIC BLOOD PRESSURE: 118 MMHG | DIASTOLIC BLOOD PRESSURE: 78 MMHG | HEIGHT: 65 IN | BODY MASS INDEX: 39.49 KG/M2 | WEIGHT: 237 LBS

## 2018-06-21 DIAGNOSIS — J32.8 OTHER CHRONIC SINUSITIS: ICD-10-CM

## 2018-06-21 DIAGNOSIS — J34.2 NASAL SEPTAL DEVIATION: ICD-10-CM

## 2018-06-21 DIAGNOSIS — K76.0 FATTY LIVER: ICD-10-CM

## 2018-06-21 DIAGNOSIS — I10 ESSENTIAL HYPERTENSION: ICD-10-CM

## 2018-06-21 DIAGNOSIS — R73.01 IFG (IMPAIRED FASTING GLUCOSE): ICD-10-CM

## 2018-06-21 DIAGNOSIS — E66.01 MORBID OBESITY (HCC): Primary | ICD-10-CM

## 2018-06-21 DIAGNOSIS — K82.4 GALLBLADDER POLYP: ICD-10-CM

## 2018-06-21 PROCEDURE — 99214 OFFICE O/P EST MOD 30 MIN: CPT | Performed by: FAMILY MEDICINE

## 2018-06-21 RX ORDER — PHENTERMINE HYDROCHLORIDE 37.5 MG/1
TABLET ORAL
Qty: 30 TABLET | Refills: 0 | Status: SHIPPED | OUTPATIENT
Start: 2018-06-21 | End: 2018-07-23 | Stop reason: SDUPTHER

## 2018-06-21 NOTE — PROGRESS NOTES
"Subjective   Madalyn Villegas is a 43 y.o. female.     History of Present Illness   Mrs. Villegas presents today for f/u on obesity.  Obesity: Patient complains of obesity. Patient cites health, increased physical ability as reasons for wanting to lose weight.     Obesity History  Weight in late teens: 180s.  Lowest adult weight: 175 at age 28  Highest adult weight: 250  Amount of time at present weight: several months     History of Weight Loss Efforts  Circumstances associated with regain of weight: stress, pain  Successful weight loss techniques attempted: none she has tried so far  Unsuccessful weight loss techniques attempted: nutritionist consultation, OTC appetite suppressants:  , self-directed dieting and very low calorie diet     Current Exercise Habits walking daily at work (works several hours, on feet walking during that entire time)     Current Eating Habits  Number of regular meals per day: 1, sometimes 2  Number of snacking episodes per day: 2  Who shops for food? patient  Who prepares food? patient  Binge behavior?: yes -    Purge behavior? no  Anorexic behavior? no  Eating precipitated by stress? yes -    Guilt feelings associated with eating? yes -       Other Potential Contributing Factors  Use of alcohol: average 0 drinks/week  Use of medications that may cause weight gain none  Comorbidities: dyslipidemias, hypertension, infertility and pre-DM, fatty liver recently found on US    At last visit was started on adipex. She reports good appetite control. Has decreased sugar intake > 50%. Eating smaller portions. Down 3 lbs. Has troubling eating vegetables as she \"doesn't like them much\". She has noticed 2 inch decrease in waist.    Hypertension: Patient here for follow-up of elevated blood pressure. She is intermittently exercising and is not adherent to low salt diet. Blood pressure is well controlled at home. Cardiac symptoms fatigue. Patient denies chest pain, claudication, dyspnea, exertional " chest pressure/discomfort, irregular heart beat, lower extremity edema, orthopnea, palpitations, syncope and tachypnea. Cardiovascular risk factors: dyslipidemia, hypertension, obesity (BMI >= 30 kg/m2) and sedentary lifestyle. Use of agents associated with hypertension: NSAIDS. History of target organ damage: none. Taking meds as rx'd. Also found to have pre-diabetes.    Has had recent US for abnormal liver enzymes in setting of obesity. Also had CT sinuses due to symptoms of chronic sinusitis for > 6 months in setting of chronic allergic rhinitis.      The following portions of the patient's history were reviewed and updated as appropriate: allergies, current medications, past family history, past medical history, past social history, past surgical history and problem list.    Review of Systems   Constitutional: Positive for fatigue. Negative for chills, diaphoresis, fever and unexpected weight change.   HENT: Positive for congestion, postnasal drip, rhinorrhea and sinus pressure. Negative for ear pain, mouth sores, nosebleeds, sneezing, sore throat and trouble swallowing.    Eyes: Positive for pain and visual disturbance. Negative for redness and itching.   Respiratory: Positive for cough. Negative for shortness of breath and wheezing.    Cardiovascular: Negative for chest pain, palpitations and leg swelling.   Gastrointestinal: Negative for abdominal pain, diarrhea, nausea and vomiting.   Endocrine: Negative for polydipsia and polyuria.   Genitourinary: Negative for dysuria and hematuria.   Musculoskeletal: Positive for arthralgias, back pain and myalgias. Negative for neck pain.   Skin: Negative for rash and wound.   Neurological: Positive for headaches. Negative for dizziness, tremors, syncope and weakness.   Hematological: Negative for adenopathy. Does not bruise/bleed easily.   Psychiatric/Behavioral: Negative for dysphoric mood and sleep disturbance. The patient is not nervous/anxious.        Objective     Vitals:    06/21/18 0916   BP: 118/78     Body mass index is 39.44 kg/m².  1    06/21/18 0916   Weight: 108 kg (237 lb)       Physical Exam   Constitutional: She is oriented to person, place, and time. She appears well-developed and well-nourished. She is cooperative. She does not appear ill. No distress.   obese   HENT:   Head: Normocephalic and atraumatic.   Right Ear: Tympanic membrane, external ear and ear canal normal.   Left Ear: Tympanic membrane, external ear and ear canal normal.   Nose: Mucosal edema present. No rhinorrhea.   Mouth/Throat: Oropharynx is clear and moist and mucous membranes are normal. No oral lesions.   Eyes: Conjunctivae and lids are normal.   Neck: Phonation normal. Neck supple. Normal carotid pulses present. No thyroid mass and no thyromegaly present.   Cardiovascular: Normal rate, regular rhythm, normal heart sounds and intact distal pulses.  Exam reveals no gallop.    No murmur heard.  Pulmonary/Chest: Effort normal and breath sounds normal.   Abdominal: Soft. Bowel sounds are normal. She exhibits no distension and no mass (exam limited by body habitus). There is no hepatosplenomegaly (exam limited by body habitus). There is tenderness (mild) in the right upper quadrant.   Musculoskeletal:   Multiple areas of soft tissue TTP     Vascular Status -  Her right foot exhibits no edema. Her left foot exhibits no edema.  Lymphadenopathy:     She has no cervical adenopathy.   Neurological: She is alert and oriented to person, place, and time. She displays no tremor. Gait normal.   Skin: Skin is warm and dry. No bruising and no rash noted.   Psychiatric: She has a normal mood and affect. Her behavior is normal. Cognition and memory are normal.   Nursing note and vitals reviewed.    Lab Results   Component Value Date    WBC 11.2 (H) 11/27/2017    HGB 15.0 05/24/2018    HCT 45.0 05/24/2018    MCV 85 05/24/2018     05/24/2018     Lab Results   Component Value Date    GLUCOSE 96  11/09/2017    BUN 16 05/24/2018    CREATININE 0.55 (L) 05/24/2018    EGFRIFNONA 114 05/24/2018    EGFRIFAFRI 131 05/24/2018    BCR 29 (H) 05/24/2018    K 4.5 05/24/2018    CO2 26 05/24/2018    CALCIUM 10.9 (H) 05/24/2018    PROTENTOTREF 7.1 05/24/2018    ALBUMIN 4.7 05/24/2018    LABIL2 2.0 05/24/2018    AST 48 (H) 05/24/2018    ALT 65 (H) 05/24/2018     Lab Results   Component Value Date    TSH 1.160 05/24/2018     Lab Results   Component Value Date    CHOL 228 (H) 07/18/2016    CHLPL 219 (H) 05/24/2018    TRIG 291 (H) 05/24/2018    HDL 45 05/24/2018     (H) 05/24/2018     Lab Results   Component Value Date    HGBA1C 5.9 (H) 05/24/2018     US RUQ reviewed on chart with pt.  CT scan sinuses reviewed on chart with pt.    Assessment/Plan   Madalyn was seen today for weight check and results.    Diagnoses and all orders for this visit:    Morbid obesity    Other chronic sinusitis  -     Ambulatory Referral to ENT (Otolaryngology)    Nasal septal deviation  -     Ambulatory Referral to ENT (Otolaryngology)    Fatty liver    Gallbladder polyp    Essential hypertension    IFG (impaired fasting glucose)    Other orders  -     phentermine (ADIPEX-P) 37.5 MG tablet; 1/2 po bid or 1 po qd    HTN controlled.  IFG with increasing A1c. I have reiterated importance of increased daily physical activity as well as diabetic appropriate diet to prevent DM.   RAD, chronic allergic rhinitis, chronic sinusitis with nasal septal deviation/polyp on CT. Tobacco cessation advised.  Pt voiced understanding of health risks of cont'd tobacco use. Refer to ENT for tx rec's.  Gallbladder polyp of 6 mm. F/U US in 6 months.  Chronic obesity with good initial response to Adipiex as well as lifestyle modification. Nutrition and activity goals reviewed including: mainly water to drink, limit white flour/processed sugar, high protein, high fiber carbs, good breakfast, working toward 150 mins cardio per week, weight training 2x/week  As part of  patient's treatment plan I am prescribing a controlled substance.  The patient has been made aware of the appropriate use of such medications, including potential risk of somnolence, limited ability to drive and/or work safely, and potential for dependence and/or overdose.  It has also been made clear that these medications are for use by this patient only, without concomitant use of alcohol or other substances, unless prescribed.  MARICRUZ report reviewed and scanned into chart.  Last MARICRUZ date 6/21/18.  Routine f/u in 1 month, sooner as needed.  Patient was encouraged to keep me informed of any acute changes, lack of improvement, or any new concerning symptoms.  Pt is aware of reasons to seek emergent care.  Patient voiced understanding of all instructions and denied further questions.

## 2018-07-23 ENCOUNTER — OFFICE VISIT (OUTPATIENT)
Dept: FAMILY MEDICINE CLINIC | Facility: CLINIC | Age: 43
End: 2018-07-23

## 2018-07-23 VITALS
BODY MASS INDEX: 38.15 KG/M2 | DIASTOLIC BLOOD PRESSURE: 86 MMHG | WEIGHT: 229 LBS | OXYGEN SATURATION: 97 % | HEIGHT: 65 IN | SYSTOLIC BLOOD PRESSURE: 128 MMHG | HEART RATE: 78 BPM

## 2018-07-23 DIAGNOSIS — I10 ESSENTIAL HYPERTENSION: ICD-10-CM

## 2018-07-23 DIAGNOSIS — M79.7 FIBROMYALGIA: ICD-10-CM

## 2018-07-23 DIAGNOSIS — R73.03 PREDIABETES: ICD-10-CM

## 2018-07-23 DIAGNOSIS — K76.0 FATTY LIVER: ICD-10-CM

## 2018-07-23 DIAGNOSIS — R07.89 CHEST WALL PAIN: ICD-10-CM

## 2018-07-23 PROCEDURE — 99214 OFFICE O/P EST MOD 30 MIN: CPT | Performed by: FAMILY MEDICINE

## 2018-07-23 PROCEDURE — 96372 THER/PROPH/DIAG INJ SC/IM: CPT | Performed by: FAMILY MEDICINE

## 2018-07-23 RX ORDER — IBUPROFEN 600 MG/1
TABLET ORAL
COMMUNITY
End: 2019-12-11

## 2018-07-23 RX ORDER — PHENTERMINE HYDROCHLORIDE 37.5 MG/1
TABLET ORAL
Qty: 30 TABLET | Refills: 0 | Status: SHIPPED | OUTPATIENT
Start: 2018-07-23 | End: 2018-08-24 | Stop reason: SDUPTHER

## 2018-07-23 RX ORDER — AZELASTINE HYDROCHLORIDE AND FLUTICASONE PROPIONATE 137; 50 UG/1; UG/1
SPRAY, METERED NASAL 2 TIMES DAILY
COMMUNITY
Start: 2018-07-02 | End: 2022-05-23

## 2018-07-23 RX ORDER — METHYLPREDNISOLONE ACETATE 80 MG/ML
120 INJECTION, SUSPENSION INTRA-ARTICULAR; INTRALESIONAL; INTRAMUSCULAR; SOFT TISSUE ONCE
Status: COMPLETED | OUTPATIENT
Start: 2018-07-23 | End: 2018-07-23

## 2018-07-23 RX ORDER — KETOROLAC TROMETHAMINE 30 MG/ML
60 INJECTION, SOLUTION INTRAMUSCULAR; INTRAVENOUS ONCE
Status: COMPLETED | OUTPATIENT
Start: 2018-07-23 | End: 2018-07-23

## 2018-07-23 RX ORDER — PREDNISONE 10 MG/1
TABLET ORAL
Qty: 15 TABLET | Refills: 0 | Status: SHIPPED | OUTPATIENT
Start: 2018-07-23 | End: 2018-08-24

## 2018-07-23 RX ADMIN — KETOROLAC TROMETHAMINE 60 MG: 30 INJECTION, SOLUTION INTRAMUSCULAR; INTRAVENOUS at 13:55

## 2018-07-23 RX ADMIN — METHYLPREDNISOLONE ACETATE 120 MG: 80 INJECTION, SUSPENSION INTRA-ARTICULAR; INTRALESIONAL; INTRAMUSCULAR; SOFT TISSUE at 13:57

## 2018-07-23 NOTE — PROGRESS NOTES
"Subjective   Madalyn Villegas is a 43 y.o. female.     History of Present Illness  Mrs. Villegas presents today for weight check/mngt of obesity.  Obesity: Patient complains of obesity. Patient cites health, increased physical ability as reasons for wanting to lose weight.     Obesity History  Weight in late teens: 180s.  Lowest adult weight: 175 at age 28  Highest adult weight: 250  Amount of time at present weight: < 1 month     History of Weight Loss Efforts  Circumstances associated with regain of weight: stress, pain  Successful weight loss techniques attempted: none she has tried so far  Unsuccessful weight loss techniques attempted: nutritionist consultation, OTC appetite suppressants:  , self-directed dieting and very low calorie diet     Current Exercise Habits walking daily at work (works several hours, on feet walking during that entire time)     Current Eating Habits  Number of regular meals per day: 1, sometimes 2  Number of snacking episodes per day: 2  Who shops for food? patient  Who prepares food? patient  Binge behavior?: yes -    Purge behavior? no  Anorexic behavior? no  Eating precipitated by stress? yes -    Guilt feelings associated with eating? yes -       Other Potential Contributing Factors  Use of alcohol: average 0 drinks/week  Use of medications that may cause weight gain none  Comorbidities: dyslipidemias, hypertension, infertility and pre-DM, fatty liver on US     2 months ago was started on adipex. She reports good appetite control. Has decreased sugar intake > 50%. Eating smaller portions. Down additional 8 lbs for total of around 11 lbs. Has troubling eating vegetables as she \"doesn't like them much\".      Hypertension: Patient here for follow-up of elevated blood pressure. She is intermittently exercising and is not adherent to low salt diet. Blood pressure is well controlled at home. Cardiac symptoms fatigue. Patient denies chest pain, claudication, dyspnea, exertional chest " "pressure/discomfort, irregular heart beat, lower extremity edema, orthopnea, palpitations, syncope and tachypnea. Cardiovascular risk factors: dyslipidemia, hypertension, obesity (BMI >= 30 kg/m2) and sedentary lifestyle. Use of agents associated with hypertension: NSAIDS. History of target organ damage: none. Taking meds as rx'd. Also found to have pre-diabetes. Working on dietary changes as above.    She is being followed closely by ENT and will likely be having upcoming sinus surgery. Now on Dymista and singulair.    She c/o unusual chest pain. Began approx 10 days ago, at rest while driving in car, not anxious. Acute onset moderate sharp pain under left breast/left side. Assoc'd with SOA but no dizziness, palpitations, nausea, diaphoresis. No change with activity but is worse with reaching overhead, flexion of torso. Tx with heat, ice, OTC TENS unit and NSAIDs which helped \"a little\". Has remained constant but is decreasing in severity. Still painful with deep breath or pressing under left breast. No longer SOA.    The following portions of the patient's history were reviewed and updated as appropriate: allergies, current medications, past family history, past medical history, past social history, past surgical history and problem list.    Review of Systems   Constitutional: Positive for fatigue. Negative for chills, diaphoresis and fever.   HENT: Positive for congestion, postnasal drip and sinus pressure. Negative for ear pain, mouth sores, nosebleeds, rhinorrhea, sneezing, sore throat and trouble swallowing.    Eyes: Negative for redness and itching.   Respiratory: Negative for cough, shortness of breath and wheezing.    Cardiovascular: Negative for chest pain, palpitations and leg swelling.   Gastrointestinal: Negative for abdominal pain, diarrhea, nausea and vomiting.   Endocrine: Negative for polydipsia and polyuria.   Genitourinary: Negative for dysuria and hematuria.   Musculoskeletal: Positive for " arthralgias, back pain and myalgias. Negative for neck pain.   Skin: Negative for rash and wound.   Neurological: Positive for headaches. Negative for dizziness, tremors, syncope and weakness.   Hematological: Negative for adenopathy. Bruises/bleeds easily.   Psychiatric/Behavioral: Negative for dysphoric mood and sleep disturbance. The patient is not nervous/anxious.        Objective    Vitals:    07/23/18 0801   BP: 128/86   Pulse: 78   SpO2: 97%     Body mass index is 38.11 kg/m².  1    07/23/18 0801   Weight: 104 kg (229 lb)       Physical Exam   Constitutional: She is oriented to person, place, and time. She appears well-developed and well-nourished. She is cooperative. She does not appear ill. No distress.   obese   HENT:   Head: Normocephalic and atraumatic.   Mouth/Throat: Mucous membranes are normal. Mucous membranes are not dry.   Eyes: Conjunctivae and lids are normal.   Cardiovascular: Normal rate, regular rhythm, normal heart sounds and intact distal pulses.  Exam reveals no gallop.    No murmur heard.  Pulmonary/Chest: Effort normal and breath sounds normal. She exhibits tenderness (as demarcated). She exhibits no crepitus and no deformity.       Musculoskeletal:        Right shoulder: She exhibits tenderness (as demarcated) and spasm. She exhibits normal range of motion (but pain with reaching overhead), no bony tenderness, no deformity, normal pulse and normal strength.        Arms:  Multiple areas of soft tissue TTP     Vascular Status -  Her right foot exhibits no edema. Her left foot exhibits no edema.  Neurological: She is alert and oriented to person, place, and time. She has normal strength. She displays no tremor. No sensory deficit. Gait normal.   Skin: Skin is warm and dry. No bruising and no rash noted.   Psychiatric: She has a normal mood and affect. Her behavior is normal. Cognition and memory are normal.   Nursing note and vitals reviewed.      Assessment/Plan   Madalyn was seen today  for weight check, hyperlipidemia, hypertension, nasal congestion and muscle pain.    Diagnoses and all orders for this visit:    BMI 38.0-38.9,adult    Chest wall pain    Fibromyalgia    Essential hypertension    Fatty liver    Prediabetes    Other orders  -     phentermine (ADIPEX-P) 37.5 MG tablet; 1/2 po bid or 1 po qd  -     predniSONE (DELTASONE) 10 MG tablet; 5 po day 1, then decrease by 1 tablet each day until gone (5,4,3,2,1)    HTN controlled.    Pre-DM with A1c of 5.9 in May. I have reiterated importance of increased daily physical activity as well as diabetic appropriate diet to prevent DM. She voices understanding. Recheck A1c in August.    F/u with ENT/allergist as scheduled. Continue current meds.    Chest wall pain most consistent with muscle strain chest wall, torso. Very reproducible with palpation and particular ROM. Her job requires frequent overhead lifting, twisting etc. Conservative mgnt with NSAID, heat, ROM stretching, muscle relaxant, and POC Toradol 60 mg and Depomedrol 80 mg IM.     Chronic obesity with fatty liver, pre-DM. Good response to Adipiex as well as good efforts at lifestyle modification. Nutrition and activity goals reviewed including: mainly water to drink, limit white flour/processed sugar, high protein, high fiber carbs, good breakfast, working toward 150 mins cardio per week, weight training 2x/week  As part of patient's treatment plan I am prescribing a controlled substance.  The patient has been made aware of the appropriate use of such medications, including potential risk of somnolence, limited ability to drive and/or work safely, and potential for dependence and/or overdose.  It has also been made clear that these medications are for use by this patient only, without concomitant use of alcohol or other substances, unless prescribed.  MARICRUZ report reviewed and scanned into chart.  Last MARICRUZ date 7/23/18.    Routine f/u in 1 month, sooner as needed.  Patient was  encouraged to keep me informed of any acute changes, lack of improvement, or any new concerning symptoms.  Pt is aware of reasons to seek emergent care.  Patient voiced understanding of all instructions and denied further questions.

## 2018-08-24 ENCOUNTER — OFFICE VISIT (OUTPATIENT)
Dept: FAMILY MEDICINE CLINIC | Facility: CLINIC | Age: 43
End: 2018-08-24

## 2018-08-24 ENCOUNTER — TELEPHONE (OUTPATIENT)
Dept: FAMILY MEDICINE CLINIC | Facility: CLINIC | Age: 43
End: 2018-08-24

## 2018-08-24 VITALS
BODY MASS INDEX: 34.55 KG/M2 | HEART RATE: 94 BPM | DIASTOLIC BLOOD PRESSURE: 82 MMHG | OXYGEN SATURATION: 98 % | HEIGHT: 66 IN | WEIGHT: 215 LBS | SYSTOLIC BLOOD PRESSURE: 122 MMHG

## 2018-08-24 DIAGNOSIS — R73.03 PREDIABETES: ICD-10-CM

## 2018-08-24 DIAGNOSIS — M54.9 CHRONIC UPPER BACK PAIN: ICD-10-CM

## 2018-08-24 DIAGNOSIS — G89.29 CHRONIC CHEST WALL PAIN: ICD-10-CM

## 2018-08-24 DIAGNOSIS — G89.29 CHRONIC UPPER BACK PAIN: ICD-10-CM

## 2018-08-24 DIAGNOSIS — M25.512 CHRONIC PAIN OF BOTH SHOULDERS: ICD-10-CM

## 2018-08-24 DIAGNOSIS — G89.29 CHRONIC PAIN OF BOTH SHOULDERS: ICD-10-CM

## 2018-08-24 DIAGNOSIS — R07.89 CHRONIC CHEST WALL PAIN: ICD-10-CM

## 2018-08-24 DIAGNOSIS — IMO0001 CLASS 2 OBESITY DUE TO EXCESS CALORIES WITH SERIOUS COMORBIDITY AND BODY MASS INDEX (BMI) OF 35.0 TO 35.9 IN ADULT: Primary | ICD-10-CM

## 2018-08-24 DIAGNOSIS — I10 ESSENTIAL HYPERTENSION: ICD-10-CM

## 2018-08-24 DIAGNOSIS — G89.29 CHRONIC NECK PAIN: ICD-10-CM

## 2018-08-24 DIAGNOSIS — M54.2 CHRONIC NECK PAIN: ICD-10-CM

## 2018-08-24 DIAGNOSIS — N62 LARGE BREASTS: ICD-10-CM

## 2018-08-24 DIAGNOSIS — M25.511 CHRONIC PAIN OF BOTH SHOULDERS: ICD-10-CM

## 2018-08-24 LAB
EXPIRATION DATE: NORMAL
HBA1C MFR BLD: 5.5 %
Lab: NORMAL

## 2018-08-24 PROCEDURE — 99214 OFFICE O/P EST MOD 30 MIN: CPT | Performed by: FAMILY MEDICINE

## 2018-08-24 PROCEDURE — 83036 HEMOGLOBIN GLYCOSYLATED A1C: CPT | Performed by: FAMILY MEDICINE

## 2018-08-24 RX ORDER — PHENTERMINE HYDROCHLORIDE 37.5 MG/1
TABLET ORAL
Qty: 30 TABLET | Refills: 0 | Status: SHIPPED | OUTPATIENT
Start: 2018-08-24 | End: 2018-09-26 | Stop reason: SDUPTHER

## 2018-08-24 NOTE — PROGRESS NOTES
Subjective   Madalyn Villegas is a 43 y.o. female.     History of Present Illness  Mrs. Villegas presents today for weight check/mngt of obesity.  Obesity: Patient complains of obesity. Patient cites health, increased physical ability as reasons for wanting to lose weight.     Obesity History  Weight in late teens: 180s.  Lowest adult weight: 175 at age 28  Highest adult weight: 250  Amount of time at present weight: < 1 month     History of Weight Loss Efforts  Circumstances associated with regain of weight: stress, pain  Successful weight loss techniques attempted: none she has tried so far  Unsuccessful weight loss techniques attempted: nutritionist consultation, OTC appetite suppressants:  , self-directed dieting and very low calorie diet     Current Exercise Habits walking daily at work (works several hours, on feet walking during that entire time); has also joined gym, going faithfully at least 2 times per week, doing cardio     Current Eating Habits  Number of regular meals per day: 1, sometimes 2  Number of snacking episodes per day: 2  Who shops for food? patient  Who prepares food? patient  Binge behavior?: yes -    Purge behavior? no  Anorexic behavior? no  Eating precipitated by stress? yes -    Guilt feelings associated with eating? yes -       Other Potential Contributing Factors  Use of alcohol: average 0 drinks/week  Use of medications that may cause weight gain none  Comorbidities: dyslipidemias, hypertension, infertility and pre-DM, fatty liver on US     3 months ago was started on adipex. She reports good appetite control. Has decreased sugar intake > 50%. Aims for <125 total carbs per day. Eating smaller portions. Down additional 14 lbs for total of around 30 lbs since 11/2017.      She has comorbid pre-DM. Improving BG due to weight loss.    Has HTN which has also been well controlled. Taking med as rx'd.     She continues to c/o chest wall, rib, upper back and shoulder pain when trying to be  active. Breast size impedes ability to use weight machines, jog, perform household chores, etc. Has size 40-I bra. Smallest bra size as young adult 34DDD. She has questions about possible breast reduction surgery.     The following portions of the patient's history were reviewed and updated as appropriate: allergies, current medications, past family history, past medical history, past social history, past surgical history and problem list.    Review of Systems   Constitutional: Positive for fatigue. Negative for chills, diaphoresis and fever.   HENT: Positive for congestion, postnasal drip and sinus pressure. Negative for ear pain, mouth sores, nosebleeds, rhinorrhea, sneezing, sore throat and trouble swallowing.    Eyes: Negative for redness and itching.   Respiratory: Negative for cough, shortness of breath and wheezing.    Cardiovascular: Negative for chest pain, palpitations and leg swelling.   Gastrointestinal: Negative for abdominal pain, diarrhea, nausea and vomiting.   Endocrine: Negative for polydipsia and polyuria.   Genitourinary: Negative for dysuria and hematuria.   Musculoskeletal: Positive for arthralgias, back pain, myalgias, neck pain and neck stiffness.   Skin: Negative for rash and wound.   Neurological: Positive for headaches. Negative for dizziness, tremors, syncope and weakness.   Hematological: Negative for adenopathy. Bruises/bleeds easily.   Psychiatric/Behavioral: Negative for dysphoric mood and sleep disturbance. The patient is not nervous/anxious.        Objective    Vitals:    08/24/18 0848   BP: 122/82   Pulse: 94   SpO2: 98%     Body mass index is 35.23 kg/m².  1    08/24/18  0848   Weight: 97.5 kg (215 lb)           Physical Exam   Constitutional: She is oriented to person, place, and time. She appears well-developed and well-nourished. She is cooperative. She does not appear ill. No distress.   obese   HENT:   Head: Normocephalic and atraumatic.   Mouth/Throat: Mucous membranes are  normal. Mucous membranes are not dry.   Eyes: Conjunctivae and lids are normal.   Neck: Phonation normal. Neck supple. Normal carotid pulses present. No thyromegaly present.   Cardiovascular: Normal rate, regular rhythm, normal heart sounds and intact distal pulses.  Exam reveals no gallop.    No murmur heard.  Pulmonary/Chest: Effort normal and breath sounds normal. She exhibits tenderness (as demarcated).       Musculoskeletal:        Cervical back: She exhibits tenderness (diffuse soft tissue), deformity (loss of normal lordosis) and spasm. She exhibits normal range of motion and no bony tenderness.        Thoracic back: She exhibits tenderness, deformity (increased kyphosis) and spasm. She exhibits no bony tenderness.        Back:    Multiple areas of soft tissue TTP     Vascular Status -  Her right foot exhibits no edema. Her left foot exhibits no edema.  Lymphadenopathy:     She has no cervical adenopathy.   Neurological: She is alert and oriented to person, place, and time. She has normal strength. She displays no tremor. No sensory deficit. Gait normal.   Skin: Skin is warm and dry. No bruising and no rash noted.   Psychiatric: She has a normal mood and affect. Her behavior is normal. Cognition and memory are normal.   Good eye contact. Answers questions appropriately. Good personal hygiene and grooming.   Nursing note and vitals reviewed.    Lab Results   Component Value Date    HGBA1C 5.5 08/24/2018     Assessment/Plan   Madalyn was seen today for prediabetes and obesity.    Diagnoses and all orders for this visit:    Class 2 obesity due to excess calories with serious comorbidity and body mass index (BMI) of 35.0 to 35.9 in adult  -     POC Glycated Hemoglobin, Total    Prediabetes    Essential hypertension    Large breasts  -     Ambulatory Referral to Plastic Surgery    Chronic neck pain  -     Ambulatory Referral to Plastic Surgery    Chronic upper back pain  -     Ambulatory Referral to Plastic  Surgery    Chronic chest wall pain  -     Ambulatory Referral to Plastic Surgery    Chronic pain of both shoulders  -     Ambulatory Referral to Plastic Surgery    Other orders  -     phentermine (ADIPEX-P) 37.5 MG tablet; 1/2 po bid or 1 po qd    HTN controlled.     Pre-DM with improvement in A1c now down to 5.5 from 5.9 in May. I have reiterated importance of increased daily physical activity as well as diabetic appropriate diet to prevent DM. She voices understanding.       Chronic obesity with fatty liver, pre-DM. Good response to Adipiex as well as good efforts at lifestyle modification. Nutrition and activity goals reviewed including: mainly water to drink, limit white flour/processed sugar, high protein, high fiber carbs, good breakfast, working toward 150 mins cardio per week, weight training 2x/week  As part of patient's treatment plan I am prescribing a controlled substance.  The patient has been made aware of the appropriate use of such medications, including potential risk of somnolence, limited ability to drive and/or work safely, and potential for dependence and/or overdose.  It has also been made clear that these medications are for use by this patient only, without concomitant use of alcohol or other substances, unless prescribed.  MARICRUZ report reviewed and scanned into chart.  Last MARICRUZ date 7/23/18.    Chronic neck, chronic upper back, chronic bilateral shoulder, chronic chest wall pain which I feel is directly related to large breast size.  Feel referral for consultation regarding possible reduction mammoplasty reasonable.    Routine f/u in 1 month, sooner as needed.  Patient was encouraged to keep me informed of any acute changes, lack of improvement, or any new concerning symptoms.  Pt is aware of reasons to seek emergent care.  Patient voiced understanding of all instructions and denied further questions.          Please note that portions of this note may have been completed with a voice  recognition program. Efforts were made to edit the dictations, but occasionally words are mistranscribed.

## 2018-08-24 NOTE — TELEPHONE ENCOUNTER
Pt sts that she forgot to ask if you wanted her to continue her phentermine - and if so, she needs a refill sent to [facility] [place].  Please advise.

## 2018-09-26 ENCOUNTER — OFFICE VISIT (OUTPATIENT)
Dept: FAMILY MEDICINE CLINIC | Facility: CLINIC | Age: 43
End: 2018-09-26

## 2018-09-26 VITALS
SYSTOLIC BLOOD PRESSURE: 118 MMHG | OXYGEN SATURATION: 97 % | WEIGHT: 203 LBS | BODY MASS INDEX: 33.82 KG/M2 | HEART RATE: 97 BPM | HEIGHT: 65 IN | DIASTOLIC BLOOD PRESSURE: 78 MMHG

## 2018-09-26 DIAGNOSIS — S99.921A INJURY OF TOE ON RIGHT FOOT, INITIAL ENCOUNTER: ICD-10-CM

## 2018-09-26 DIAGNOSIS — E66.09 CLASS 1 OBESITY DUE TO EXCESS CALORIES WITH SERIOUS COMORBIDITY AND BODY MASS INDEX (BMI) OF 33.0 TO 33.9 IN ADULT: Primary | ICD-10-CM

## 2018-09-26 DIAGNOSIS — E78.49 OTHER HYPERLIPIDEMIA: ICD-10-CM

## 2018-09-26 DIAGNOSIS — R73.03 PREDIABETES: ICD-10-CM

## 2018-09-26 DIAGNOSIS — I10 ESSENTIAL HYPERTENSION: ICD-10-CM

## 2018-09-26 PROCEDURE — 99214 OFFICE O/P EST MOD 30 MIN: CPT | Performed by: FAMILY MEDICINE

## 2018-09-26 RX ORDER — PHENTERMINE HYDROCHLORIDE 37.5 MG/1
TABLET ORAL
Qty: 30 TABLET | Refills: 0 | Status: SHIPPED | OUTPATIENT
Start: 2018-09-26 | End: 2018-11-12 | Stop reason: SDUPTHER

## 2018-09-26 RX ORDER — CHLORTHALIDONE 25 MG/1
25 TABLET ORAL DAILY
Qty: 30 TABLET | Refills: 5 | Status: SHIPPED | OUTPATIENT
Start: 2018-09-26 | End: 2019-03-15 | Stop reason: SDUPTHER

## 2018-09-26 RX ORDER — TIZANIDINE HYDROCHLORIDE 6 MG/1
6 CAPSULE, GELATIN COATED ORAL 3 TIMES DAILY PRN
Qty: 90 CAPSULE | Refills: 2 | Status: SHIPPED | OUTPATIENT
Start: 2018-09-26 | End: 2019-03-15 | Stop reason: SDUPTHER

## 2018-09-26 RX ORDER — ATORVASTATIN CALCIUM 10 MG/1
10 TABLET, FILM COATED ORAL NIGHTLY
Qty: 30 TABLET | Refills: 5 | Status: SHIPPED | OUTPATIENT
Start: 2018-09-26 | End: 2019-03-15 | Stop reason: SDUPTHER

## 2018-09-26 NOTE — PROGRESS NOTES
Subjective   Madalyn Villegas is a 43 y.o. female.     History of Present Illness  Mrs. Villegas presents today for weight check/mngt of obesity.  Obesity: Patient complains of obesity. Patient cites health, increased physical ability as reasons for wanting to lose weight.  She is also working toward qualification for reduction mammoplasty due to chronic neck, upper back and chest wall pain.     Obesity History  Weight in late teens: 180s.  Lowest adult weight: 175 at age 28  Highest adult weight: 250  Amount of time at present weight: < 1 month     History of Weight Loss Efforts  Circumstances associated with regain of weight: stress, pain  Successful weight loss techniques attempted: none she has tried so far  Unsuccessful weight loss techniques attempted: nutritionist consultation, OTC appetite suppressants:  , self-directed dieting and very low calorie diet     Current Exercise Habits walking daily at work (works several hours, on feet walking during that entire time); has also joined gym, going faithfully at least 2-3 times per week, doing cardio for approx 1 hour     Current Eating Habits  Number of regular meals per day: 1, sometimes 2  Number of snacking episodes per day: 2  Who shops for food? patient  Who prepares food? patient  Binge behavior?: yes -    Purge behavior? no  Anorexic behavior? no  Eating precipitated by stress? yes -    Guilt feelings associated with eating? yes -       Other Potential Contributing Factors  Use of alcohol: average 0 drinks/week  Use of medications that may cause weight gain none  Comorbidities: dyslipidemias, hypertension, infertility and pre-DM, fatty liver on US     4 months ago was started on adipex. She reports good appetite control. Has decreased sugar intake > 50%. Aims for <100 total carbs per day. Eating smaller portions. Down additional 12 lbs for total of around 35 lbs since 11/2017.      She has comorbid pre-DM. Improving BG due to weight loss.     Has HTN which  "has also been well controlled. Taking med as rx'd. Tolerating statin for HLP.    She also complains of \"infection\" in the fourth digit right foot.  Again having pain and swelling along the nailbed after trauma from the clippers.  Has had intermittent purulent drainage.  Overall improved but not completely resolving.     The following portions of the patient's history were reviewed and updated as appropriate: allergies, current medications, past family history, past medical history, past social history, past surgical history and problem list.    Review of Systems   Constitutional: Positive for fatigue. Negative for chills, diaphoresis and fever.   HENT: Positive for congestion, postnasal drip and sinus pressure. Negative for ear pain, mouth sores, nosebleeds, rhinorrhea, sneezing, sore throat and trouble swallowing.    Eyes: Negative for redness and itching.   Respiratory: Negative for cough, shortness of breath and wheezing.    Cardiovascular: Negative for chest pain, palpitations and leg swelling.   Gastrointestinal: Negative for abdominal pain, diarrhea, nausea and vomiting.   Endocrine: Negative for polydipsia and polyuria.   Genitourinary: Negative for dysuria and hematuria.   Musculoskeletal: Positive for arthralgias, back pain, myalgias, neck pain and neck stiffness.   Skin: Negative for rash and wound.   Neurological: Positive for headaches. Negative for dizziness, tremors, syncope and weakness.   Hematological: Negative for adenopathy. Bruises/bleeds easily.   Psychiatric/Behavioral: Negative for dysphoric mood and sleep disturbance. The patient is not nervous/anxious.        Objective    Vitals:    09/26/18 0844   BP: 118/78   Pulse: 97   SpO2: 97%     Body mass index is 33.78 kg/m².  1    09/26/18  0844   Weight: 92.1 kg (203 lb)       Physical Exam   Constitutional: She is oriented to person, place, and time. She appears well-developed and well-nourished. She is cooperative. She does not appear ill. No " distress.   obese   HENT:   Head: Normocephalic and atraumatic.   Mouth/Throat: Mucous membranes are normal. Mucous membranes are not dry.   Eyes: Conjunctivae and lids are normal.   Neck: Phonation normal. Neck supple. Normal carotid pulses present. No thyromegaly present.   Cardiovascular: Normal rate, regular rhythm, normal heart sounds and intact distal pulses.  Exam reveals no gallop.    No murmur heard.  Pulmonary/Chest: Effort normal and breath sounds normal.   Musculoskeletal:   Multiple areas of soft tissue TTP     Vascular Status -  Her right foot exhibits no edema. Her left foot exhibits no edema.     Lymphadenopathy:     She has no cervical adenopathy.   Neurological: She is alert and oriented to person, place, and time. She has normal strength. She displays no tremor. No sensory deficit. Gait normal.   Skin: Skin is warm and dry. No bruising and no rash noted.   Psychiatric: She has a normal mood and affect. Her behavior is normal. Cognition and memory are normal.   Good eye contact. Answers questions appropriately. Good personal hygiene and grooming.   Nursing note and vitals reviewed.    Lab Results   Component Value Date    WBC 11.2 (H) 11/27/2017    HGB 15.0 05/24/2018    HCT 45.0 05/24/2018    MCV 85 05/24/2018     05/24/2018     Lab Results   Component Value Date    GLUCOSE 96 11/09/2017    BUN 16 05/24/2018    CREATININE 0.55 (L) 05/24/2018    EGFRIFNONA 114 05/24/2018    EGFRIFAFRI 131 05/24/2018    BCR 29 (H) 05/24/2018    K 4.5 05/24/2018    CO2 26 05/24/2018    CALCIUM 10.9 (H) 05/24/2018    PROTENTOTREF 7.1 05/24/2018    ALBUMIN 4.7 05/24/2018    LABIL2 2.0 05/24/2018    AST 48 (H) 05/24/2018    ALT 65 (H) 05/24/2018     Lab Results   Component Value Date    TSH 1.160 05/24/2018     Lab Results   Component Value Date    HGBA1C 5.5 08/24/2018     Lab Results   Component Value Date    CHOL 228 (H) 07/18/2016    CHLPL 219 (H) 05/24/2018    TRIG 291 (H) 05/24/2018    HDL 45 05/24/2018      (H) 05/24/2018     Assessment/Plan   Madalyn was seen today for weight check and toe pain.    Diagnoses and all orders for this visit:    Class 1 obesity due to excess calories with serious comorbidity and body mass index (BMI) of 33.0 to 33.9 in adult    Other hyperlipidemia  -     atorvastatin (LIPITOR) 10 MG tablet; Take 1 tablet by mouth Every Night.    Essential hypertension  -     chlorthalidone (HYGROTON) 25 MG tablet; Take 1 tablet by mouth Daily.    Prediabetes    Injury of toe on right foot, initial encounter    Other orders  -     TiZANidine (ZANAFLEX) 6 MG capsule; Take 1 capsule by mouth 3 (Three) Times a Day As Needed for Muscle Spasms.  -     phentermine (ADIPEX-P) 37.5 MG tablet; 1/2 po bid or 1 po qd    HTN controlled. HLP with good tolerance of statin.     Pre-DM with improvement in A1c now down to 5.5 from 5.9 in May. I have reiterated importance of increased daily physical activity as well as diabetic appropriate diet to prevent DM. She voices understanding.      Appropriate foot/wound care reviewed. She is to report signs of possible abscess, cellulitis.     Chronic obesity with fatty liver, pre-DM. Good response to Adipiex as well as good efforts at lifestyle modification. Nutrition and activity goals reviewed including: mainly water to drink, limit white flour/processed sugar, high protein, high fiber carbs, good breakfast, working toward 150 mins cardio per week, weight training 2x/week  As part of patient's treatment plan I am prescribing a controlled substance. The patient has been made aware of the appropriate use of such medications, including potential risk of somnolence, limited ability to drive and/or work safely, and potential for dependence and/or overdose.  It has also been made clear that these medications are for use by this patient only, without concomitant use of alcohol or other substances, unless prescribed.  MARICRUZ report reviewed and scanned into chart.  Last MARICRUZ  date 9/26/18.     Routine f/u in 1 month, sooner as needed.  Patient was encouraged to keep me informed of any acute changes, lack of improvement, or any new concerning symptoms.  Pt is aware of reasons to seek emergent care.  Patient voiced understanding of all instructions and denied further questions.

## 2018-10-02 DIAGNOSIS — L08.9 TOE INFECTION: Primary | ICD-10-CM

## 2018-10-02 RX ORDER — CEPHALEXIN 500 MG/1
500 CAPSULE ORAL 3 TIMES DAILY
Qty: 30 CAPSULE | Refills: 0 | Status: SHIPPED | OUTPATIENT
Start: 2018-10-02 | End: 2018-10-12

## 2018-11-12 ENCOUNTER — OFFICE VISIT (OUTPATIENT)
Dept: FAMILY MEDICINE CLINIC | Facility: CLINIC | Age: 43
End: 2018-11-12

## 2018-11-12 VITALS
WEIGHT: 190 LBS | OXYGEN SATURATION: 97 % | HEIGHT: 65 IN | SYSTOLIC BLOOD PRESSURE: 126 MMHG | BODY MASS INDEX: 31.65 KG/M2 | DIASTOLIC BLOOD PRESSURE: 82 MMHG | HEART RATE: 90 BPM

## 2018-11-12 DIAGNOSIS — E78.49 OTHER HYPERLIPIDEMIA: ICD-10-CM

## 2018-11-12 DIAGNOSIS — E55.9 VITAMIN D DEFICIENCY: ICD-10-CM

## 2018-11-12 DIAGNOSIS — R73.03 PREDIABETES: ICD-10-CM

## 2018-11-12 DIAGNOSIS — E66.09 CLASS 1 OBESITY DUE TO EXCESS CALORIES WITH SERIOUS COMORBIDITY AND BODY MASS INDEX (BMI) OF 31.0 TO 31.9 IN ADULT: ICD-10-CM

## 2018-11-12 DIAGNOSIS — M79.7 FIBROMYALGIA: ICD-10-CM

## 2018-11-12 DIAGNOSIS — Z72.0 TOBACCO ABUSE: ICD-10-CM

## 2018-11-12 DIAGNOSIS — K76.0 FATTY LIVER: ICD-10-CM

## 2018-11-12 DIAGNOSIS — I10 ESSENTIAL HYPERTENSION: Primary | ICD-10-CM

## 2018-11-12 PROCEDURE — 99214 OFFICE O/P EST MOD 30 MIN: CPT | Performed by: FAMILY MEDICINE

## 2018-11-12 RX ORDER — PHENTERMINE HYDROCHLORIDE 37.5 MG/1
TABLET ORAL
Qty: 30 TABLET | Refills: 0 | Status: SHIPPED | OUTPATIENT
Start: 2018-11-12 | End: 2019-01-15

## 2018-11-12 RX ORDER — EPINEPHRINE 0.3 MG/.3ML
INJECTION SUBCUTANEOUS
COMMUNITY
Start: 2018-09-27

## 2018-11-12 RX ORDER — MELOXICAM 15 MG/1
15 TABLET ORAL DAILY
Qty: 90 TABLET | Refills: 1 | Status: SHIPPED | OUTPATIENT
Start: 2018-11-12 | End: 2019-01-15 | Stop reason: SDUPTHER

## 2018-11-12 NOTE — PROGRESS NOTES
Subjective   Madalyn Villegas is a 43 y.o. female.     History of Present Illness  Mrs. Villegas presents today for weight check/mngt of obesity.  Obesity: Patient complains of obesity. Patient cites health, increased physical ability as reasons for wanting to lose weight.  She is also working toward qualification for reduction mammoplasty due to chronic neck, upper back and chest wall pain.     Obesity History  Weight in late teens: 180s.  Lowest adult weight: 175 at age 28  Highest adult weight: 250  Amount of time at present weight: < 1 month     History of Weight Loss Efforts  Circumstances associated with regain of weight: stress, pain  Successful weight loss techniques attempted: none she has tried so far  Unsuccessful weight loss techniques attempted: nutritionist consultation, OTC appetite suppressants:  , self-directed dieting and very low calorie diet     Current Exercise Habits walking daily at work (works several hours, on feet walking during that entire time); has also joined gym, going faithfully at least 3 times per week, doing cardio twice per week and mixture of resistance training at least 1 day per week     Current Eating Habits  Number of regular meals per day: 1, sometimes 2  Number of snacking episodes per day: 2  Who shops for food? patient  Who prepares food? patient  Binge behavior?: yes -    Purge behavior? no  Anorexic behavior? no  Eating precipitated by stress? yes -    Guilt feelings associated with eating? yes -       Other Potential Contributing Factors  Use of alcohol: average 0 drinks/week  Use of medications that may cause weight gain none  Comorbidities: dyslipidemias, hypertension, infertility and pre-DM, fatty liver on US     5 months ago was started on adipex. She reports good appetite control. Has decreased sugar intake > 75%. Aims for <100 total carbs per day. Eating smaller portions. Down additional 13 lbs for total of around >50 lbs since 11/2017.      She has comorbid  pre-DM. Improving BG due to weight loss.     Has HTN which has also been well controlled. Taking med as rx'd. Tolerating statin for HLP.     She has chronic pain due to FMSx. Having increased stiffness in hands with cold weather but otherwise doing well. Still struggles with muscle pain/spasm in neck, upper back, shoulders.    Taking vit D for def. Fatigue has improved.    She continues to smoke approx 3/4 pack of cigs per day. Does not feel she is able to quit smoking at this time.    The following portions of the patient's history were reviewed and updated as appropriate: allergies, current medications, past family history, past medical history, past social history, past surgical history and problem list.    Review of Systems   Constitutional: Positive for fatigue. Negative for chills, diaphoresis and fever.   HENT: Positive for congestion, postnasal drip and sinus pressure. Negative for ear pain, mouth sores, nosebleeds, rhinorrhea, sneezing, sore throat and trouble swallowing.    Eyes: Negative for redness and itching.   Respiratory: Negative for cough, shortness of breath and wheezing.    Cardiovascular: Negative for chest pain, palpitations and leg swelling.   Gastrointestinal: Negative for abdominal pain, diarrhea, nausea and vomiting.   Endocrine: Negative for polydipsia and polyuria.   Genitourinary: Negative for dysuria and hematuria.   Musculoskeletal: Positive for arthralgias, back pain and myalgias.   Skin: Negative for rash and wound.   Neurological: Positive for headaches. Negative for dizziness, tremors, syncope and weakness.   Hematological: Negative for adenopathy. Bruises/bleeds easily.   Psychiatric/Behavioral: Negative for dysphoric mood and sleep disturbance. The patient is not nervous/anxious.        Objective    Vitals:    11/12/18 0907   BP: 126/82   Pulse: 90   SpO2: 97%     Body mass index is 31.62 kg/m².      11/12/18 0907   Weight: 86.2 kg (190 lb)       Physical Exam   Constitutional:  She is oriented to person, place, and time. She appears well-developed and well-nourished. She is cooperative. She does not appear ill. No distress.   obese   HENT:   Head: Normocephalic and atraumatic.   Mouth/Throat: Oropharynx is clear and moist and mucous membranes are normal. Mucous membranes are not dry. No oral lesions.   Eyes: Conjunctivae and lids are normal. Right eye exhibits no nystagmus. Left eye exhibits no nystagmus.   Neck: Phonation normal. Neck supple. Normal carotid pulses present. No thyromegaly present.   Cardiovascular: Normal rate, regular rhythm, normal heart sounds and intact distal pulses. Exam reveals no gallop.   No murmur heard.  Pulmonary/Chest: Effort normal and breath sounds normal.   Musculoskeletal:   Multiple areas of soft tissue TTP     Vascular Status -  Her right foot exhibits no edema. Her left foot exhibits no edema.  Lymphadenopathy:     She has no cervical adenopathy.   Neurological: She is alert and oriented to person, place, and time. She displays no tremor. Gait normal.   Skin: Skin is warm and dry. No bruising and no rash noted.   Psychiatric: She has a normal mood and affect. Her behavior is normal. Cognition and memory are normal.   Good eye contact. Answers questions appropriately. Good personal hygiene and grooming.   Nursing note and vitals reviewed.    Lab Results   Component Value Date    WBC 11.2 (H) 11/27/2017    HGB 15.0 05/24/2018    HCT 45.0 05/24/2018    MCV 85 05/24/2018     05/24/2018     Lab Results   Component Value Date    GLUCOSE 96 11/09/2017    BUN 16 05/24/2018    CREATININE 0.55 (L) 05/24/2018    EGFRIFNONA 114 05/24/2018    EGFRIFAFRI 131 05/24/2018    BCR 29 (H) 05/24/2018    K 4.5 05/24/2018    CO2 26 05/24/2018    CALCIUM 10.9 (H) 05/24/2018    PROTENTOTREF 7.1 05/24/2018    ALBUMIN 4.7 05/24/2018    LABIL2 2.0 05/24/2018    AST 48 (H) 05/24/2018    ALT 65 (H) 05/24/2018       Lab Results   Component Value Date    HGBA1C 5.5 08/24/2018      Lab Results   Component Value Date    CHOL 228 (H) 07/18/2016    CHLPL 219 (H) 05/24/2018    TRIG 291 (H) 05/24/2018    HDL 45 05/24/2018     (H) 05/24/2018     Lab Results   Component Value Date    TSH 1.160 05/24/2018     Assessment/Plan   Madalyn was seen today for weight check.    Diagnoses and all orders for this visit:    Essential hypertension    Fibromyalgia  -     meloxicam (MOBIC) 15 MG tablet; Take 1 tablet by mouth Daily.    Other hyperlipidemia    Prediabetes    Vitamin D deficiency    Fatty liver    Class 1 obesity due to excess calories with serious comorbidity and body mass index (BMI) of 31.0 to 31.9 in adult  -     phentermine (ADIPEX-P) 37.5 MG tablet; 1/2 po bid or 1 po qd    Tobacco abuse       HTN controlled. HLP with good tolerance of statin.    Continue vit D replacement.    FMSx stable. Continue NSAID and muscle relaxant as needed.     Pre-DM with improvement in A1c now down to 5.5 from 5.9 in May. I have reiterated importance of increased daily physical activity as well as diabetic appropriate diet to prevent DM. She voices understanding.      Tobacco cessation advised.  Pt voiced understanding of health risks of cont'd tobacco use.      Chronic obesity with fatty liver, pre-DM. Good response to Adipiex as well as good efforts at lifestyle modification. Nutrition and activity goals reviewed including: mainly water to drink, limit white flour/processed sugar, high protein, high fiber carbs, good breakfast, working toward 150 mins cardio per week, weight training 2x/week  As part of patient's treatment plan I am prescribing a controlled substance. The patient has been made aware of the appropriate use of such medications, including potential risk of somnolence, limited ability to drive and/or work safely, and potential for dependence and/or overdose.  It has also been made clear that these medications are for use by this patient only, without concomitant use of alcohol or other  substances, unless prescribed.  MARICRUZ report reviewed and scanned into chart.  Last MARICRUZ date 9/26/18.     Routine f/u in 1-s months, sooner as needed.  Patient was encouraged to keep me informed of any acute changes, lack of improvement, or any new concerning symptoms.  Pt is aware of reasons to seek emergent care.  Patient voiced understanding of all instructions and denied further questions.

## 2019-01-15 ENCOUNTER — OFFICE VISIT (OUTPATIENT)
Dept: FAMILY MEDICINE CLINIC | Facility: CLINIC | Age: 44
End: 2019-01-15

## 2019-01-15 VITALS
BODY MASS INDEX: 29.99 KG/M2 | WEIGHT: 180 LBS | DIASTOLIC BLOOD PRESSURE: 80 MMHG | SYSTOLIC BLOOD PRESSURE: 112 MMHG | HEIGHT: 65 IN

## 2019-01-15 DIAGNOSIS — E66.09 CLASS 1 OBESITY DUE TO EXCESS CALORIES WITH SERIOUS COMORBIDITY AND BODY MASS INDEX (BMI) OF 31.0 TO 31.9 IN ADULT: ICD-10-CM

## 2019-01-15 DIAGNOSIS — E55.9 VITAMIN D DEFICIENCY: ICD-10-CM

## 2019-01-15 DIAGNOSIS — E78.2 MIXED HYPERLIPIDEMIA: ICD-10-CM

## 2019-01-15 DIAGNOSIS — J01.00 ACUTE NON-RECURRENT MAXILLARY SINUSITIS: ICD-10-CM

## 2019-01-15 DIAGNOSIS — R73.03 PREDIABETES: Primary | ICD-10-CM

## 2019-01-15 DIAGNOSIS — I10 ESSENTIAL HYPERTENSION: ICD-10-CM

## 2019-01-15 DIAGNOSIS — M79.7 FIBROMYALGIA: ICD-10-CM

## 2019-01-15 PROCEDURE — 99214 OFFICE O/P EST MOD 30 MIN: CPT | Performed by: FAMILY MEDICINE

## 2019-01-15 RX ORDER — MELOXICAM 15 MG/1
15 TABLET ORAL DAILY
Qty: 90 TABLET | Refills: 1 | Status: SHIPPED | OUTPATIENT
Start: 2019-01-15 | End: 2019-12-02 | Stop reason: SDUPTHER

## 2019-01-15 RX ORDER — AMOXICILLIN AND CLAVULANATE POTASSIUM 875; 125 MG/1; MG/1
1 TABLET, FILM COATED ORAL EVERY 12 HOURS SCHEDULED
Qty: 20 TABLET | Refills: 0 | Status: SHIPPED | OUTPATIENT
Start: 2019-01-15 | End: 2019-01-25

## 2019-01-15 NOTE — PROGRESS NOTES
Subjective   Madalyn Villegas is a 43 y.o. female.     History of Present Illness  Mrs. Villegas presents today for weight check/mngt of obesity.  Obesity: Patient complains of obesity. Patient cites health, increased physical ability as reasons for wanting to lose weight.  She is also working toward qualification for reduction mammoplasty due to chronic neck, upper back and chest wall pain.     Obesity History  Weight in late teens: 180s.  Lowest adult weight: 175 at age 28  Highest adult weight: 250  Amount of time at present weight: < 1 month  Goal weight of 170 lbs.     History of Weight Loss Efforts  Circumstances associated with regain of weight: stress, pain  Successful weight loss techniques attempted: none she has tried so far  Unsuccessful weight loss techniques attempted: nutritionist consultation, OTC appetite suppressants: self-directed dieting and very low calorie diet     Current Exercise Habits walking daily at work (works several hours, on feet walking during that entire time); has also joined gym, going faithfully at least 3 times per week, doing cardio twice per week and mixture of resistance training at least 1 day per week     Current Eating Habits  Number of regular meals per day: 1, sometimes 2  Number of snacking episodes per day: 2  Who shops for food? patient  Who prepares food? patient  Binge behavior?: yes -    Purge behavior? no  Anorexic behavior? no  Eating precipitated by stress? yes -    Guilt feelings associated with eating? yes -       Other Potential Contributing Factors  Use of alcohol: average 0 drinks/week  Use of medications that may cause weight gain none  Comorbidities: dyslipidemias, hypertension, infertility and pre-DM, fatty liver on US     7 months ago was started on adipex. Has not used any over past month She reports good appetite control. Has decreased sugar intake > 75%. Aims for <100 total carbs per day. Eating smaller portions. Down additional 10 lbs over past 2  months for total of around >60 lbs since 11/2017.      She has comorbid pre-DM. Improving BG due to weight loss.     Has HTN which has also been well controlled. Taking med as rx'd. Tolerating statin for HLP.     She has chronic pain due to FMSx. Having increased stiffness in hands with cold weather but otherwise doing well. Still struggles with muscle pain/spasm in neck, upper back, shoulders.     Taking vit D for def. Fatigue has improved.     She does c/o increased nasal congestion, facial pain/pressure, ear fullness, purulent nasal d/c in morning. Has been using OTC mucinex and decongestants with mild, temp improvement. Pressure behind eyes affecting vision. Symptoms persisting x 3 weeks.    The following portions of the patient's history were reviewed and updated as appropriate: allergies, current medications, past family history, past medical history, past social history, past surgical history and problem list.    Review of Systems   Constitutional: Positive for fatigue. Negative for chills, diaphoresis and fever.   HENT: Positive for congestion, postnasal drip, rhinorrhea, sinus pressure and sinus pain. Negative for ear pain, mouth sores, nosebleeds, sneezing, sore throat and trouble swallowing.    Eyes: Positive for pain. Negative for redness and itching.   Respiratory: Negative for cough, shortness of breath and wheezing.    Cardiovascular: Negative for chest pain, palpitations and leg swelling.   Gastrointestinal: Negative for abdominal pain, diarrhea, nausea and vomiting.   Endocrine: Negative for polydipsia and polyuria.   Genitourinary: Negative for dysuria and hematuria.   Musculoskeletal: Positive for arthralgias, back pain and myalgias.   Skin: Negative for rash and wound.   Neurological: Positive for headaches. Negative for dizziness, tremors, syncope and weakness.   Hematological: Negative for adenopathy. Bruises/bleeds easily.   Psychiatric/Behavioral: Negative for dysphoric mood and sleep  disturbance. The patient is not nervous/anxious.        Objective    Vitals:    01/15/19 0803   BP: 112/80     Body mass index is 29.95 kg/m².      01/15/19  0803   Weight: 81.6 kg (180 lb)       Physical Exam   Constitutional: She is oriented to person, place, and time. She appears well-developed and well-nourished. She is cooperative. She appears ill (mildly). No distress.   HENT:   Head: Normocephalic and atraumatic.   Right Ear: Tympanic membrane, external ear and ear canal normal.   Left Ear: Tympanic membrane, external ear and ear canal normal.   Nose: Mucosal edema and rhinorrhea (mucoid) present. Right sinus exhibits maxillary sinus tenderness. Left sinus exhibits maxillary sinus tenderness.   Mouth/Throat: Oropharynx is clear and moist. Mucous membranes are not dry. No oral lesions.   Eyes: Conjunctivae, EOM and lids are normal.   Cardiovascular: Normal rate, regular rhythm, normal heart sounds and intact distal pulses.   Pulmonary/Chest: Effort normal and breath sounds normal.   Musculoskeletal:        Thoracic back: She exhibits tenderness (diffuse soft tisssue), deformity (increased kyphosis) and spasm. She exhibits normal range of motion and no bony tenderness.     Vascular Status -  Her right foot exhibits no edema. Her left foot exhibits no edema.  Neurological: She is alert and oriented to person, place, and time. She displays no tremor. Gait normal.   Skin: Skin is warm and dry. No bruising and no rash noted.   Psychiatric: She has a normal mood and affect. Her behavior is normal. Cognition and memory are normal.   Good eye contact. Answers questions appropriately. Good personal hygiene and grooming.   Nursing note and vitals reviewed.    Lab Results   Component Value Date    WBC 12.7 (H) 05/24/2018    HGB 15.0 05/24/2018    HCT 45.0 05/24/2018    MCV 85 05/24/2018     05/24/2018     Lab Results   Component Value Date    GLUCOSE 96 11/09/2017    BUN 16 05/24/2018    CREATININE 0.55 (L)  05/24/2018    EGFRIFNONA 114 05/24/2018    EGFRIFAFRI 131 05/24/2018    BCR 29 (H) 05/24/2018    K 4.5 05/24/2018    CO2 26 05/24/2018    CALCIUM 10.9 (H) 05/24/2018    PROTENTOTREF 7.1 05/24/2018    ALBUMIN 4.7 05/24/2018    LABIL2 2.0 05/24/2018    AST 48 (H) 05/24/2018    ALT 65 (H) 05/24/2018     Lab Results   Component Value Date    TSH 1.160 05/24/2018     Lab Results   Component Value Date    HGBA1C 5.5 08/24/2018       Assessment/Plan   Madalyn was seen today for weight check and nasal congestion.    Diagnoses and all orders for this visit:    Prediabetes    Class 1 obesity due to excess calories with serious comorbidity and body mass index (BMI) of 31.0 to 31.9 in adult    Fibromyalgia  -     meloxicam (MOBIC) 15 MG tablet; Take 1 tablet by mouth Daily.    Acute non-recurrent maxillary sinusitis  -     amoxicillin-clavulanate (AUGMENTIN) 875-125 MG per tablet; Take 1 tablet by mouth Every 12 (Twelve) Hours for 10 days.    Essential hypertension    Mixed hyperlipidemia    Vitamin D deficiency      Symptomatic tx of acute sinusitis reviewed.    HTN controlled. HLP with good tolerance of statin.     Continue vit D replacement.     FMSx stable. Continue NSAID and muscle relaxant as needed.     Pre-DM with improvement in A1c now down to 5.5 from 5.9 in May. I have reiterated importance of increased daily physical activity as well as diabetic appropriate diet to prevent DM. She voices understanding.       Tobacco cessation advised.  Pt voiced understanding of health risks of cont'd tobacco use.      Chronic obesity with fatty liver, pre-DM. Good response to Adipiex as well as good efforts at lifestyle modification. Nutrition and activity goals reviewed including: mainly water to drink, limit white flour/processed sugar, high protein, high fiber carbs, good breakfast, working toward 150 mins cardio per week, weight training 2x/week.    She will also continue yoga and stretching for assistance with back  pain.     Routine f/u in 1-2 months, sooner as needed.  Goal weight of 170 lbs. If she is able to maintain for 3 months, will refer back to surgeon for eval for possible reduction mammoplasty.    Patient was encouraged to keep me informed of any acute changes, lack of improvement, or any new concerning symptoms.  Pt is aware of reasons to seek emergent care.  Patient voiced understanding of all instructions and denied further questions.

## 2019-03-15 ENCOUNTER — OFFICE VISIT (OUTPATIENT)
Dept: FAMILY MEDICINE CLINIC | Facility: CLINIC | Age: 44
End: 2019-03-15

## 2019-03-15 VITALS
SYSTOLIC BLOOD PRESSURE: 128 MMHG | DIASTOLIC BLOOD PRESSURE: 76 MMHG | HEIGHT: 63 IN | WEIGHT: 182 LBS | BODY MASS INDEX: 32.25 KG/M2 | OXYGEN SATURATION: 98 % | HEART RATE: 97 BPM

## 2019-03-15 DIAGNOSIS — R73.03 PREDIABETES: Primary | ICD-10-CM

## 2019-03-15 DIAGNOSIS — K76.0 FATTY LIVER: ICD-10-CM

## 2019-03-15 DIAGNOSIS — E78.49 OTHER HYPERLIPIDEMIA: ICD-10-CM

## 2019-03-15 DIAGNOSIS — E83.52 HYPERCALCEMIA: ICD-10-CM

## 2019-03-15 DIAGNOSIS — E55.9 VITAMIN D DEFICIENCY: ICD-10-CM

## 2019-03-15 DIAGNOSIS — M79.7 FIBROMYALGIA: ICD-10-CM

## 2019-03-15 DIAGNOSIS — I10 ESSENTIAL HYPERTENSION: ICD-10-CM

## 2019-03-15 DIAGNOSIS — E66.09 CLASS 1 OBESITY DUE TO EXCESS CALORIES WITH SERIOUS COMORBIDITY AND BODY MASS INDEX (BMI) OF 32.0 TO 32.9 IN ADULT: ICD-10-CM

## 2019-03-15 LAB
ALBUMIN SERPL-MCNC: 4.2 G/DL (ref 3.5–5)
ALBUMIN/GLOB SERPL: 1.7 G/DL (ref 1–2)
ALP SERPL-CCNC: 61 U/L (ref 38–126)
ALT SERPL-CCNC: 29 U/L (ref 13–69)
AST SERPL-CCNC: 25 U/L (ref 15–46)
BILIRUB SERPL-MCNC: 0.5 MG/DL (ref 0.2–1.3)
BUN SERPL-MCNC: 17 MG/DL (ref 7–20)
BUN/CREAT SERPL: 28.3 (ref 7.1–23.5)
CALCIUM SERPL-MCNC: 10.9 MG/DL (ref 8.4–10.2)
CHLORIDE SERPL-SCNC: 103 MMOL/L (ref 98–107)
CO2 SERPL-SCNC: 30 MMOL/L (ref 26–30)
CREAT SERPL-MCNC: 0.6 MG/DL (ref 0.6–1.3)
EXPIRATION DATE: ABNORMAL
GLOBULIN SER CALC-MCNC: 2.5 GM/DL
GLUCOSE SERPL-MCNC: 99 MG/DL (ref 74–98)
HBA1C MFR BLD: 5.1 %
Lab: ABNORMAL
POTASSIUM SERPL-SCNC: 4 MMOL/L (ref 3.5–5.1)
PROT SERPL-MCNC: 6.7 G/DL (ref 6.3–8.2)
SODIUM SERPL-SCNC: 141 MMOL/L (ref 137–145)

## 2019-03-15 PROCEDURE — 99214 OFFICE O/P EST MOD 30 MIN: CPT | Performed by: FAMILY MEDICINE

## 2019-03-15 PROCEDURE — 83036 HEMOGLOBIN GLYCOSYLATED A1C: CPT | Performed by: FAMILY MEDICINE

## 2019-03-15 RX ORDER — ATORVASTATIN CALCIUM 10 MG/1
10 TABLET, FILM COATED ORAL NIGHTLY
Qty: 90 TABLET | Refills: 1 | Status: SHIPPED | OUTPATIENT
Start: 2019-03-15 | End: 2019-08-19 | Stop reason: SDUPTHER

## 2019-03-15 RX ORDER — CHLORTHALIDONE 25 MG/1
25 TABLET ORAL DAILY
Qty: 90 TABLET | Refills: 1 | Status: SHIPPED | OUTPATIENT
Start: 2019-03-15 | End: 2019-08-19 | Stop reason: SDUPTHER

## 2019-03-15 RX ORDER — TIZANIDINE HYDROCHLORIDE 6 MG/1
6 CAPSULE, GELATIN COATED ORAL 3 TIMES DAILY PRN
Qty: 270 CAPSULE | Refills: 1 | Status: SHIPPED | OUTPATIENT
Start: 2019-03-15 | End: 2019-12-16 | Stop reason: SDUPTHER

## 2019-03-15 NOTE — PROGRESS NOTES
"Subjective   Madalyn Villegas is a 43 y.o. female.     History of Present Illness  Mrs. Villegas presents today for weight check/mngt of obesity, and f/u on chronic medical problems.  Obesity: Patient complains of obesity. Patient cites health, increased physical ability as reasons for wanting to lose weight.  She is also working toward qualification for reduction mammoplasty due to chronic neck, upper back and chest wall pain.     Obesity History  Weight in late teens: 180s.  Lowest adult weight: 175 at age 28  Highest adult weight: 250  Amount of time at present weight: < 1 month  Goal weight of 170 lbs.     History of Weight Loss Efforts  Circumstances associated with regain of weight: stress, pain, holidays, change in schedule  Successful weight loss techniques attempted: medical mgnt, decreased carb intake  Unsuccessful weight loss techniques attempted: nutritionist consultation, OTC appetite suppressants: self-directed dieting and very low calorie diet     Current Exercise Habits walking daily at work (works several hours, on feet walking during that entire time); has also joined gym, going faithfully at least 3 times per week, doing cardio twice per week and mixture of resistance training at least 1-2 days per week     Current Eating Habits  Number of regular meals per day: 1, sometimes 2  Number of snacking episodes per day: 2  Who shops for food? patient  Who prepares food? patient  Binge behavior?: yes -    Purge behavior? no  Anorexic behavior? no  Eating precipitated by stress? yes -    Guilt feelings associated with eating? yes -       Other Potential Contributing Factors  Use of alcohol: average 0 drinks/week  Use of medications that may cause weight gain none  Comorbidities: dyslipidemias, hypertension, infertility and pre-DM, fatty liver on US     9 months ago was started on adipex. She reports good appetite control. Has struggled with healthy choices over past several weeks but is \"back on board\". Has " decreased sugar intake > 75%. Aims for <100 total carbs per day. Eating smaller portions. Up 2 lbs today from previous weight. Reports she actually had gained more but is working to lose and almost back to weight in Jan. She is down about 60 lbs since 11/2017.      She has comorbid pre-DM. Improving BG due to weight loss.     Has HTN which has also been well controlled on thiazide diuretic. Taking med as rx'd. Tolerating statin for HLP. Following fairly heart healthy diet.     She has chronic pain due to FMSx. Still struggles with muscle pain/spasm in neck, upper back, shoulders but feels exercise (low impact) helps. Using tizanidine as needed.     Taking vit D for def. Fatigue has improved.    Noted to have mild but persistent hypercalcemia on previous labs. Has stopped calcium supplement.    The following portions of the patient's history were reviewed and updated as appropriate: allergies, current medications, past family history, past medical history, past social history, past surgical history and problem list.    Review of Systems   Constitutional: Positive for fatigue. Negative for diaphoresis and fever.   HENT: Positive for congestion and postnasal drip. Negative for ear pain, mouth sores, nosebleeds, rhinorrhea, sneezing, sore throat and trouble swallowing.    Respiratory: Negative for cough, shortness of breath and wheezing.    Cardiovascular: Negative for chest pain, palpitations and leg swelling.   Gastrointestinal: Negative for abdominal pain, diarrhea, nausea and vomiting.   Endocrine: Negative for polydipsia and polyuria.   Genitourinary: Negative for dysuria and hematuria.   Musculoskeletal: Positive for arthralgias, back pain and myalgias.   Skin: Negative for rash and wound.   Neurological: Positive for headaches. Negative for dizziness, tremors, syncope and weakness.   Hematological: Negative for adenopathy. Bruises/bleeds easily.   Psychiatric/Behavioral: Negative for behavioral problems,  dysphoric mood and sleep disturbance. The patient is not nervous/anxious.        Objective    Vitals:    03/15/19 0821   BP: 128/76   Pulse: 97   SpO2: 98%     Body mass index is 32.24 kg/m².      03/15/19  0821   Weight: 82.6 kg (182 lb)       Physical Exam   Constitutional: She is oriented to person, place, and time. She appears well-developed and well-nourished. She is cooperative. She does not appear ill. No distress.   endomorphic   HENT:   Head: Normocephalic and atraumatic.   Mouth/Throat: Mucous membranes are normal. Mucous membranes are not dry.   Eyes: Conjunctivae and lids are normal.   Neck: Phonation normal. Neck supple. Normal carotid pulses present. No thyromegaly present.   Cardiovascular: Normal rate, regular rhythm, normal heart sounds and intact distal pulses. Exam reveals no gallop.   No murmur heard.  Pulmonary/Chest: Effort normal and breath sounds normal.     Vascular Status -  Her right foot exhibits no edema. Her left foot exhibits no edema.  Lymphadenopathy:     She has no cervical adenopathy.   Neurological: She is alert and oriented to person, place, and time. She displays no tremor. Gait normal.   Skin: Skin is warm and dry. No bruising and no rash noted.   Psychiatric: She has a normal mood and affect. Her behavior is normal. Cognition and memory are normal.   Good eye contact. Answers questions appropriately. Good personal hygiene and grooming.   Nursing note and vitals reviewed.      Lab Results   Component Value Date    HGBA1C 5.1 03/15/2019     Assessment/Plan   Madalyn was seen today for fibromyalgia, hyperlipidemia, hypertension and prediabetes.    Diagnoses and all orders for this visit:    Prediabetes  -     POC Glycated Hemoglobin, Total  -     Comprehensive Metabolic Panel    Other hyperlipidemia  -     atorvastatin (LIPITOR) 10 MG tablet; Take 1 tablet by mouth Every Night.    Essential hypertension  -     chlorthalidone (HYGROTON) 25 MG tablet; Take 1 tablet by mouth  Daily.  -     Comprehensive Metabolic Panel    Fibromyalgia    Fatty liver    Hypercalcemia  -     Comprehensive Metabolic Panel    Vitamin D deficiency    Class 1 obesity due to excess calories with serious comorbidity and body mass index (BMI) of 32.0 to 32.9 in adult    Other orders  -     TiZANidine (ZANAFLEX) 6 MG capsule; Take 1 capsule by mouth 3 (Three) Times a Day As Needed for Muscle Spasms.       HTN controlled. continue HCTZ. HLP with good tolerance of statin.     Continue vit D replacement.     FMSx stable. Continue NSAID and muscle relaxant as needed.     Pre-DM with improvement in A1c now down to 5.1 from highest at 5.9. Continue weight loss efforts, steady increase in physical activity.     Tobacco cessation advised.  Pt voiced understanding of health risks of cont'd tobacco use.      Chronic obesity with fatty liver, pre-DM. Good response to Adipiex as well as good efforts at lifestyle modification. Nutrition and activity goals reviewed including: mainly water to drink, limit white flour/processed sugar, high protein, high fiber carbs, good breakfast, working toward 150 mins cardio per week, weight training 2x/week.    Routine f/u in months, sooner as needed/instructed.  I will contact patient regarding test results and provide instructions regarding any necessary changes in plan of care.    Goal weight of 170 lbs. If she is able to maintain for 3 months, we will refer back to surgeon for eval for possible reduction mammoplasty.     Patient was encouraged to keep me informed of any acute changes, or any new concerning symptoms.  Pt is aware of reasons to seek emergent care.  Patient voiced understanding of all instructions and denied further questions.   As part of patient's treatment plan I am prescribing a controlled substance.  The patient has been made aware of the appropriate use of such medications, including potential risk of somnolence, limited ability to drive and/or work safely, and  potential for dependence and/or overdose.  It has also been made clear that these medications are for use by this patient only, without concomitant use of alcohol or other substances, unless prescribed.  MARICRUZ report reviewed and scanned into chart.  Last MARICRUZ date 3/15/19.

## 2019-03-17 PROBLEM — R10.31 RIGHT LOWER QUADRANT ABDOMINAL PAIN: Status: RESOLVED | Noted: 2017-07-31 | Resolved: 2019-03-17

## 2019-03-18 DIAGNOSIS — E83.52 HYPERCALCEMIA: Primary | ICD-10-CM

## 2019-03-19 ENCOUNTER — RESULTS ENCOUNTER (OUTPATIENT)
Dept: FAMILY MEDICINE CLINIC | Facility: CLINIC | Age: 44
End: 2019-03-19

## 2019-03-19 DIAGNOSIS — E83.52 HYPERCALCEMIA: ICD-10-CM

## 2019-04-02 NOTE — DISCHARGE INSTRUCTIONS
No pushing, pulling, tugging,  heavy lifting, or strenuous activity.  No major decision making, driving, or drinking alcoholic beverages for 24 hours. ( due to the medications you have  received)  Always use good hand hygiene/washing techniques.  NO driving while taking pain medications.To assist you in voiding:  Drink plenty of fluids  Listen to running water while attempting to void.    If you are unable to urinate and you have an uncomfortable urge to void or it has been   6 hours since you were discharged, return to the Emergency Room    Pelvic rest is best described as not putting anything in your vagina. This includes tampons, douching, tub bathing or sexual activity.    
Yes

## 2019-05-16 ENCOUNTER — OFFICE VISIT (OUTPATIENT)
Dept: FAMILY MEDICINE CLINIC | Facility: CLINIC | Age: 44
End: 2019-05-16

## 2019-05-16 VITALS
BODY MASS INDEX: 36.14 KG/M2 | HEART RATE: 70 BPM | DIASTOLIC BLOOD PRESSURE: 72 MMHG | HEIGHT: 63 IN | SYSTOLIC BLOOD PRESSURE: 112 MMHG | WEIGHT: 204 LBS | OXYGEN SATURATION: 97 %

## 2019-05-16 DIAGNOSIS — E78.2 MIXED HYPERLIPIDEMIA: ICD-10-CM

## 2019-05-16 DIAGNOSIS — E66.01 CLASS 2 SEVERE OBESITY DUE TO EXCESS CALORIES WITH SERIOUS COMORBIDITY AND BODY MASS INDEX (BMI) OF 36.0 TO 36.9 IN ADULT (HCC): ICD-10-CM

## 2019-05-16 DIAGNOSIS — M79.7 FIBROMYALGIA: ICD-10-CM

## 2019-05-16 DIAGNOSIS — I10 ESSENTIAL HYPERTENSION: Primary | ICD-10-CM

## 2019-05-16 DIAGNOSIS — R73.03 PREDIABETES: ICD-10-CM

## 2019-05-16 PROCEDURE — 99214 OFFICE O/P EST MOD 30 MIN: CPT | Performed by: FAMILY MEDICINE

## 2019-05-16 RX ORDER — PHENTERMINE HYDROCHLORIDE 37.5 MG/1
37.5 TABLET ORAL
Qty: 30 TABLET | Refills: 0 | Status: SHIPPED | OUTPATIENT
Start: 2019-05-16 | End: 2019-06-17 | Stop reason: SDUPTHER

## 2019-05-16 NOTE — PROGRESS NOTES
"Subjective   Madalyn Villegas is a 43 y.o. female.     History of Present Illness  Mrs. Villegas presents today for routine follow-up on several chronic medical problems.    She has hypertension for which she is currently on chlorthalidone.  Taking as prescribed.  Denies side effects.  Blood pressures been well controlled.  She follows a fairly heart healthy diet.  Getting a regular exercise.    She has mixed hyperlipidemia for which she is currently on statin.  Taking as prescribed.  Denies side effects.    Has chronic obesity in association with prediabetes.  Has markedly decreased sugar intake.  Very successful in weight loss efforts until recent weeks when she quit smoking.    She has chronic pain due to fibromyalgia.  Currently on Mobic, muscle relaxant.    She is proud to report that she has been without cigarettes x2 months.  She has had significant weight gain during that time of 15 pounds.  For that reason she would like to reinitiate Adipex which has worked well for her in the past.  She does note that since quitting smoking she has been \"very sleepy\" often needs a nap during the day.    The following portions of the patient's history were reviewed and updated as appropriate: allergies, current medications, past family history, past medical history, past social history, past surgical history and problem list.    Review of Systems   Constitutional: Positive for fatigue and unexpected weight change. Negative for diaphoresis and fever.   HENT: Positive for congestion and postnasal drip. Negative for rhinorrhea, sore throat and trouble swallowing.    Eyes: Negative for visual disturbance.   Respiratory: Negative for cough, shortness of breath and wheezing.    Cardiovascular: Negative for chest pain, palpitations and leg swelling.   Gastrointestinal: Positive for abdominal pain (with diarrhea) and diarrhea. Negative for nausea and vomiting.   Endocrine: Positive for heat intolerance. Negative for polydipsia and " polyuria.   Genitourinary: Negative for dysuria and hematuria.   Musculoskeletal: Positive for arthralgias, back pain and myalgias.   Skin: Negative for rash and wound.   Neurological: Positive for headaches. Negative for dizziness, tremors and weakness.   Hematological: Negative for adenopathy. Bruises/bleeds easily.   Psychiatric/Behavioral: Negative for dysphoric mood and sleep disturbance. The patient is not nervous/anxious.        Objective    Vitals:    05/16/19 0811   BP: 112/72   Pulse: 70   SpO2: 97%     Body mass index is 36.14 kg/m².      05/16/19  0811   Weight: 92.5 kg (204 lb)       Physical Exam   Constitutional: She is oriented to person, place, and time. She appears well-developed and well-nourished. She is cooperative. She does not appear ill. No distress.   endomorphic   HENT:   Head: Normocephalic and atraumatic.   Mouth/Throat: Oropharynx is clear and moist and mucous membranes are normal. Mucous membranes are not dry. No oral lesions.   Mallampati class 3   Eyes: Conjunctivae and lids are normal.   Cardiovascular: Normal rate, regular rhythm, normal heart sounds and intact distal pulses. Exam reveals no gallop.   No murmur heard.  Pulmonary/Chest: Effort normal and breath sounds normal.   Abdominal: Soft. Bowel sounds are normal. She exhibits no distension and no mass. There is no tenderness.     Vascular Status -  Her right foot exhibits no edema. Her left foot exhibits no edema.  Neurological: She is alert and oriented to person, place, and time. She displays no tremor. Gait normal.   Skin: Skin is warm and dry. No bruising and no rash noted.   Psychiatric: She has a normal mood and affect. Her speech is normal and behavior is normal. Judgment and thought content normal. Cognition and memory are normal.   Good eye contact. Answers questions appropriately. Good personal hygiene and grooming.   Nursing note and vitals reviewed.    Lab Results   Component Value Date    WBC 12.7 (H) 05/24/2018     HGB 15.0 05/24/2018    HCT 45.0 05/24/2018    MCV 85 05/24/2018     05/24/2018       Lab Results   Component Value Date    GLUCOSE 96 11/09/2017    BUN 17 03/15/2019    CREATININE 0.60 03/15/2019    EGFRIFNONA 108 03/15/2019    EGFRIFAFRI 131 03/15/2019    BCR 28.3 (H) 03/15/2019    K 4.0 03/15/2019    CO2 30.0 03/15/2019    CALCIUM 10.9 (H) 03/15/2019    PROTENTOTREF 6.7 03/15/2019    ALBUMIN 4.20 03/15/2019    LABIL2 1.7 03/15/2019    AST 25 03/15/2019    ALT 29 03/15/2019       Lab Results   Component Value Date    CHOL 228 (H) 07/18/2016    CHLPL 219 (H) 05/24/2018    TRIG 291 (H) 05/24/2018    HDL 45 05/24/2018     (H) 05/24/2018       Lab Results   Component Value Date    HGBA1C 5.1 03/15/2019       Lab Results   Component Value Date    TSH 1.160 05/24/2018     Assessment/Plan   Madalyn was seen today for fatigue, hyperlipidemia, hypertension, sleepiness and diarrhea.    Diagnoses and all orders for this visit:    Essential hypertension    Mixed hyperlipidemia    Prediabetes    Fibromyalgia    Class 2 severe obesity due to excess calories with serious comorbidity and body mass index (BMI) of 36.0 to 36.9 in adult (CMS/[facility])    Other orders  -     phentermine (ADIPEX-P) 37.5 MG tablet; Take 1 tablet by mouth Every Morning Before Breakfast.       Hypertension controlled.  Continue HCTZ, heart healthy diet and regular exercise.    Mixed hyperlipidemia not at goal.  Continue statin, lifestyle modifications as above.    Prediabetes stable.  Continue close monitoring.    5 myalgia stable.  Continue muscle relaxant and NSAID as needed.  Is on PPI for GI protection in setting of chronic NSAID use.    Chronic obesity with excellent success in lifestyle modification, weight loss efforts until the last several weeks in which she has had weight gain associated with smoking cessation, increased appetite subsequently.  Once again reviewed risk/benefits of visual side effects of Adipex.  He voiced understanding  wished to proceed with treatment as she is doing quite well on this in the past.  She has plan in place for monitoring caloric intake, increasing physical activity.    Routine follow-up in 1 month, sooner as needed.  Patient was encouraged to keep me informed of any acute changes, lack of improvement, or any new concerning symptoms.  Pt is aware of reasons to seek emergent care.  Patient voiced understanding of all instructions and denied further questions.  As part of patient's treatment plan I am prescribing a controlled substance.  The patient has been made aware of the appropriate use of such medications, including potential risk of somnolence, limited ability to drive and/or work safely, and potential for dependence and/or overdose.  It has also been made clear that these medications are for use by this patient only, without concomitant use of alcohol or other substances, unless prescribed.  MARICRUZ report requested.        Please note that portions of this note may have been completed with a voice recognition program. Efforts were made to edit the dictations, but occasionally words are mistranscribed.

## 2019-05-29 ENCOUNTER — OFFICE VISIT (OUTPATIENT)
Dept: RETAIL CLINIC | Facility: CLINIC | Age: 44
End: 2019-05-29

## 2019-05-29 VITALS
HEART RATE: 91 BPM | TEMPERATURE: 97.4 F | SYSTOLIC BLOOD PRESSURE: 132 MMHG | BODY MASS INDEX: 36.21 KG/M2 | DIASTOLIC BLOOD PRESSURE: 76 MMHG | WEIGHT: 204.4 LBS | OXYGEN SATURATION: 98 %

## 2019-05-29 DIAGNOSIS — L60.0 INGROWN LEFT GREATER TOENAIL: Primary | ICD-10-CM

## 2019-05-29 PROCEDURE — 99213 OFFICE O/P EST LOW 20 MIN: CPT | Performed by: NURSE PRACTITIONER

## 2019-05-29 RX ORDER — CHOLECALCIFEROL (VITAMIN D3) 125 MCG
CAPSULE ORAL
COMMUNITY

## 2019-05-29 RX ORDER — CEPHALEXIN 250 MG/1
250 CAPSULE ORAL 4 TIMES DAILY
Qty: 40 CAPSULE | Refills: 0 | Status: SHIPPED | OUTPATIENT
Start: 2019-05-29 | End: 2019-06-08

## 2019-05-29 NOTE — PROGRESS NOTES
Toe Pain      Subjective   Madalyn Villegas is a 44 y.o. female.     Toe Pain    The incident occurred more than 1 week ago. Incident location: pedicure. Injury mechanism: improper clipping. Pain location: left great toe. The quality of the pain is described as aching (throbbing). The pain is at a severity of 4/10. The pain is moderate. The pain has been worsening since onset. Pertinent negatives include no inability to bear weight, loss of motion, loss of sensation, muscle weakness, numbness or tingling. Associated symptoms comments: Pain with weight bearing. She reports no foreign bodies present. The symptoms are aggravated by movement, weight bearing and palpation. Treatments tried: ibuprofen, soaking, iodine, antibacterial cream. The treatment provided no relief.    History of ingrown toenails.    Patient Active Problem List   Diagnosis   • Piriformis syndrome of right side   • Essential hypertension   • Class 2 severe obesity due to excess calories with serious comorbidity and body mass index (BMI) of 36.0 to 36.9 in adult (CMS/[facility])   • Vitamin D deficiency   • Hyperlipidemia   • Abnormal echocardiogram   • Lumbar degenerative disc disease   • Arthritis of lumbar spine   • Fibromyalgia   • Prediabetes   • Rectal bleed   • Dysmenorrhea   • Menorrhagia with regular cycle   • Fatty liver   • Gallbladder polyp       No Known Allergies     Current Outpatient Medications on File Prior to Visit   Medication Sig Dispense Refill   • acetaminophen (TYLENOL) 500 MG tablet Take 500 mg by mouth every 6 (six) hours as needed for mild pain (1-3).     • albuterol (PROVENTIL HFA;VENTOLIN HFA) 108 (90 BASE) MCG/ACT inhaler Inhale 2 puffs every 4 (four) hours as needed for wheezing. 18 g 0   • atorvastatin (LIPITOR) 10 MG tablet Take 1 tablet by mouth Every Night. 90 tablet 1   • chlorthalidone (HYGROTON) 25 MG tablet Take 1 tablet by mouth Daily. 90 tablet 1   • Cholecalciferol (VITAMIN D3) 2000 units tablet Take  by mouth.      • DYMISTA 137-50 MCG/ACT suspension      • esomeprazole (NexIUM) 20 MG capsule Take 20 mg by mouth every morning before breakfast.     • glucosamine-chondroitin 500-400 MG capsule capsule Take 1 capsule by mouth Daily.     • ibuprofen (ADVIL,MOTRIN) 600 MG tablet ibuprofen 600 mg tablet     • meloxicam (MOBIC) 15 MG tablet Take 1 tablet by mouth Daily. 90 tablet 1   • methylcellulose, Laxative, (CITRUCEL) 500 MG tablet tablet Take 2 tablets by mouth Every 4 (Four) Hours As Needed.     • montelukast (SINGULAIR) 10 MG tablet Take 10 mg by mouth Daily.     • phentermine (ADIPEX-P) 37.5 MG tablet Take 1 tablet by mouth Every Morning Before Breakfast. 30 tablet 0   • Probiotic Product (PROBIOTIC PO) Take  by mouth.     • TiZANidine (ZANAFLEX) 6 MG capsule Take 1 capsule by mouth 3 (Three) Times a Day As Needed for Muscle Spasms. 270 capsule 1   • EPINEPHrine (EPIPEN) 0.3 MG/0.3ML solution auto-injector injection      • pseudoephedrine (SUDAFED) 30 MG tablet Take 30 mg by mouth Every 4 (Four) Hours As Needed for Congestion ([employer] BRAND OF SUDAFED).     • [DISCONTINUED] cholecalciferol (VITAMIN D3) 27491 UNITS capsule Take 1 capsule by mouth Daily. (Patient taking differently: Take 2,000 Units by mouth Daily.) 30 capsule 2   • [DISCONTINUED] NON FORMULARY Tumeric       No current facility-administered medications on file prior to visit.        Past Medical History:   Diagnosis Date   • Abnormal EKG    • Asthma     ASTHMA LIKE SYMPTOMS WITH SINUS INFECTION   • GERD (gastroesophageal reflux disease)    • History of being tatooed     RIGHT ANKLE   • History of Papanicolaou smear of cervix 09/2017    L       • Hyperlipidemia    • Hypertension    • Kidney stones    • Lumbar degenerative disc disease    • Migraine    • Tobacco abuse        Past Surgical History:   Procedure Laterality Date   • COLONOSCOPY N/A 8/24/2017    Procedure: COLONOSCOPY;  Surgeon: Blas Thapa MD;  Location: [facility] ENDOSCOPY;  Service:     • D&C HYSTEROSCOPY ENDOMETRIAL ABLATION N/A 2017    Procedure: DILATATION AND CURETTAGE HYSTEROSCOPY NOVASURE ENDOMETRIAL ABLATION;  Surgeon: Marin Louie MD;  Location: [facility];  Service:    • KNEE ACL RECONSTRUCTION Right 2010   • WISDOM TOOTH EXTRACTION         Family History   Problem Relation Age of Onset   • Arthritis Mother    • Hyperlipidemia Mother    • Hypertension Mother    • Obesity Mother    • Heart attack Mother 64   • Hypertension Father    • Heart disease Father    • Heart attack Sister 34   • Hyperlipidemia Sister    • Diabetes Sister    • Hypertension Sister    • Obesity Sister    • Heart disease Sister    • Heart failure Sister    • Obesity Sister    • Diabetes Sister    • Diabetes type II Sister    • Stomach cancer Maternal Uncle    • Cancer Maternal Grandfather        Social History     Socioeconomic History   • Marital status: Significant Other     Spouse name: Not on file   • Number of children: Not on file   • Years of education: Not on file   • Highest education level: Not on file   Tobacco Use   • Smoking status: Former Smoker     Packs/day: 0.75     Years: 25.00     Pack years: 18.75     Types: Cigarettes     Last attempt to quit: 3/16/2019     Years since quittin.2   • Smokeless tobacco: Never Used   Substance and Sexual Activity   • Alcohol use: No   • Drug use: No   • Sexual activity: Yes     Partners: Male     Birth control/protection: Surgical       Travel:  No recent travel within the last 21 days outside the [place] Denies recent travel to one of the following [place]ries:  [place], [place], [place], or [place].  Denies contact with anyone who has traveled to one of the following [place]  Countries: [place], [place], [place], or [place] within the last 21 days and is known or suspected to have Ebola.  Denies having had any contact with the human remains, blood or any bodily fluids of someone who is known or suspected to have Ebola within  the last 21 days.     OB History      Para Term  AB Living    0 0 0 0 0 0    SAB TAB Ectopic Molar Multiple Live Births    0 0 0 0 0 0          Review of Systems   Constitutional: Negative.  Negative for activity change.   HENT: Negative.    Respiratory: Negative.    Cardiovascular: Negative.    Gastrointestinal: Negative.    Musculoskeletal: Negative for myalgias.   Skin: Positive for wound (left great toe).   Neurological: Negative.  Negative for tingling and numbness.       /76 (BP Location: Right arm)   Pulse 91   Temp 97.4 °F (36.3 °C)   Wt 92.7 kg (204 lb 6.4 oz)   SpO2 98%   BMI 36.21 kg/m²     Objective   Physical Exam   Constitutional: She is oriented to person, place, and time. She appears well-developed and well-nourished. She is cooperative. She does not appear ill. No distress.   HENT:   Head: Normocephalic.   Cardiovascular: Normal rate, regular rhythm and normal heart sounds.   Pulmonary/Chest: Effort normal and breath sounds normal.       Neurological Sensory Findings - Unaltered hot/cold left ankle/foot discrimination. Unaltered sharp/dull left ankle/foot discrimination. No left ankle/foot altered proprioception  Vascular Status -  Her left foot exhibits normal foot vasculature  and no edema.  Skin Integrity  - Her left foot skin is intact..     Neurological: She is alert and oriented to person, place, and time.   Skin: Skin is warm and dry.       Assessment/Plan   Madalyn was seen today for toe pain.    Diagnoses and all orders for this visit:    Ingrown left greater toenail  -     cephalexin (KEFLEX) 250 MG capsule; Take 1 capsule by mouth 4 (Four) Times a Day for 10 days.    Take medication as prescribed. Appointment with podiatry recommended to remove toenail. Avoid tight fitting shoes and continue to soak foot and take Ibuprofen as needed to relieve pain. Wash up to three times a day with warm water and antibacterial soap. Avoid clipping the nail short and cut in a  straight line. If symptoms worsen before podiatry appt see PCP or ER.   Podiatry appt. made for Dr. Raúl Blanco 6/3/19 3:30 pm       Return podiatrist as scheduled.

## 2019-05-29 NOTE — PATIENT INSTRUCTIONS
Ingrown Toenail  An ingrown toenail occurs when the corner or sides of a toenail grow into the surrounding skin. This causes discomfort and pain. The big toe is most commonly affected, but any of the toes can be affected. If an ingrown toenail is not treated, it can become infected.  What are the causes?  This condition may be caused by:  · Wearing shoes that are too small or tight.  · An injury, such as stubbing your toe or having your toe stepped on.  · Improper cutting or care of your toenails.  · Having nail or foot abnormalities that were present from birth (congenital abnormalities), such as having a nail that is too big for your toe.    What increases the risk?  The following factors may make you more likely to develop ingrown toenails:  · Age. Nails tend to get thicker with age, so ingrown nails are more common among older people.  · Cutting your toenails incorrectly, such as cutting them very short or cutting them unevenly.    An ingrown toenail is more likely to get infected if you have:  · Diabetes.  · Blood flow (circulation) problems.    What are the signs or symptoms?  Symptoms of an ingrown toenail may include:  · Pain, soreness, or tenderness.  · Redness.  · Swelling.  · Hardening of the skin that surrounds the toenail.    Signs that an ingrown toenail may be infected include:  · Fluid or pus.  · Symptoms that get worse instead of better.    How is this diagnosed?  An ingrown toenail may be diagnosed based on your medical history, your symptoms, and a physical exam. If you have fluid or blood coming from your toenail, a sample may be collected to test for the specific type of bacteria that is causing the infection.  How is this treated?  Treatment depends on how severe your ingrown toenail is. You may be able to care for your toenail at home.  · If you have an infection, you may be prescribed antibiotic medicines.  · If you have fluid or pus draining from your toenail, your health care provider may  drain it.  · If you have trouble walking, you may be given crutches to use.  · If you have a severe or infected ingrown toenail, you may need a procedure to remove part or all of the nail.    Follow these instructions at home:  Foot care  · Do not pick at your toenail or try to remove it yourself.  · Soak your foot in warm, soapy water. Do this for 20 minutes, 3 times a day, or as often as told by your health care provider. This helps to keep your toe clean and keep your skin soft.  · Wear shoes that fit well and are not too tight. Your health care provider may recommend that you wear open-toed shoes while you heal.  · Trim your toenails regularly and carefully. Cut your toenails straight across to prevent injury to the skin at the corners of the toenail. Do not cut your nails in a curved shape.  · Keep your feet clean and dry to help prevent infection.  Medicines  · Take over-the-counter and prescription medicines only as told by your health care provider.  · If you were prescribed an antibiotic, take it as told by your health care provider. Do not stop taking the antibiotic even if you start to feel better.  Activity  · Return to your normal activities as told by your health care provider. Ask your health care provider what activities are safe for you.  · Avoid activities that cause pain.  General instructions  · If your health care provider told you to use crutches to help you move around, use them as instructed.  · Keep all follow-up visits as told by your health care provider. This is important.  Contact a health care provider if:  · You have more redness, swelling, pain, or other symptoms that do not improve with treatment.  · You have fluid, blood, or pus coming from your toenail.  Get help right away if:  · You have a red streak on your skin that starts at your foot and spreads up your leg.  · You have a fever.  Summary  · An ingrown toenail occurs when the corner or sides of a toenail grow into the  surrounding skin. This causes discomfort and pain. The big toe is most commonly affected, but any of the toes can be affected.  · If an ingrown toenail is not treated, it can become infected.  · Fluid or pus draining from your toenail is a sign of infection. Your health care provider may need to drain it. You may be given antibiotics to treat the infection.  · Trimming your toenails regularly and properly can help you prevent an ingrown toenail.  This information is not intended to replace advice given to you by your health care provider. Make sure you discuss any questions you have with your health care provider.  Document Released: 12/15/2001 Document Revised: 09/05/2018 Document Reviewed: 09/05/2018  [employer] Interactive Patient Education © 2019 [employer]

## 2019-06-17 ENCOUNTER — OFFICE VISIT (OUTPATIENT)
Dept: FAMILY MEDICINE CLINIC | Facility: CLINIC | Age: 44
End: 2019-06-17

## 2019-06-17 VITALS
BODY MASS INDEX: 35.79 KG/M2 | WEIGHT: 202 LBS | HEIGHT: 63 IN | OXYGEN SATURATION: 98 % | DIASTOLIC BLOOD PRESSURE: 76 MMHG | HEART RATE: 84 BPM | SYSTOLIC BLOOD PRESSURE: 124 MMHG

## 2019-06-17 DIAGNOSIS — M79.671 BILATERAL FOOT PAIN: ICD-10-CM

## 2019-06-17 DIAGNOSIS — M20.12 VALGUS DEFORMITY OF BOTH GREAT TOES: ICD-10-CM

## 2019-06-17 DIAGNOSIS — E66.01 CLASS 2 SEVERE OBESITY DUE TO EXCESS CALORIES WITH SERIOUS COMORBIDITY AND BODY MASS INDEX (BMI) OF 35.0 TO 35.9 IN ADULT (HCC): Primary | ICD-10-CM

## 2019-06-17 DIAGNOSIS — M20.11 VALGUS DEFORMITY OF BOTH GREAT TOES: ICD-10-CM

## 2019-06-17 DIAGNOSIS — L60.0 INGROWN TOENAIL OF LEFT FOOT: ICD-10-CM

## 2019-06-17 DIAGNOSIS — M79.672 BILATERAL FOOT PAIN: ICD-10-CM

## 2019-06-17 PROCEDURE — 99213 OFFICE O/P EST LOW 20 MIN: CPT | Performed by: FAMILY MEDICINE

## 2019-06-17 RX ORDER — PHENTERMINE HYDROCHLORIDE 37.5 MG/1
37.5 TABLET ORAL
Qty: 30 TABLET | Refills: 0 | Status: SHIPPED | OUTPATIENT
Start: 2019-06-17 | End: 2019-07-15 | Stop reason: SDUPTHER

## 2019-06-17 NOTE — PROGRESS NOTES
Subjective   Madalyn Villegas is a 44 y.o. female.     History of Present Illness  Mrs. Villegas presents today for routine follow-up on several chronic medical problems.     Has chronic obesity in association with prediabetes.  Has markedly decreased sugar intake.  Very successful in weight loss efforts until recent weeks when she quit smoking. Restart phentermine at last visit. Down 2 lbs. Not able to exercise recently due to ingrown toenail. Seen at  and pain improving .having increased foot pain due to bunions a well.    The following portions of the patient's history were reviewed and updated as appropriate: allergies, current medications, past family history, past medical history, past social history, past surgical history and problem list.    Review of Systems   Constitutional: Positive for fatigue. Negative for diaphoresis and fever.   HENT: Positive for congestion and postnasal drip. Negative for rhinorrhea, sore throat and trouble swallowing.    Eyes: Negative for visual disturbance.   Respiratory: Negative for cough, shortness of breath and wheezing.    Cardiovascular: Negative for chest pain, palpitations and leg swelling.   Gastrointestinal: Negative for abdominal pain, diarrhea, nausea and vomiting.   Endocrine: Positive for heat intolerance. Negative for polydipsia and polyuria.   Genitourinary: Negative for dysuria and hematuria.   Musculoskeletal: Positive for arthralgias, back pain and myalgias.   Skin: Negative for rash and wound.   Neurological: Positive for headaches. Negative for dizziness, tremors and weakness.   Hematological: Negative for adenopathy. Bruises/bleeds easily.   Psychiatric/Behavioral: Negative for confusion, dysphoric mood and sleep disturbance. The patient is not nervous/anxious.        Objective    Vitals:    06/17/19 0816   BP: 124/76   Pulse: 84   SpO2: 98%     Body mass index is 35.78 kg/m².      06/17/19 0816   Weight: 91.6 kg (202 lb)       Physical Exam    Constitutional: She is oriented to person, place, and time. She appears well-developed and well-nourished. She is cooperative. She does not appear ill. No distress.   endomorphic   HENT:   Head: Normocephalic and atraumatic.   Mouth/Throat: Mucous membranes are normal. Mucous membranes are not dry.   Mallampati class 3   Eyes: Conjunctivae and lids are normal. Right eye exhibits no nystagmus. Left eye exhibits no nystagmus.   Cardiovascular: Normal rate, regular rhythm, normal heart sounds and intact distal pulses. Exam reveals no gallop.   No murmur heard.  Pulmonary/Chest: Effort normal and breath sounds normal.     Vascular Status -  Her right foot exhibits no edema. Her left foot exhibits no edema.  Skin Integrity  -  She has callous right foot and right foot ingrown toenail.  She has no right foot ulcer.She has callous left foot. She has no left foot ulcer..   Foot Structure and Biomechanics -  Her right foot exhibits hallux valgus.  Her left foot exhibits hallux valgus.  Neurological: She is alert and oriented to person, place, and time. She displays no tremor. No sensory deficit. Gait normal.   Skin: Skin is warm and dry. No bruising and no rash noted.   Psychiatric: She has a normal mood and affect. Her behavior is normal. Cognition and memory are normal.   Good eye contact. Answers questions appropriately. Good personal hygiene and grooming.   Nursing note and vitals reviewed.      Assessment/Plan   Madalyn was seen today for weight check.    Diagnoses and all orders for this visit:    Class 2 severe obesity due to excess calories with serious comorbidity and body mass index (BMI) of 35.0 to 35.9 in adult (CMS/[facility])    Valgus deformity of both great toes  -     Ambulatory Referral to Podiatry    Ingrown toenail of left foot    Bilateral foot pain  -     Ambulatory Referral to Podiatry    Other orders  -     phentermine (ADIPEX-P) 37.5 MG tablet; Take 1 tablet by mouth Every Morning Before Breakfast.        Chronic obesity with excellent success in lifestyle modification, weight loss efforts until the last several weeks in which she has had some initial weight gain, then plateua with smoking cessation. Once again reviewed risk/benefits of visual side effects of Adipex.  He voiced understanding wished to proceed with treatment as she is doing quite well on this in the past.  She has plan in place for monitoring caloric intake, increasing physical activity.     Continue wound care to ingrown toenail. No active drainage, cellulitis.    Refer to podiatry for chronic foot pain.    Routine f/u in 1 month, sooner as needed.  Patient was encouraged to keep me informed of any acute changes, lack of improvement, or any new concerning symptoms.  Pt is aware of reasons to seek emergent care.  Patient voiced understanding of all instructions and denied further questions.  As part of patient's treatment plan I am prescribing a controlled substance.  The patient has been made aware of the appropriate use of such medications, including potential risk of somnolence, limited ability to drive and/or work safely, and potential for dependence and/or overdose.  It has also been made clear that these medications are for use by this patient only, without concomitant use of alcohol or other substances, unless prescribed.  MARICRUZ report reviewed and scanned into chart.  Last MARICRUZ date 5/21/19.

## 2019-06-18 LAB
IGA SERPL-MCNC: 84 MG/DL (ref 87–352)
IGG SERPL-MCNC: 557 MG/DL (ref 700–1600)
IGM SERPL-MCNC: 180 MG/DL (ref 26–217)

## 2019-06-24 DIAGNOSIS — D80.3 IGG DEFICIENCY (HCC): ICD-10-CM

## 2019-06-24 DIAGNOSIS — D80.2 IGA DEFICIENCY (HCC): Primary | ICD-10-CM

## 2019-06-29 ENCOUNTER — RESULTS ENCOUNTER (OUTPATIENT)
Dept: FAMILY MEDICINE CLINIC | Facility: CLINIC | Age: 44
End: 2019-06-29

## 2019-06-29 DIAGNOSIS — D80.3 IGG DEFICIENCY (HCC): ICD-10-CM

## 2019-06-29 DIAGNOSIS — D80.2 IGA DEFICIENCY (HCC): ICD-10-CM

## 2019-07-15 ENCOUNTER — OFFICE VISIT (OUTPATIENT)
Dept: FAMILY MEDICINE CLINIC | Facility: CLINIC | Age: 44
End: 2019-07-15

## 2019-07-15 VITALS
WEIGHT: 201 LBS | BODY MASS INDEX: 35.61 KG/M2 | SYSTOLIC BLOOD PRESSURE: 110 MMHG | HEART RATE: 100 BPM | HEIGHT: 63 IN | DIASTOLIC BLOOD PRESSURE: 70 MMHG

## 2019-07-15 DIAGNOSIS — E66.01 CLASS 2 SEVERE OBESITY DUE TO EXCESS CALORIES WITH SERIOUS COMORBIDITY AND BODY MASS INDEX (BMI) OF 35.0 TO 35.9 IN ADULT (HCC): Primary | ICD-10-CM

## 2019-07-15 PROCEDURE — 99213 OFFICE O/P EST LOW 20 MIN: CPT | Performed by: FAMILY MEDICINE

## 2019-07-15 RX ORDER — PHENTERMINE HYDROCHLORIDE 37.5 MG/1
37.5 TABLET ORAL
Qty: 30 TABLET | Refills: 0 | Status: SHIPPED | OUTPATIENT
Start: 2019-07-15 | End: 2019-08-19 | Stop reason: SDUPTHER

## 2019-07-15 NOTE — PROGRESS NOTES
"Subjective   Madalyn Villegas is a 44 y.o. female.     History of Present Illness  Mrs. Villegas presents today for routine weight check.  She is currently being treated with Adipex for chronic obesity.  Highest adult weight 243 on 11/27/2017.  With significant dietary modification efforts, markedly increased physical activity she reached a weight donavan of 180 on 1/15/2019 (6 months ago).  Shortly thereafter she quit smoking.  Had approximately 25 pound weight gain up to 204 in May of this year.  Over the last 2 months she has been working to \"get back to where she was\".  This past month was particularly difficult as she has had significant marital discord, major car trouble prompting her to have to purchase new vehicle even after significant spending on repairs of old car.  She is proud to report that she has not gone back to smoking although she was time to do to manage her stress.  She has not been exercising as frequently due to problems with chronic ingrown toenail (she has an upcoming podiatry appointment).  She has found it harder to make healthy food choices and caloric intake has increased.    The following portions of the patient's history were reviewed and updated as appropriate: allergies, current medications, past family history, past medical history, past social history, past surgical history and problem list.    Review of Systems   Constitutional: Positive for fatigue and unexpected weight change. Negative for diaphoresis and fever.   HENT: Positive for congestion and postnasal drip. Negative for mouth sores, rhinorrhea, sinus pain, sore throat and trouble swallowing.    Eyes: Negative for visual disturbance.   Respiratory: Negative for cough, shortness of breath and wheezing.    Cardiovascular: Negative for chest pain, palpitations and leg swelling.   Gastrointestinal: Negative for abdominal pain, diarrhea, nausea and vomiting.   Endocrine: Positive for heat intolerance. Negative for polydipsia and " polyuria.   Genitourinary: Negative for dysuria and hematuria.   Musculoskeletal: Positive for arthralgias, back pain and myalgias.        None new   Skin: Negative for rash and wound.   Neurological: Positive for headaches. Negative for dizziness, tremors and weakness.   Hematological: Negative for adenopathy. Bruises/bleeds easily.   Psychiatric/Behavioral: Negative for confusion, dysphoric mood and sleep disturbance.        Increased psychosocial stress       Objective    Vitals:    07/15/19 0909   BP: 110/70   Pulse: 100     Body mass index is 35.61 kg/m².      07/15/19  0909   Weight: 91.2 kg (201 lb)       Physical Exam   Constitutional: She is oriented to person, place, and time. She appears well-developed and well-nourished. She is cooperative. She does not appear ill. No distress.   obese   HENT:   Head: Normocephalic and atraumatic.   Mouth/Throat: Mucous membranes are normal. Mucous membranes are not dry.   Eyes: Conjunctivae and lids are normal. Right eye exhibits no nystagmus. Left eye exhibits no nystagmus.   Cardiovascular: Normal rate, regular rhythm, normal heart sounds and intact distal pulses.   HR decreased to 80s from time of triage   Pulmonary/Chest: Effort normal and breath sounds normal.     Vascular Status -  Her right foot exhibits no edema. Her left foot exhibits no edema.  Neurological: She is alert and oriented to person, place, and time. She displays no tremor. Gait normal.   Skin: Skin is warm and dry. No bruising and no rash noted.   Psychiatric: She has a normal mood and affect. Her speech is normal and behavior is normal. Judgment and thought content normal. Cognition and memory are normal.   Good eye contact. Answers questions appropriately. Good personal hygiene and grooming.   Nursing note and vitals reviewed.      Assessment/Plan   Madalyn was seen today for weight check.    Diagnoses and all orders for this visit:    Class 2 severe obesity due to excess calories with serious  comorbidity and body mass index (BMI) of 35.0 to 35.9 in adult (CMS/[facility])    Other orders  -     phentermine (ADIPEX-P) 37.5 MG tablet; Take 1 tablet by mouth Every Morning Before Breakfast.      Chronic obesity with excellent initial success in lifestyle modification, weight loss efforts until the last 2 months in which she has had some initial weight gain, then plateua with smoking cessation. Once again reviewed risk/benefits of visual side effects of Adipex.  SHe voiced understanding wished to proceed with treatment as she is doing quite well on this in the past.  She has plan in place for monitoring caloric intake, increasing physical activity, stress mgnt discussed as well. Will be able to increase physical activity once podiatry issues improved.    Nutrition and activity goals reviewed including: mainly water to drink, limit white flour/processed sugar, higher lean protein, high fiber carbs, good breakfast, working back toward at least 150 mins moderate cardio per week, resistance training 2x/week.    Routine f/u in 1 month, sooner as needed.  Patient was encouraged to keep me informed of any acute changes, lack of improvement, or any new concerning symptoms.  Pt is aware of reasons to seek emergent care.  Patient voiced understanding of all instructions and denied further questions.  As part of patient's treatment plan I am prescribing a controlled substance. The patient has been made aware of the appropriate use of such medications, including potential risk of somnolence, limited ability to drive and/or work safely, and potential for dependence and/or overdose.  It has also been made clear that these medications are for use by this patient only, without concomitant use of alcohol or other substances, unless prescribed.  MARICRUZ report reviewed and scanned into chart.  Last MARICRUZ date 5/21/19.  History and physical exam exhibit continued safe and appropriate use of controlled substance.

## 2019-08-19 ENCOUNTER — OFFICE VISIT (OUTPATIENT)
Dept: FAMILY MEDICINE CLINIC | Facility: CLINIC | Age: 44
End: 2019-08-19

## 2019-08-19 VITALS
WEIGHT: 203.5 LBS | DIASTOLIC BLOOD PRESSURE: 72 MMHG | HEART RATE: 97 BPM | SYSTOLIC BLOOD PRESSURE: 112 MMHG | OXYGEN SATURATION: 98 % | BODY MASS INDEX: 36.05 KG/M2

## 2019-08-19 DIAGNOSIS — I10 ESSENTIAL HYPERTENSION: ICD-10-CM

## 2019-08-19 DIAGNOSIS — E78.49 OTHER HYPERLIPIDEMIA: ICD-10-CM

## 2019-08-19 DIAGNOSIS — E66.01 CLASS 2 SEVERE OBESITY DUE TO EXCESS CALORIES WITH SERIOUS COMORBIDITY AND BODY MASS INDEX (BMI) OF 36.0 TO 36.9 IN ADULT (HCC): ICD-10-CM

## 2019-08-19 DIAGNOSIS — R19.7 DIARRHEA, UNSPECIFIED TYPE: Primary | ICD-10-CM

## 2019-08-19 DIAGNOSIS — R12 HEARTBURN: ICD-10-CM

## 2019-08-19 DIAGNOSIS — R11.0 NAUSEA: ICD-10-CM

## 2019-08-19 DIAGNOSIS — R10.84 GENERALIZED ABDOMINAL PAIN: ICD-10-CM

## 2019-08-19 PROCEDURE — 99214 OFFICE O/P EST MOD 30 MIN: CPT | Performed by: FAMILY MEDICINE

## 2019-08-19 RX ORDER — CHLORTHALIDONE 25 MG/1
25 TABLET ORAL DAILY
Qty: 90 TABLET | Refills: 1 | Status: SHIPPED | OUTPATIENT
Start: 2019-08-19 | End: 2020-03-02 | Stop reason: SDUPTHER

## 2019-08-19 RX ORDER — ATORVASTATIN CALCIUM 10 MG/1
10 TABLET, FILM COATED ORAL NIGHTLY
Qty: 90 TABLET | Refills: 1 | Status: SHIPPED | OUTPATIENT
Start: 2019-08-19 | End: 2020-03-02 | Stop reason: SDUPTHER

## 2019-08-19 RX ORDER — PHENTERMINE HYDROCHLORIDE 37.5 MG/1
37.5 TABLET ORAL
Qty: 30 TABLET | Refills: 0 | Status: SHIPPED | OUTPATIENT
Start: 2019-08-19 | End: 2019-09-19 | Stop reason: SDUPTHER

## 2019-08-19 NOTE — PROGRESS NOTES
Subjective   Madalyn Villegas is a 44 y.o. female.     History of Present Illness  Mrs. Villegas originally scheduled apt for weight check/obesity mgnt. She has cont'd to use adepx over past month but has struggles with exercise and dietary compliance. Weight up 2 lbs. In addition she c/o increased gas, nausea, occ vomiting, increased heartburn and diarrhea. No particular food triggers. Sometimes occurs with eating and other times not. She feels her symptoms seem to be triggered by worsening reflux. No dysphagia, no blood in stool.    She has HTN for which she is on chlorthalidone. BP controlled. Taking as rx'd.    On lipitor for HLP, tolerating well.     She is currently on nexium for GERD. Does not believe she has ever had upper/lower endoscopy. Has intake gallbladder as well.    The following portions of the patient's history were reviewed and updated as appropriate: allergies, current medications, past family history, past medical history, past social history, past surgical history and problem list.    Review of Systems   Constitutional: Positive for fatigue. Negative for diaphoresis and fever.   HENT: Positive for congestion and postnasal drip. Negative for mouth sores, rhinorrhea, sinus pain, sore throat and trouble swallowing.    Eyes: Negative for visual disturbance.   Respiratory: Negative for cough, shortness of breath and wheezing.    Cardiovascular: Negative for chest pain, palpitations and leg swelling.   Gastrointestinal: Positive for abdominal distention, abdominal pain, diarrhea, nausea and vomiting. Negative for blood in stool.        Heartburn   Endocrine: Positive for heat intolerance. Negative for polydipsia and polyuria.   Genitourinary: Negative for dysuria and hematuria.   Musculoskeletal: Positive for arthralgias, back pain and myalgias.        None new   Skin: Negative for rash and wound.   Neurological: Positive for headaches. Negative for dizziness, tremors, syncope and weakness.    Hematological: Negative for adenopathy. Bruises/bleeds easily.   Psychiatric/Behavioral: Positive for dysphoric mood (mildly). Negative for confusion, sleep disturbance and suicidal ideas.   Pt's previous ROS reviewed and updated as indicated.       Objective    Vitals:    08/19/19 1252   BP: 112/72   Pulse: 97   SpO2: 98%     Body mass index is 36.05 kg/m².      08/19/19  1252   Weight: 92.3 kg (203 lb 8 oz)       Physical Exam   Constitutional: She is oriented to person, place, and time. She appears well-developed and well-nourished. She is cooperative. She appears ill (mildly). No distress.   obese   HENT:   Head: Normocephalic and atraumatic.   Mouth/Throat: Mucous membranes are normal. Mucous membranes are not dry.   Eyes: Conjunctivae and lids are normal. No scleral icterus. Right eye exhibits no nystagmus. Left eye exhibits no nystagmus.   Cardiovascular: Normal rate, regular rhythm, normal heart sounds and intact distal pulses. Exam reveals no gallop.   No murmur heard.  HR decreased to 80s from time of triage   Pulmonary/Chest: Effort normal and breath sounds normal.   Abdominal: Soft. She exhibits distension (mild). She exhibits no mass. Bowel sounds are decreased. There is no hepatosplenomegaly. There is tenderness (mild) in the epigastric area. There is no rigidity, no rebound, no guarding and no CVA tenderness.     Vascular Status -  Her right foot exhibits no edema. Her left foot exhibits no edema.  Neurological: She is alert and oriented to person, place, and time. She displays no tremor. Gait normal.   Skin: Skin is warm and dry. No bruising and no rash noted.   No jaundice   Psychiatric: Her speech is normal and behavior is normal. Judgment and thought content normal. Her mood appears anxious. Cognition and memory are normal.   Good eye contact. Answers questions appropriately. Good personal hygiene and grooming.   Nursing note and vitals reviewed.  Pt's previous physical exam reviewed and updated  as indicated.    Lab Results   Component Value Date    WBC 12.7 (H) 05/24/2018    HGB 15.0 05/24/2018    HCT 45.0 05/24/2018    MCV 85 05/24/2018     05/24/2018       Lab Results   Component Value Date    GLUCOSE 96 11/09/2017    BUN 17 03/15/2019    CREATININE 0.60 03/15/2019    EGFRIFNONA 108 03/15/2019    EGFRIFAFRI 131 03/15/2019    BCR 28.3 (H) 03/15/2019    K 4.0 03/15/2019    CO2 30.0 03/15/2019    CALCIUM 10.9 (H) 03/15/2019    PROTENTOTREF 6.7 03/15/2019    ALBUMIN 4.20 03/15/2019    LABIL2 1.7 03/15/2019    AST 25 03/15/2019    ALT 29 03/15/2019       Lab Results   Component Value Date    CHOL 228 (H) 07/18/2016    CHLPL 219 (H) 05/24/2018    TRIG 291 (H) 05/24/2018    HDL 45 05/24/2018     (H) 05/24/2018       Lab Results   Component Value Date    HGBA1C 5.1 03/15/2019       Lab Results   Component Value Date    TSH 1.160 05/24/2018       Assessment/Plan   Madalyn was seen today for obesity.    Diagnoses and all orders for this visit:    Diarrhea, unspecified type    Other hyperlipidemia  -     atorvastatin (LIPITOR) 10 MG tablet; Take 1 tablet by mouth Every Night.    Essential hypertension  -     chlorthalidone (HYGROTON) 25 MG tablet; Take 1 tablet by mouth Daily.    Heartburn  -     H. Pylori Antigen, Stool - Stool, Per Rectum    Generalized abdominal pain  -     H. Pylori Antigen, Stool - Stool, Per Rectum    Class 2 severe obesity due to excess calories with serious comorbidity and body mass index (BMI) of 36.0 to 36.9 in adult (CMS/[facility])    Nausea  -     H. Pylori Antigen, Stool - Stool, Per Rectum    Other orders  -     phentermine (ADIPEX-P) 37.5 MG tablet; Take 1 tablet by mouth Every Morning Before Breakfast.       Multiple GI symptoms as above. I have reviewed with her potential DDx including gallbladder pathology, PUDz, H pylori, etc. She voices understanding and is amenable to H pylori test as above with US/HIDA is negative. She declines POC anti-emetic today. Declines rx for  anti-emetic.     HTN controlled. Continue thiazide diuretic.    HLP with good tolerance of statin.    Obesity with difficulty with lifestyle goals over past month. Nutrition and activity goals reviewed including: mainly water to drink, limit white flour/processed sugar, higher lean protein, high fiber carbs, good breakfast, working toward 150 mins cardio per week, resistance training 2x/week.    I will contact patient regarding test results and provide instructions regarding any necessary changes in plan of care.  F/u in 1 month, sooner as needed/instructed.  Patient was encouraged to keep me informed of any acute changes, lack of improvement, or any new concerning symptoms.  Pt is aware of reasons to seek emergent care.  Patient voiced understanding of all instructions and denied further questions.

## 2019-08-22 LAB — H PYLORI AG STL QL IA: POSITIVE

## 2019-08-26 RX ORDER — TETRACYCLINE HYDROCHLORIDE 500 MG/1
500 CAPSULE ORAL 4 TIMES DAILY
Qty: 40 CAPSULE | Refills: 0 | Status: SHIPPED | OUTPATIENT
Start: 2019-08-26 | End: 2019-09-05

## 2019-08-26 RX ORDER — METRONIDAZOLE 250 MG/1
250 TABLET ORAL 4 TIMES DAILY
Qty: 40 TABLET | Refills: 0 | Status: SHIPPED | OUTPATIENT
Start: 2019-08-26 | End: 2019-09-05

## 2019-09-19 ENCOUNTER — OFFICE VISIT (OUTPATIENT)
Dept: FAMILY MEDICINE CLINIC | Facility: CLINIC | Age: 44
End: 2019-09-19

## 2019-09-19 VITALS
HEART RATE: 99 BPM | BODY MASS INDEX: 36.86 KG/M2 | SYSTOLIC BLOOD PRESSURE: 115 MMHG | WEIGHT: 208 LBS | TEMPERATURE: 97.7 F | OXYGEN SATURATION: 97 % | DIASTOLIC BLOOD PRESSURE: 70 MMHG | HEIGHT: 63 IN

## 2019-09-19 DIAGNOSIS — R19.7 DIARRHEA, UNSPECIFIED TYPE: ICD-10-CM

## 2019-09-19 DIAGNOSIS — E66.01 CLASS 2 SEVERE OBESITY DUE TO EXCESS CALORIES WITH SERIOUS COMORBIDITY AND BODY MASS INDEX (BMI) OF 36.0 TO 36.9 IN ADULT (HCC): ICD-10-CM

## 2019-09-19 DIAGNOSIS — A04.8 H. PYLORI INFECTION: Primary | ICD-10-CM

## 2019-09-19 DIAGNOSIS — M79.671 RIGHT FOOT PAIN: ICD-10-CM

## 2019-09-19 DIAGNOSIS — M25.571 TOE JOINT PAIN, RIGHT: ICD-10-CM

## 2019-09-19 PROCEDURE — 99214 OFFICE O/P EST MOD 30 MIN: CPT | Performed by: NURSE PRACTITIONER

## 2019-09-19 RX ORDER — PHENTERMINE HYDROCHLORIDE 37.5 MG/1
37.5 TABLET ORAL
Qty: 30 TABLET | Refills: 0 | Status: SHIPPED | OUTPATIENT
Start: 2019-09-19 | End: 2019-10-23 | Stop reason: SDUPTHER

## 2019-09-19 NOTE — PROGRESS NOTES
Subjective   Madalyn Villegas is a 44 y.o. female.     Patient is here today for follow up on her H. Pylori infection. She completed all of her medication and is doing much better with this. She was having diarrhea every time she ate, but now, she is having diarrhea, at times. She has always had diarrhea, often, but still having it a little more often than normal.    She is also here for follow up on her obesity. She started Adipex last month but has not felt like it has worked as well as it usually does, due to the medication she was on for her H Pylori and her diarrhea. Normally, it works well to help suppress her appetite and help her lose weight. It did not this past month.  She has also, not been able to exercise like she should, due to a painful toe and area, on her right foot. She is not sure why, did not do anything to it, but thinks it could be related to an uncomfortable pair of shoes. It has been hurting for several weeks now. She would like to continue it, now that her GI issues have improved.         The following portions of the patient's history were reviewed and updated as appropriate: allergies, current medications, past family history, past medical history, past social history, past surgical history and problem list.    Review of Systems   Constitutional: Negative.    HENT: Negative.    Eyes: Negative.    Respiratory: Negative.    Cardiovascular: Negative.    Gastrointestinal: Positive for diarrhea. Negative for abdominal distention, abdominal pain, anal bleeding, blood in stool, constipation, nausea, rectal pain and vomiting.   Genitourinary: Negative.    Musculoskeletal: Positive for arthralgias (right foot) and gait problem.   Skin: Negative.    Allergic/Immunologic: Negative.    Neurological: Negative for dizziness, syncope, weakness, numbness and headaches.   Hematological: Negative for adenopathy.   Psychiatric/Behavioral: Negative for confusion and suicidal ideas. The patient is not  nervous/anxious.      Vitals:    09/19/19 1651   BP: 115/70   Pulse: 99   Temp: 97.7 °F (36.5 °C)   SpO2: 97%     Objective   Physical Exam   Constitutional: She is oriented to person, place, and time. She appears well-developed and well-nourished. No distress.   HENT:   Head: Normocephalic.   Right Ear: External ear normal.   Left Ear: External ear normal.   Nose: Nose normal.   Eyes: Conjunctivae are normal.   Neck: Normal range of motion. Neck supple.   Cardiovascular: Normal rate, regular rhythm and normal heart sounds.   No murmur heard.  Pulmonary/Chest: Effort normal and breath sounds normal. No respiratory distress. She has no wheezes. She has no rales. She exhibits no tenderness.   Abdominal: Soft. Bowel sounds are normal. She exhibits no distension and no mass. There is no hepatosplenomegaly or splenomegaly. There is no tenderness. There is no rebound and no guarding. No hernia.   Musculoskeletal: Normal range of motion. She exhibits no edema or tenderness.   NROM all  Major joints        Neurological: She is alert and oriented to person, place, and time. Coordination and gait normal.   Skin: Skin is warm and dry. No rash noted.   Psychiatric: She has a normal mood and affect. Her behavior is normal. Judgment and thought content normal.   Nursing note and vitals reviewed.      Assessment/Plan   Madalyn was seen today for follow-up.    Diagnoses and all orders for this visit:    H. pylori infection  -     H. Pylori Antigen, Stool - Stool, Per Rectum; Future  -     CBC (No Diff)  -     Comprehensive Metabolic Panel    Diarrhea, unspecified type  -     H. Pylori Antigen, Stool - Stool, Per Rectum; Future  -     CBC (No Diff)  -     Comprehensive Metabolic Panel    Class 2 severe obesity due to excess calories with serious comorbidity and body mass index (BMI) of 36.0 to 36.9 in adult (CMS/[facility])  -     phentermine (ADIPEX-P) 37.5 MG tablet; Take 1 tablet by mouth Every Morning Before Breakfast.    Right foot  pain  -     XR Foot 3+ View Right; Future  -     Uric acid    Toe joint pain, right  -     XR Foot 3+ View Right; Future  -     Uric acid      Specimen cup given to patient today, to collect a stool sampe and RTC,for evaluation of  H Pylori resolution. CBC and CMP obtained in clinic today.    Adipex refilled today, for assistance in the treatment of her obesity. Nutrition and activity goals reviewed including: mainly water to drink, limit white flour/processed sugar, higher lean protein, high fiber carbs, regular meals, working toward 150 mins cardio per week, resistance training 2x/week.    Xray ordered of right foot, for further evaluation of pain. Uric acid obtained, for assessment of Gout.     I will contact patient regarding test results and provide instructions regarding any necessary changes in plan of care.    Patient was encouraged to keep me informed of any acute changes, lack of improvement, or any new concerning symptoms. Patient voiced understanding of all instructions and denied further questions.    Patient to RTC pending lab and imaging, sooner if needed.

## 2019-09-20 LAB
ALBUMIN SERPL-MCNC: 4.7 G/DL (ref 3.5–5.5)
ALBUMIN/GLOB SERPL: 2.4 {RATIO} (ref 1.2–2.2)
ALP SERPL-CCNC: 61 IU/L (ref 39–117)
ALT SERPL-CCNC: 42 IU/L (ref 0–32)
AST SERPL-CCNC: 32 IU/L (ref 0–40)
BILIRUB SERPL-MCNC: 0.4 MG/DL (ref 0–1.2)
BUN SERPL-MCNC: 23 MG/DL (ref 6–24)
BUN/CREAT SERPL: 28 (ref 9–23)
CALCIUM SERPL-MCNC: 10.1 MG/DL (ref 8.7–10.2)
CHLORIDE SERPL-SCNC: 102 MMOL/L (ref 96–106)
CO2 SERPL-SCNC: 23 MMOL/L (ref 20–29)
CREAT SERPL-MCNC: 0.81 MG/DL (ref 0.57–1)
ERYTHROCYTE [DISTWIDTH] IN BLOOD BY AUTOMATED COUNT: 13.2 % (ref 12.3–15.4)
GLOBULIN SER CALC-MCNC: 2 G/DL (ref 1.5–4.5)
GLUCOSE SERPL-MCNC: 97 MG/DL (ref 65–99)
HCT VFR BLD AUTO: 40.4 % (ref 34–46.6)
HGB BLD-MCNC: 13.7 G/DL (ref 11.1–15.9)
MCH RBC QN AUTO: 27.8 PG (ref 26.6–33)
MCHC RBC AUTO-ENTMCNC: 33.9 G/DL (ref 31.5–35.7)
MCV RBC AUTO: 82 FL (ref 79–97)
PLATELET # BLD AUTO: 270 X10E3/UL (ref 150–450)
POTASSIUM SERPL-SCNC: 3.6 MMOL/L (ref 3.5–5.2)
PROT SERPL-MCNC: 6.7 G/DL (ref 6–8.5)
RBC # BLD AUTO: 4.92 X10E6/UL (ref 3.77–5.28)
SODIUM SERPL-SCNC: 143 MMOL/L (ref 134–144)
URATE SERPL-MCNC: 6.1 MG/DL (ref 2.5–7.1)
WBC # BLD AUTO: 9.7 X10E3/UL (ref 3.4–10.8)

## 2019-10-23 ENCOUNTER — OFFICE VISIT (OUTPATIENT)
Dept: FAMILY MEDICINE CLINIC | Facility: CLINIC | Age: 44
End: 2019-10-23

## 2019-10-23 DIAGNOSIS — E78.2 MIXED HYPERLIPIDEMIA: ICD-10-CM

## 2019-10-23 DIAGNOSIS — I10 ESSENTIAL HYPERTENSION: ICD-10-CM

## 2019-10-23 DIAGNOSIS — G89.29 CHRONIC PAIN IN RIGHT FOOT: Primary | ICD-10-CM

## 2019-10-23 DIAGNOSIS — E66.01 CLASS 2 SEVERE OBESITY DUE TO EXCESS CALORIES WITH SERIOUS COMORBIDITY AND BODY MASS INDEX (BMI) OF 36.0 TO 36.9 IN ADULT (HCC): ICD-10-CM

## 2019-10-23 DIAGNOSIS — M20.5X1 CONTRACTURE OF TOE OF RIGHT FOOT: ICD-10-CM

## 2019-10-23 DIAGNOSIS — M79.671 CHRONIC PAIN IN RIGHT FOOT: Primary | ICD-10-CM

## 2019-10-23 PROCEDURE — 99214 OFFICE O/P EST MOD 30 MIN: CPT | Performed by: FAMILY MEDICINE

## 2019-10-23 RX ORDER — PHENTERMINE HYDROCHLORIDE 37.5 MG/1
37.5 TABLET ORAL
Qty: 30 TABLET | Refills: 0 | Status: SHIPPED | OUTPATIENT
Start: 2019-10-23 | End: 2019-12-02

## 2019-10-23 NOTE — PROGRESS NOTES
"Subjective   Madalyn Villegas is a 44 y.o. female.     History of Present Illness  Mrs. Villegas presents today for routine f/u and weight check. Has restarted phentermine for assistance with weight loss. She is down 8 lbs from last visit. Exercising regularly but having trouble using treadmill due to toe pain (see below). She feels she does better tracking macros than counting carbs.     She has HTN for which she is on chlorthalidone. BP controlled. Taking as rx'd.     On lipitor for HLP, tolerating well.     She also c/o right 2nd toe pain x 3 months. Began with \"blister like\" lesion on top of toe. This slowly resolved but toe has remained swollen, quite sore. Now starting to \"draw up\" with more severe pain. Worse with prolonged weight bearing. Has tried changing shoes without help. Has had to limit exercise.    Pt's previous HPI reviewed and updated as indicated.     The following portions of the patient's history were reviewed and updated as appropriate: allergies, current medications, past family history, past medical history, past social history, past surgical history and problem list.    Review of Systems   Constitutional: Positive for fatigue. Negative for diaphoresis and fever.   HENT: Positive for congestion and postnasal drip. Negative for mouth sores, rhinorrhea, sinus pain, sore throat and trouble swallowing.    Eyes: Negative for visual disturbance.   Respiratory: Negative for cough, shortness of breath and wheezing.    Cardiovascular: Negative for chest pain, palpitations and leg swelling.   Gastrointestinal: Negative for abdominal pain, diarrhea, nausea and vomiting.   Endocrine: Positive for heat intolerance. Negative for polydipsia and polyuria.   Genitourinary: Negative for dysuria and hematuria.   Musculoskeletal: Positive for arthralgias, back pain and myalgias.   Skin: Negative for rash and wound.   Neurological: Positive for headaches. Negative for dizziness, tremors and weakness. "   Hematological: Negative for adenopathy. Bruises/bleeds easily.   Psychiatric/Behavioral: Negative for confusion, dysphoric mood and sleep disturbance.   Pt's previous ROS reviewed and updated as indicated.       Objective    Vitals:    10/23/19 1522   BP: 124/74   Pulse: 74   Temp: 98.4 °F (36.9 °C)   SpO2: 99%     Body mass index is 35.96 kg/m².      10/23/19  1522   Weight: 92.1 kg (203 lb)       Physical Exam   Constitutional: She is oriented to person, place, and time. She appears well-developed and well-nourished. She is cooperative. She does not appear ill. No distress.   obese   HENT:   Head: Normocephalic and atraumatic.   Mouth/Throat: Mucous membranes are normal. Mucous membranes are not dry.   Eyes: Conjunctivae and lids are normal. Right eye exhibits no nystagmus. Left eye exhibits no nystagmus.   Neck: Phonation normal. Neck supple. Normal carotid pulses present. No thyroid mass present.   Cardiovascular: Normal rate, regular rhythm, normal heart sounds and intact distal pulses.   Pulmonary/Chest: Effort normal and breath sounds normal.   Musculoskeletal:        Right foot: There is bony tenderness (right second digit) and swelling (mild, right 2nd digit). There is normal range of motion and normal capillary refill.     Vascular Status -  Her right foot exhibits no edema. Her left foot exhibits no edema.  Lymphadenopathy:     She has no cervical adenopathy.   Neurological: She is alert and oriented to person, place, and time. She has normal strength. She displays no tremor. No sensory deficit. Gait normal.   Skin: Skin is warm and dry. No bruising and no rash noted.   Psychiatric: She has a normal mood and affect. Her speech is normal and behavior is normal. Judgment and thought content normal. Cognition and memory are normal.   Good eye contact. Answers questions appropriately. Good personal hygiene and grooming.   Nursing note and vitals reviewed.    Lab Results   Component Value Date    WBC 9.7  09/19/2019    HGB 13.7 09/19/2019    HCT 40.4 09/19/2019    MCV 82 09/19/2019     09/19/2019       Lab Results   Component Value Date    GLUCOSE 96 11/09/2017    BUN 23 09/19/2019    CREATININE 0.81 09/19/2019    EGFRIFNONA 87 09/19/2019    EGFRIFAFRI 101 09/19/2019    BCR 28 (H) 09/19/2019    K 3.6 09/19/2019    CO2 23 09/19/2019    CALCIUM 10.1 09/19/2019    PROTENTOTREF 6.7 09/19/2019    ALBUMIN 4.7 09/19/2019    LABIL2 2.4 (H) 09/19/2019    AST 32 09/19/2019    ALT 42 (H) 09/19/2019       Lab Results   Component Value Date    CHOL 228 (H) 07/18/2016    CHLPL 219 (H) 05/24/2018    TRIG 291 (H) 05/24/2018    HDL 45 05/24/2018     (H) 05/24/2018       Lab Results   Component Value Date    HGBA1C 5.1 03/15/2019       Lab Results   Component Value Date    TSH 1.160 05/24/2018       Assessment/Plan   Madalyn was seen today for hyperlipidemia, hypertension and obesity.    Diagnoses and all orders for this visit:    Chronic pain in right foot  -     Ambulatory Referral to Orthopedic Surgery    Essential hypertension    Class 2 severe obesity due to excess calories with serious comorbidity and body mass index (BMI) of 36.0 to 36.9 in adult (CMS/[facility])  -     phentermine (ADIPEX-P) 37.5 MG tablet; Take 1 tablet by mouth Every Morning Before Breakfast.    Contracture of toe of right foot  -     Ambulatory Referral to Orthopedic Surgery    Mixed hyperlipidemia       Refer to ortho for further eval of chronic toe pain with contracture.    HTN controlled. Continue chlorthalidone. Continue statin for HLP. silht    Good response to phentermine. Good lifestyle modification efforts.  Nutrition and activity goals reviewed including: mainly water to drink, limit white flour/processed sugar, higher lean protein, high fiber carbs, regular meals, working toward 150 mins cardio per week, resistance training 2x/week.    Routine f/u in 1 months, sooner as needed.  Patient was encouraged to keep me informed of any acute  changes, lack of improvement, or any new concerning symptoms.  Pt is aware of reasons to seek emergent care.  Patient voiced understanding of all instructions and denied further questions.  As part of patient's treatment plan I am prescribing a controlled substance.  The patient has been made aware of the appropriate use of such medications, including potential risk of somnolence, limited ability to drive and/or work safely, and potential for dependence and/or overdose.  It has also been made clear that these medications are for use by this patient only, without concomitant use of alcohol or other substances, unless prescribed.  MARICRUZ report reviewed and scanned into chart.  Last MARICRUZ date 8/17/19.

## 2019-10-26 VITALS
DIASTOLIC BLOOD PRESSURE: 74 MMHG | TEMPERATURE: 98.4 F | HEIGHT: 63 IN | SYSTOLIC BLOOD PRESSURE: 124 MMHG | OXYGEN SATURATION: 99 % | BODY MASS INDEX: 35.97 KG/M2 | HEART RATE: 74 BPM | WEIGHT: 203 LBS

## 2019-11-11 ENCOUNTER — OFFICE VISIT (OUTPATIENT)
Dept: ORTHOPEDIC SURGERY | Facility: CLINIC | Age: 44
End: 2019-11-11

## 2019-11-11 VITALS — BODY MASS INDEX: 35.98 KG/M2 | HEIGHT: 63 IN | WEIGHT: 203.04 LBS | HEART RATE: 99 BPM | OXYGEN SATURATION: 100 %

## 2019-11-11 DIAGNOSIS — M79.671 RIGHT FOOT PAIN: Primary | ICD-10-CM

## 2019-11-11 DIAGNOSIS — M21.6X1 ACQUIRED METATARSUS VARUS OF RIGHT FOOT: ICD-10-CM

## 2019-11-11 DIAGNOSIS — M24.574 CONTRACTURE OF JOINT OF RIGHT FOOT: ICD-10-CM

## 2019-11-11 PROCEDURE — 99203 OFFICE O/P NEW LOW 30 MIN: CPT | Performed by: ORTHOPAEDIC SURGERY

## 2019-11-11 NOTE — PROGRESS NOTES
NEW PATIENT    Patient: Madalyn Villegas  : 1975    Primary Care Provider: Latrice Santana MD    Requesting Provider: As above    Pain of the Right Foot      History    Chief Complaint: Right foot pain    History of Present Illness: This is a very pleasant 44-year-old woman with about a 1 year history of right forefoot pain.  It has gotten worse over the past several months.  She has noted pain under the second metatarsal head, she reports that it bulges, it hurts more walking and running on the treadmill.  She has not had any treatment as yet.  She reports the pain is 4 out of 10, aching and sharp.  It is worse with activity.  She has noticed that there seems to be a bulge that is intermittently dorsal and plantar around the second metatarsal phalangeal joint.  She works on her feet.  And she goes to the gym daily.  She quit smoking 7 months ago that is excellent.    Current Outpatient Medications on File Prior to Visit   Medication Sig Dispense Refill   • acetaminophen (TYLENOL) 500 MG tablet Take 500 mg by mouth every 6 (six) hours as needed for mild pain (1-3).     • albuterol (PROVENTIL HFA;VENTOLIN HFA) 108 (90 BASE) MCG/ACT inhaler Inhale 2 puffs every 4 (four) hours as needed for wheezing. 18 g 0   • atorvastatin (LIPITOR) 10 MG tablet Take 1 tablet by mouth Every Night. 90 tablet 1   • chlorthalidone (HYGROTON) 25 MG tablet Take 1 tablet by mouth Daily. 90 tablet 1   • Cholecalciferol (VITAMIN D3) 2000 units tablet Take  by mouth.     • DYMISTA 137-50 MCG/ACT suspension      • EPINEPHrine (EPIPEN) 0.3 MG/0.3ML solution auto-injector injection      • esomeprazole (NexIUM) 20 MG capsule Take 20 mg by mouth every morning before breakfast.     • glucosamine-chondroitin 500-400 MG capsule capsule Take 1 capsule by mouth Daily.     • ibuprofen (ADVIL,MOTRIN) 600 MG tablet ibuprofen 600 mg tablet     • meloxicam (MOBIC) 15 MG tablet Take 1 tablet by mouth Daily. 90 tablet 1   • methylcellulose, Laxative,  (CITRUCEL) 500 MG tablet tablet Take 2 tablets by mouth Every 4 (Four) Hours As Needed.     • montelukast (SINGULAIR) 10 MG tablet Take 10 mg by mouth Daily.     • phentermine (ADIPEX-P) 37.5 MG tablet Take 1 tablet by mouth Every Morning Before Breakfast. 30 tablet 0   • Probiotic Product (PROBIOTIC PO) Take  by mouth.     • pseudoephedrine (SUDAFED) 30 MG tablet Take 30 mg by mouth Every 4 (Four) Hours As Needed for Congestion ([employer] BRAND OF SUDAFED).     • TiZANidine (ZANAFLEX) 6 MG capsule Take 1 capsule by mouth 3 (Three) Times a Day As Needed for Muscle Spasms. 270 capsule 1     No current facility-administered medications on file prior to visit.       No Known Allergies   Past Medical History:   Diagnosis Date   • Abnormal EKG    • Asthma     ASTHMA LIKE SYMPTOMS WITH SINUS INFECTION   • Class 2 severe obesity due to excess calories with serious comorbidity and body mass index (BMI) of 35.0 to 35.9 in adult (CMS/[facility]) 7/18/2016   • GERD (gastroesophageal reflux disease)    • History of being tatooed     RIGHT ANKLE   • History of Papanicolaou smear of cervix 09/2017    WNL       • Hyperlipidemia    • Hypertension    • Kidney stones    • Lumbar degenerative disc disease    • Migraine    • Tobacco abuse      Past Surgical History:   Procedure Laterality Date   • COLONOSCOPY N/A 8/24/2017    Procedure: COLONOSCOPY;  Surgeon: Blas Thapa MD;  Location: [facility] ENDOSCOPY;  Service:    • D&C HYSTEROSCOPY ENDOMETRIAL ABLATION N/A 11/17/2017    Procedure: DILATATION AND CURETTAGE HYSTEROSCOPY NOVASURE ENDOMETRIAL ABLATION;  Surgeon: Marin Louie MD;  Location: [facility] OR;  Service:    • KNEE ACL RECONSTRUCTION Right 07/2010   • WISDOM TOOTH EXTRACTION       Family History   Problem Relation Age of Onset   • Arthritis Mother    • Hyperlipidemia Mother    • Hypertension Mother    • Obesity Mother    • Heart attack Mother 64   • Hypertension Father    • Heart disease Father    • Heart attack  "Sister 34   • Hyperlipidemia Sister    • Diabetes Sister    • Hypertension Sister    • Obesity Sister    • Heart disease Sister    • Heart failure Sister    • Obesity Sister    • Diabetes Sister    • Diabetes type II Sister    • Stomach cancer Maternal Uncle    • Cancer Maternal Grandfather       Social History     Socioeconomic History   • Marital status: Significant Other     Spouse name: Not on file   • Number of children: Not on file   • Years of education: Not on file   • Highest education level: Not on file   Tobacco Use   • Smoking status: Former Smoker     Packs/day: 0.75     Years: 25.00     Pack years: 18.75     Types: Cigarettes     Last attempt to quit: 3/16/2019     Years since quittin.6   • Smokeless tobacco: Never Used   Substance and Sexual Activity   • Alcohol use: No   • Drug use: No   • Sexual activity: Yes     Partners: Male     Birth control/protection: Surgical        Review of Systems   Constitutional: Negative.    HENT: Positive for sinus pressure.    Eyes: Positive for visual disturbance.   Respiratory: Negative.    Cardiovascular: Negative.    Gastrointestinal: Positive for abdominal pain, constipation, diarrhea and nausea.   Endocrine: Negative.    Genitourinary: Negative.    Musculoskeletal: Positive for joint swelling.   Skin: Negative.    Allergic/Immunologic: Positive for environmental allergies.   Neurological: Negative.    Hematological: Negative.    Psychiatric/Behavioral: Positive for sleep disturbance.       The following portions of the patient's history were reviewed and updated as appropriate: allergies, current medications, past family history, past medical history, past social history, past surgical history and problem list.    Physical Exam:   Pulse 99   Ht 160 cm (62.99\")   Wt 92.1 kg (203 lb 0.7 oz)   SpO2 100%   BMI 35.98 kg/m²   GENERAL: Body habitus: obese    Lower extremity edema: Right: none; Left: none    Varicose veins:  Right: none; Left: none    Gait: " normal     Mental Status:  awake and alert; oriented to person, place, and time    Voice:  clear  SKIN:  Lower extremity: Normal and warm and dry    Hair Growth(lower extremity):  Right:normal; Left:  normal  NAILS: Toenails: normal  HEENT: Head: Normocephalic, atraumatic,  without obvious abnormality.  eye: normal external eye, no icterus  ears:normal external ears  nose: normal external nose  pharynx: dental hygiene adequate  PULM:  Repiratory effort normal  CV:  Dorsalis Pedis:  Right: 2+; Left:2+    Posterior Tibial: Right:2+; Left:2+    Capillary Refill:  Brisk  MSK:  Hand:right handed and sensation intact      Tibia:  Right:  non tender; Left:  non tender      Ankle:  Right: non tender, ROM  normal and symmetric and motor function  normal; Left:  non tender, ROM  normal and symmetric and motor function  normal      Foot:  Right:  Dislocated second metatarsal phalangeal joint, she is able to reduce it, slight flexion contracture at the second PIP joint, tender to palpation in the second metatarsal phalangeal joint, very prominent hallux valgus metatarsus primus varus but not tender over the bunion.  Flexible flat foot.  No laxity at the first TMT.  No other tenderness; Left:  Similar hallux valgus metatarsus primus varus with large angle, but no tenderness, no significant hammering of the second toe, no swelling      NEURO: Heel Walking:  Right:  normal; Left:  normal    Toe Walking:  Right:  limited ability, painful; Left:  normal     Moyock-Abundio 5.07 monofilament test: normal    Lower extremity sensation: intact     Reflexes:  Biceps:  Right:  1+; Left:  1+           Quads:  Right:  2+; Left:  2+           Ankle:  Right:  1+; Left:  1+      Calf Atrophy:none    Motor Function: all 5/5         Medical Decision Making    Data Review:   ordered and reviewed x-rays today    Assessment and Plan/ Diagnosis/Treatment options:   1. Acquired metatarsus varus of right foot  The right second hammertoe is  dislocatable at the second metatarsal phalangeal joint.  I explained bunions and hammertoes to her.  I drew a picture on her foot explaining the angles.  I explained that the dislocation is not a traumatic event, it occurs due to ligamentous loosening over time.  Essentially it is developmental.  I explained that you can have bunions in isolation, hammertoes in isolation and sometimes they occur together.  However in her case you could not correct the hammertoe without correcting the bunion.  I explained there is no room for the hammertoe to stay straight unless you correct the bunion.  In her case, she has a very large angle and laxity in the forefoot, and the surgery I would use for the bunion would be a fusion of the first MTPJ.  She would not be able to run after this.  She would like to pursue conservative treatment.  I would recommend splinting the second and third MTPJ's and I showed her how to do this with the tape or a 2 loop Budin splint.  And I would recommend a custom orthotic.  If that does not help enough she will return and we will discuss surgery.  She reports that the bunions do not hurt, so she would prefer to avoid surgical intervention especially fusion, I think that sensible.   2. Contracture of joint of right foot  As above              Radiology Ordered []  Radiology Reports Reviewed []      Radiology Images Reviewed []   Labs Reviewed []    Labs Ordered []   PCP Records Reviewed []    Provider Records Reviewed []    ER Records Reviewed []    Hospital Records Reviewed []    History Obtained From Family []    Phone conversation with Provider []    Records Requested []

## 2019-12-02 ENCOUNTER — OFFICE VISIT (OUTPATIENT)
Dept: FAMILY MEDICINE CLINIC | Facility: CLINIC | Age: 44
End: 2019-12-02

## 2019-12-02 VITALS
HEIGHT: 63 IN | WEIGHT: 200.4 LBS | TEMPERATURE: 97.7 F | BODY MASS INDEX: 35.51 KG/M2 | SYSTOLIC BLOOD PRESSURE: 120 MMHG | OXYGEN SATURATION: 97 % | HEART RATE: 80 BPM | DIASTOLIC BLOOD PRESSURE: 70 MMHG

## 2019-12-02 DIAGNOSIS — E78.2 MIXED HYPERLIPIDEMIA: ICD-10-CM

## 2019-12-02 DIAGNOSIS — E66.01 CLASS 2 SEVERE OBESITY DUE TO EXCESS CALORIES WITH SERIOUS COMORBIDITY AND BODY MASS INDEX (BMI) OF 36.0 TO 36.9 IN ADULT (HCC): ICD-10-CM

## 2019-12-02 DIAGNOSIS — K76.0 FATTY LIVER: ICD-10-CM

## 2019-12-02 DIAGNOSIS — M79.7 FIBROMYALGIA: ICD-10-CM

## 2019-12-02 DIAGNOSIS — I10 ESSENTIAL HYPERTENSION: ICD-10-CM

## 2019-12-02 DIAGNOSIS — R73.03 PREDIABETES: Primary | ICD-10-CM

## 2019-12-02 LAB — HBA1C MFR BLD: 5.1 %

## 2019-12-02 PROCEDURE — 83036 HEMOGLOBIN GLYCOSYLATED A1C: CPT | Performed by: FAMILY MEDICINE

## 2019-12-02 PROCEDURE — 99214 OFFICE O/P EST MOD 30 MIN: CPT | Performed by: FAMILY MEDICINE

## 2019-12-02 RX ORDER — MELOXICAM 15 MG/1
15 TABLET ORAL DAILY
Qty: 90 TABLET | Refills: 1 | Status: SHIPPED | OUTPATIENT
Start: 2019-12-02 | End: 2020-03-02 | Stop reason: SDUPTHER

## 2019-12-02 NOTE — PROGRESS NOTES
Subjective   Madalyn Villegas is a 44 y.o. female.     History of Present Illness   Mrs. Villegas presents today for routine follow-up on several chronic issues.    Since her last visit she has had consultation with Dr. Poon for eval of chronic foot pain and deformity.  She will be receiving her orthotics later this week.  Diagnosed with hammertoe with dislocation, bilateral hallux valgus (severe).  She has declined surgical correction.  Attempting taping and/or splinting.  Overall foot pain has improved.    She has chronic obesity (fatty liver as well) with previous successful lifestyle modification and weight loss.  Some regain over the last several months.  Weight down 3 pounds from last visit.  6 pounds from September.  Working toward qualifying for breast reduction due to chronic shoulder upper back and neck pain.      She has hypertension and hyperlipidemia, currently on chlorthalidone and atorvastatin, taking as prescribed denies side effects.  Working on lifestyle modification as above.    She has chronic diffuse pain due to fibromyalgia.  Currently managing with meloxicam, acetaminophen and Zanaflex as needed.    Has comorbid prediabetes which is recently been stable.  No polydipsia, polyuria.    The following portions of the patient's history were reviewed and updated as appropriate: allergies, current medications, past family history, past medical history, past social history, past surgical history and problem list.    Review of Systems   Constitutional: Positive for fatigue. Negative for diaphoresis and fever.   HENT: Positive for congestion and postnasal drip. Negative for mouth sores, rhinorrhea, sinus pain, sore throat and trouble swallowing.    Eyes: Negative for visual disturbance.   Respiratory: Negative for cough, shortness of breath and wheezing.    Cardiovascular: Negative for chest pain, palpitations and leg swelling.   Gastrointestinal: Negative for abdominal pain, diarrhea, nausea and  vomiting.   Endocrine: Positive for heat intolerance. Negative for polydipsia and polyuria.   Genitourinary: Negative for dysuria and hematuria.   Musculoskeletal: Positive for arthralgias, back pain and myalgias.   Skin: Negative for rash and wound.   Neurological: Positive for headaches. Negative for dizziness, tremors and weakness.   Hematological: Negative for adenopathy. Bruises/bleeds easily.   Psychiatric/Behavioral: Negative for confusion, dysphoric mood and sleep disturbance.   Pt's previous ROS reviewed and updated as indicated.       Objective    Vitals:    12/02/19 1456   BP: 120/70   Pulse: 80   Temp: 97.7 °F (36.5 °C)   SpO2: 97%     Body mass index is 35.5 kg/m².      12/02/19  1456   Weight: 90.9 kg (200 lb 6.4 oz)       Physical Exam   Constitutional: She is oriented to person, place, and time. She appears well-developed and well-nourished. She is cooperative. She does not appear ill. No distress.   obese   HENT:   Head: Normocephalic and atraumatic.   Mouth/Throat: Mucous membranes are normal. Mucous membranes are not dry.   Eyes: Conjunctivae and lids are normal. No scleral icterus. Right eye exhibits no nystagmus. Left eye exhibits no nystagmus.   Cardiovascular: Normal rate, regular rhythm, normal heart sounds and intact distal pulses. Exam reveals no gallop.   No murmur heard.  Pulmonary/Chest: Effort normal and breath sounds normal.     Vascular Status -  Her right foot exhibits no edema. Her left foot exhibits no edema.   Foot Structure and Biomechanics -  Her right foot exhibits hallux valgus.  Her left foot exhibits hallux valgus.  Neurological: She is alert and oriented to person, place, and time. She has normal strength. She displays no tremor. No sensory deficit. Gait normal.   Skin: Skin is warm and dry. No bruising and no rash noted.   Psychiatric: She has a normal mood and affect. Her speech is normal and behavior is normal. Judgment and thought content normal. Cognition and memory  are normal.   Good eye contact. Answers questions appropriately. Good personal hygiene and grooming.   Nursing note and vitals reviewed.  Pt's previous physical exam reviewed and updated as indicated.    Lab Results   Component Value Date    HGBA1C 5.1 12/02/2019    HGBA1C 5.1 03/15/2019    HGBA1C 5.5 08/24/2018     Lab Results   Component Value Date    CREATININE 0.81 09/19/2019     Lab Results   Component Value Date    WBC 9.7 09/19/2019    HGB 13.7 09/19/2019    HCT 40.4 09/19/2019    MCV 82 09/19/2019     09/19/2019       Lab Results   Component Value Date    GLUCOSE 96 11/09/2017    BUN 23 09/19/2019    CREATININE 0.81 09/19/2019    EGFRIFNONA 87 09/19/2019    EGFRIFAFRI 101 09/19/2019    BCR 28 (H) 09/19/2019    K 3.6 09/19/2019    CO2 23 09/19/2019    CALCIUM 10.1 09/19/2019    PROTENTOTREF 6.7 09/19/2019    ALBUMIN 4.7 09/19/2019    LABIL2 2.4 (H) 09/19/2019    AST 32 09/19/2019    ALT 42 (H) 09/19/2019       Lab Results   Component Value Date    CHOL 228 (H) 07/18/2016    CHLPL 219 (H) 05/24/2018    TRIG 291 (H) 05/24/2018    HDL 45 05/24/2018     (H) 05/24/2018       Lab Results   Component Value Date    TSH 1.160 05/24/2018       Assessment/Plan   Madalyn was seen today for right foot pain.    Diagnoses and all orders for this visit:    Prediabetes  -     POC Glycosylated Hemoglobin (Hb A1C)    Essential hypertension    Class 2 severe obesity due to excess calories with serious comorbidity and body mass index (BMI) of 36.0 to 36.9 in adult (CMS/[facility])    Fibromyalgia  -     meloxicam (MOBIC) 15 MG tablet; Take 1 tablet by mouth Daily.    Mixed hyperlipidemia    Fatty liver      Prediabetes stable with consistent lifestyle modifications.  Continue close monitoring.  Preventive measures reviewed.    Hypertension controlled continue chlorthalidone.    Hyperlipidemia with good tolerance of statin.    Chronic obesity association with fatty liver, hypertension, hyperlipidemia.  Continue medications  as above.  Encourage continuation of appropriate caloric restriction, appropriate daily activity.  Hold off on phentermine for now she is successfully losing weight on her own.    Fibromyalgia stable on meloxicam, acetaminophen and muscle relaxant as needed.  Feels she would benefit significantly from breast reduction.    Follow-up with orthopedics as scheduled.  Routine follow-up in 3 months, sooner as needed.  Patient was encouraged to keep me informed of any acute changes, lack of improvement, or any new concerning symptoms.  Pt is aware of reasons to seek emergent care.  Patient voiced understanding of all instructions and denied further questions.            Please note that portions of this note may have been completed with a voice recognition program. Efforts were made to edit the dictations, but occasionally words are mistranscribed.

## 2019-12-11 ENCOUNTER — OFFICE VISIT (OUTPATIENT)
Dept: RETAIL CLINIC | Facility: CLINIC | Age: 44
End: 2019-12-11

## 2019-12-11 VITALS
SYSTOLIC BLOOD PRESSURE: 112 MMHG | DIASTOLIC BLOOD PRESSURE: 74 MMHG | RESPIRATION RATE: 18 BRPM | BODY MASS INDEX: 35.96 KG/M2 | TEMPERATURE: 98.3 F | HEART RATE: 84 BPM | OXYGEN SATURATION: 98 % | WEIGHT: 203 LBS

## 2019-12-11 DIAGNOSIS — J01.40 ACUTE NON-RECURRENT PANSINUSITIS: Primary | ICD-10-CM

## 2019-12-11 DIAGNOSIS — J40 BRONCHITIS: ICD-10-CM

## 2019-12-11 PROCEDURE — 99213 OFFICE O/P EST LOW 20 MIN: CPT | Performed by: NURSE PRACTITIONER

## 2019-12-11 RX ORDER — CEFDINIR 300 MG/1
300 CAPSULE ORAL 2 TIMES DAILY
Qty: 20 CAPSULE | Refills: 0 | Status: SHIPPED | OUTPATIENT
Start: 2019-12-11 | End: 2019-12-21

## 2019-12-11 RX ORDER — BENZONATATE 200 MG/1
200 CAPSULE ORAL 3 TIMES DAILY PRN
Qty: 21 CAPSULE | Refills: 0 | Status: SHIPPED | OUTPATIENT
Start: 2019-12-11 | End: 2019-12-18

## 2019-12-11 RX ORDER — PREDNISONE 10 MG/1
TABLET ORAL DAILY
Qty: 21 EACH | Refills: 0 | Status: SHIPPED | OUTPATIENT
Start: 2019-12-11 | End: 2019-12-17

## 2019-12-11 RX ORDER — ALBUTEROL SULFATE 90 UG/1
2 AEROSOL, METERED RESPIRATORY (INHALATION) EVERY 4 HOURS PRN
Qty: 18 G | Refills: 0 | Status: SHIPPED | OUTPATIENT
Start: 2019-12-11

## 2019-12-11 NOTE — PROGRESS NOTES
URI      Subjective   Madalyn Villegas is a 44 y.o. female.     URI    This is a new problem. Episode onset: Sore throat for 1.5 weeks, worsening since Tuesday of last week. The problem has been gradually worsening. There has been no fever. Associated symptoms include congestion, coughing, headaches, rhinorrhea, a sore throat and wheezing (weekend ). Pertinent negatives include no abdominal pain, chest pain, diarrhea, ear pain, nausea, rash, sinus pain, sneezing or vomiting. Treatments tried: Mucinex, Sudafed. The treatment provided mild relief.    Has not had influenza vaccine.  See ROS.      Patient Active Problem List   Diagnosis   • Piriformis syndrome of right side   • Essential hypertension   • Class 2 obesity due to excess calories without serious comorbidity with body mass index (BMI) of 36.0 to 36.9 in adult   • Vitamin D deficiency   • Hyperlipidemia   • Abnormal echocardiogram   • Lumbar degenerative disc disease   • Arthritis of lumbar spine   • Fibromyalgia   • Prediabetes   • Rectal bleed   • Dysmenorrhea   • Menorrhagia with regular cycle   • Fatty liver   • Gallbladder polyp   • Acquired metatarsus varus of right foot   • Contracture of joint of right foot       No Known Allergies     Current Outpatient Medications on File Prior to Visit   Medication Sig Dispense Refill   • atorvastatin (LIPITOR) 10 MG tablet Take 1 tablet by mouth Every Night. 90 tablet 1   • chlorthalidone (HYGROTON) 25 MG tablet Take 1 tablet by mouth Daily. 90 tablet 1   • Cholecalciferol (VITAMIN D3) 2000 units tablet Take  by mouth.     • DYMISTA 137-50 MCG/ACT suspension      • esomeprazole (NexIUM) 20 MG capsule Take 20 mg by mouth 2 (Two) Times a Day.     • meloxicam (MOBIC) 15 MG tablet Take 1 tablet by mouth Daily. 90 tablet 1   • methylcellulose, Laxative, (CITRUCEL) 500 MG tablet tablet Take 2 tablets by mouth Every 4 (Four) Hours As Needed.     • montelukast (SINGULAIR) 10 MG tablet Take 10 mg by mouth Daily.     •  pseudoephedrine (SUDAFED) 30 MG tablet Take 30 mg by mouth Every 4 (Four) Hours As Needed for Congestion ([employer] BRAND OF SUDAFED).     • TiZANidine (ZANAFLEX) 6 MG capsule Take 1 capsule by mouth 3 (Three) Times a Day As Needed for Muscle Spasms. 270 capsule 1   • acetaminophen (TYLENOL) 500 MG tablet Take 500 mg by mouth every 6 (six) hours as needed for mild pain (1-3).     • EPINEPHrine (EPIPEN) 0.3 MG/0.3ML solution auto-injector injection      • glucosamine-chondroitin 500-400 MG capsule capsule Take 1 capsule by mouth Daily.     • [DISCONTINUED] albuterol (PROVENTIL HFA;VENTOLIN HFA) 108 (90 BASE) MCG/ACT inhaler Inhale 2 puffs every 4 (four) hours as needed for wheezing. 18 g 0   • [DISCONTINUED] ibuprofen (ADVIL,MOTRIN) 600 MG tablet ibuprofen 600 mg tablet       No current facility-administered medications on file prior to visit.        Past Medical History:   Diagnosis Date   • Abnormal EKG    • Asthma     ASTHMA LIKE SYMPTOMS WITH SINUS INFECTION   • Class 2 severe obesity due to excess calories with serious comorbidity and body mass index (BMI) of 35.0 to 35.9 in adult (CMS/[facility]) 7/18/2016   • GERD (gastroesophageal reflux disease)    • History of being tatooed     RIGHT ANKLE   • History of Papanicolaou smear of cervix 09/2017    [facility]       • Hyperlipidemia    • Hypertension    • Kidney stones    • Lumbar degenerative disc disease    • Migraine    • Tobacco abuse        Past Surgical History:   Procedure Laterality Date   • COLONOSCOPY N/A 8/24/2017    Procedure: COLONOSCOPY;  Surgeon: Blas Thapa MD;  [facility];  Service:    • D&C HYSTEROSCOPY ENDOMETRIAL ABLATION N/A 11/17/2017    Procedure: DILATATION AND CURETTAGE HYSTEROSCOPY NOVASURE ENDOMETRIAL ABLATION;  Surgeon: Marin Louie MD;  [facility];  Service:    • KNEE ACL RECONSTRUCTION Right 07/2010   • WISDOM TOOTH EXTRACTION         Family History   Problem Relation Age of Onset   • Arthritis  Mother    • Hyperlipidemia Mother    • Hypertension Mother    • Obesity Mother    • Heart attack Mother 64   • Hypertension Father    • Heart disease Father    • Heart attack Sister 34   • Hyperlipidemia Sister    • Diabetes Sister    • Hypertension Sister    • Obesity Sister    • Heart disease Sister    • Heart failure Sister    • Obesity Sister    • Diabetes Sister    • Diabetes type II Sister    • Stomach cancer Maternal Uncle    • Cancer Maternal Grandfather        Social History     Socioeconomic History   • Marital status: Significant Other     Spouse name: Not on file   • Number of children: Not on file   • Years of education: Not on file   • Highest education level: Not on file   Tobacco Use   • Smoking status: Former Smoker     Packs/day: 0.75     Years: 25.00     Pack years: 18.75     Types: Cigarettes     Last attempt to quit: 3/16/2019     Years since quittin.7   • Smokeless tobacco: Never Used   Substance and Sexual Activity   • Alcohol use: No   • Drug use: No   • Sexual activity: Not Currently     Partners: Male     Birth control/protection: Surgical, Abstinence       Travel:  No recent travel within the last 21 days outside the [place] Denies recent travel to one of the following [place]ries:  [place], [place], [place], or [place].  Denies contact with anyone who has traveled to one of the following [place]  Countries: [place], [place], [place], or [place] within the last 21 days and is known or suspected to have Ebola.  Denies having had any contact with the human remains, blood or any bodily fluids of someone who is known or suspected to have Ebola within the last 21 days.     OB History        0    Para   0    Term   0       0    AB   0    Living   0       SAB   0    TAB   0    Ectopic   0    Molar   0    Multiple   0    Live Births   0                Review of Systems   Constitutional: Positive for chills and fatigue. Negative for diaphoresis and fever.   HENT:  Positive for congestion, nosebleeds (when blowing ), postnasal drip, rhinorrhea, sinus pressure, sore throat and voice change. Negative for dental problem, ear discharge, ear pain, mouth sores, sinus pain and sneezing.    Respiratory: Positive for cough, chest tightness (last week ), shortness of breath (weekend ) and wheezing (weekend ).    Cardiovascular: Negative for chest pain.   Gastrointestinal: Negative for abdominal pain, diarrhea, nausea and vomiting.   Musculoskeletal: Negative for myalgias.   Skin: Negative for rash.   Neurological: Positive for headaches. Negative for dizziness.       /74 (BP Location: Right arm, Patient Position: Sitting, Cuff Size: Adult)   Pulse 84   Temp 98.3 °F (36.8 °C) (Temporal)   Resp 18   Wt 92.1 kg (203 lb)   LMP 11/01/2017 (Approximate)   SpO2 98%   Breastfeeding No   BMI 35.96 kg/m²     Objective   Physical Exam   Constitutional: She is oriented to person, place, and time. She appears well-developed and well-nourished. She is cooperative. She appears ill. No distress.   HENT:   Head: Normocephalic.   Right Ear: External ear and ear canal normal. Tympanic membrane is not injected, not scarred, not perforated, not erythematous, not retracted and not bulging. A middle ear effusion (serous ) is present.   Left Ear: External ear and ear canal normal. Tympanic membrane is not injected, not scarred, not perforated, not erythematous, not retracted and not bulging. A middle ear effusion (serous ) is present.   Nose: Mucosal edema and rhinorrhea (boggy ) present. Right sinus exhibits maxillary sinus tenderness and frontal sinus tenderness. Left sinus exhibits maxillary sinus tenderness and frontal sinus tenderness.   Mouth/Throat: Uvula is midline. Mucous membranes are dry. No posterior oropharyngeal edema or posterior oropharyngeal erythema. No tonsillar exudate.   Eyes: Conjunctivae, EOM and lids are normal.   Cardiovascular: Normal rate, regular rhythm and normal  heart sounds.   Pulmonary/Chest: Effort normal. No accessory muscle usage or stridor. No respiratory distress. She has no decreased breath sounds. She has wheezes (trace inspiratory scattered ). She has no rhonchi. She has no rales.   Frequent coarse cough    Lymphadenopathy:        Head (right side): Tonsillar adenopathy present. No submental, no submandibular, no preauricular, no posterior auricular and no occipital adenopathy present.        Head (left side): Tonsillar adenopathy present. No submental, no submandibular, no preauricular, no posterior auricular and no occipital adenopathy present.     She has no cervical adenopathy.   Neurological: She is alert and oriented to person, place, and time.   Skin: Skin is warm, dry and intact. No rash noted.   Psychiatric: She has a normal mood and affect. Her speech is normal and behavior is normal.       Assessment/Plan   Madalyn was seen today for uri.    Diagnoses and all orders for this visit:    Acute non-recurrent pansinusitis  -     cefdinir (OMNICEF) 300 MG capsule; Take 1 capsule by mouth 2 (Two) Times a Day for 10 days.    Bronchitis  -     predniSONE (DELTASONE) 10 MG (21) tablet pack; Take  by mouth Daily for 6 days.  -     albuterol sulfate  (90 Base) MCG/ACT inhaler; Inhale 2 puffs Every 4 (Four) Hours As Needed for Wheezing.  -     benzonatate (TESSALON) 200 MG capsule; Take 1 capsule by mouth 3 (Three) Times a Day As Needed for Cough for up to 7 days.        Return if symptoms worsen or fail to improve with PCP .

## 2019-12-11 NOTE — PATIENT INSTRUCTIONS
Acute Bronchitis, Adult  Acute bronchitis is when air tubes (bronchi) in the lungs suddenly get swollen. The condition can make it hard to breathe. It can also cause these symptoms:  · A cough.  · Coughing up clear, yellow, or green mucus.  · Wheezing.  · Chest congestion.  · Shortness of breath.  · A fever.  · Body aches.  · Chills.  · A sore throat.  Follow these instructions at home:    Medicines  · Take over-the-counter and prescription medicines only as told by your doctor.  · If you were prescribed an antibiotic medicine, take it as told by your doctor. Do not stop taking the antibiotic even if you start to feel better.  General instructions  · Rest.  · Drink enough fluids to keep your pee (urine) pale yellow.  · Avoid smoking and secondhand smoke. If you smoke and you need help quitting, ask your doctor. Quitting will help your lungs heal faster.  · Use an inhaler, cool mist vaporizer, or humidifier as told by your doctor.  · Keep all follow-up visits as told by your doctor. This is important.  How is this prevented?  To lower your risk of getting this condition again:  · Wash your hands often with soap and water. If you cannot use soap and water, use hand .  · Avoid contact with people who have cold symptoms.  · Try not to touch your hands to your mouth, nose, or eyes.  · Make sure to get the flu shot every year.  Contact a doctor if:  · Your symptoms do not get better in 2 weeks.  Get help right away if:  · You cough up blood.  · You have chest pain.  · You have very bad shortness of breath.  · You become dehydrated.  · You faint (pass out) or keep feeling like you are going to pass out.  · You keep throwing up (vomiting).  · You have a very bad headache.  · Your fever or chills gets worse.  This information is not intended to replace advice given to you by your health care provider. Make sure you discuss any questions you have with your health care provider.  Document Released: 06/05/2009 Document  Revised: 08/01/2018 Document Reviewed: 06/07/2017  [employer] Interactive Patient Education © 2019 [employer]  Sinusitis, Adult  Sinusitis is soreness and swelling (inflammation) of your sinuses. Sinuses are hollow spaces in the bones around your face. They are located:  · Around your eyes.  · In the middle of your forehead.  · Behind your nose.  · In your cheekbones.  Your sinuses and nasal passages are lined with a fluid called mucus. Mucus drains out of your sinuses. Swelling can trap mucus in your sinuses. This lets germs (bacteria, virus, or fungus) grow, which leads to infection. Most of the time, this condition is caused by a virus.  What are the causes?  This condition is caused by:  · Allergies.  · Asthma.  · Germs.  · Things that block your nose or sinuses.  · Growths in the nose (nasal polyps).  · Chemicals or irritants in the air.  · Fungus (rare).  What increases the risk?  You are more likely to develop this condition if:  · You have a weak body defense system (immune system).  · You do a lot of swimming or diving.  · You use nasal sprays too much.  · You smoke.  What are the signs or symptoms?  The main symptoms of this condition are pain and a feeling of pressure around the sinuses. Other symptoms include:  · Stuffy nose (congestion).  · Runny nose (drainage).  · Swelling and warmth in the sinuses.  · Headache.  · Toothache.  · A cough that may get worse at night.  · Mucus that collects in the throat or the back of the nose (postnasal drip).  · Being unable to smell and taste.  · Being very tired (fatigue).  · A fever.  · Sore throat.  · Bad breath.  How is this diagnosed?  This condition is diagnosed based on:  · Your symptoms.  · Your medical history.  · A physical exam.  · Tests to find out if your condition is short-term (acute) or long-term (chronic). Your doctor may:  ? Check your nose for growths (polyps).  ? Check your sinuses using a tool that has a light (endoscope).  ? Check for allergies  or germs.  ? Do imaging tests, such as an MRI or CT scan.  How is this treated?  Treatment for this condition depends on the cause and whether it is short-term or long-term.  · If caused by a virus, your symptoms should go away on their own within 10 days. You may be given medicines to relieve symptoms. They include:  ? Medicines that shrink swollen tissue in the nose.  ? Medicines that treat allergies (antihistamines).  ? A spray that treats swelling of the nostrils.   ? Rinses that help get rid of thick mucus in your nose (nasal saline washes).  · If caused by bacteria, your doctor may wait to see if you will get better without treatment. You may be given antibiotic medicine if you have:  ? A very bad infection.  ? A weak body defense system.  · If caused by growths in the nose, you may need to have surgery.  Follow these instructions at home:  Medicines  · Take, use, or apply over-the-counter and prescription medicines only as told by your doctor. These may include nasal sprays.  · If you were prescribed an antibiotic medicine, take it as told by your doctor. Do not stop taking the antibiotic even if you start to feel better.  Hydrate and humidify    · Drink enough water to keep your pee (urine) pale yellow.  · Use a cool mist humidifier to keep the humidity level in your home above 50%.  · Breathe in steam for 10-15 minutes, 3-4 times a day, or as told by your doctor. You can do this in the bathroom while a hot shower is running.  · Try not to spend time in cool or dry air.  Rest  · Rest as much as you can.  · Sleep with your head raised (elevated).  · Make sure you get enough sleep each night.  General instructions    · Put a warm, moist washcloth on your face 3-4 times a day, or as often as told by your doctor. This will help with discomfort.  · Wash your hands often with soap and water. If there is no soap and water, use hand .  · Do not smoke. Avoid being around people who are smoking (secondhand  smoke).  · Keep all follow-up visits as told by your doctor. This is important.  Contact a doctor if:  · You have a fever.  · Your symptoms get worse.  · Your symptoms do not get better within 10 days.  Get help right away if:  · You have a very bad headache.  · You cannot stop throwing up (vomiting).  · You have very bad pain or swelling around your face or eyes.  · You have trouble seeing.  · You feel confused.  · Your neck is stiff.  · You have trouble breathing.  Summary  · Sinusitis is swelling of your sinuses. Sinuses are hollow spaces in the bones around your face.  · This condition is caused by tissues in your nose that become inflamed or swollen. This traps germs. These can lead to infection.  · If you were prescribed an antibiotic medicine, take it as told by your doctor. Do not stop taking it even if you start to feel better.  · Keep all follow-up visits as told by your doctor. This is important.  This information is not intended to replace advice given to you by your health care provider. Make sure you discuss any questions you have with your health care provider.  Document Released: 06/05/2009 Document Revised: 05/20/2019 Document Reviewed: 05/20/2019  [employer] Interactive Patient Education © 2019 [employer]

## 2019-12-16 RX ORDER — TIZANIDINE HYDROCHLORIDE 6 MG/1
CAPSULE, GELATIN COATED ORAL
Qty: 270 CAPSULE | Refills: 0 | Status: SHIPPED | OUTPATIENT
Start: 2019-12-16 | End: 2020-01-27

## 2020-01-27 RX ORDER — TIZANIDINE HYDROCHLORIDE 6 MG/1
CAPSULE, GELATIN COATED ORAL
Qty: 270 CAPSULE | Refills: 0 | Status: SHIPPED | OUTPATIENT
Start: 2020-01-27 | End: 2020-08-03 | Stop reason: SDUPTHER

## 2020-03-02 ENCOUNTER — OFFICE VISIT (OUTPATIENT)
Dept: FAMILY MEDICINE CLINIC | Facility: CLINIC | Age: 45
End: 2020-03-02

## 2020-03-02 VITALS
TEMPERATURE: 97.8 F | WEIGHT: 202 LBS | OXYGEN SATURATION: 98 % | HEIGHT: 63 IN | BODY MASS INDEX: 35.79 KG/M2 | DIASTOLIC BLOOD PRESSURE: 76 MMHG | SYSTOLIC BLOOD PRESSURE: 116 MMHG | HEART RATE: 79 BPM

## 2020-03-02 DIAGNOSIS — R73.03 PREDIABETES: Primary | ICD-10-CM

## 2020-03-02 DIAGNOSIS — E78.49 OTHER HYPERLIPIDEMIA: ICD-10-CM

## 2020-03-02 DIAGNOSIS — I10 ESSENTIAL HYPERTENSION: ICD-10-CM

## 2020-03-02 DIAGNOSIS — M79.7 FIBROMYALGIA: ICD-10-CM

## 2020-03-02 PROCEDURE — 99214 OFFICE O/P EST MOD 30 MIN: CPT | Performed by: FAMILY MEDICINE

## 2020-03-02 RX ORDER — CHLORTHALIDONE 25 MG/1
25 TABLET ORAL DAILY
Qty: 90 TABLET | Refills: 1 | Status: SHIPPED | OUTPATIENT
Start: 2020-03-02 | End: 2020-08-03 | Stop reason: SDUPTHER

## 2020-03-02 RX ORDER — MELOXICAM 15 MG/1
15 TABLET ORAL DAILY
Qty: 90 TABLET | Refills: 1 | Status: SHIPPED | OUTPATIENT
Start: 2020-03-02 | End: 2020-08-03 | Stop reason: SDUPTHER

## 2020-03-02 RX ORDER — ATORVASTATIN CALCIUM 10 MG/1
10 TABLET, FILM COATED ORAL NIGHTLY
Qty: 90 TABLET | Refills: 1 | Status: SHIPPED | OUTPATIENT
Start: 2020-03-02 | End: 2020-08-03 | Stop reason: SDUPTHER

## 2020-03-02 NOTE — PROGRESS NOTES
Subjective   Madalyn Villegas is a 44 y.o. female.     History of Present Illness   Mrs. Villegas presents today for routine follow-up on several chronic medical problems.    She has essential hypertension for which she is currently on chlorthalidone.  Taking as prescribed.  Denies side effects.  Blood pressures have been well controlled.  She denies chest pain, shortness of breath, palpitations, swelling in extremities.  Working on improving diet to be more heart healthy.  She is exercising regularly.    She has hyperlipidemia for which she is currently on Lipitor.  Tolerating well.  Taking as prescribed.  Denies side effects.    She has chronic obesity in association with prediabetes.  Recent A1c improved/stable.    She has chronic diffuse myofascial pain due to fibromyalgia.  Currently not requiring medical management other than anti-inflammatory as needed, muscle relaxant as needed.  No recent flares of pain.    The following portions of the patient's history were reviewed and updated as appropriate: allergies, current medications, past family history, past medical history, past social history, past surgical history and problem list.    Review of Systems   Constitutional: Positive for fatigue. Negative for diaphoresis and fever.   HENT: Positive for congestion and postnasal drip. Negative for mouth sores, rhinorrhea, sinus pain, sore throat and trouble swallowing.    Eyes: Negative for visual disturbance.   Respiratory: Negative for cough, shortness of breath and wheezing.    Cardiovascular: Negative for chest pain, palpitations and leg swelling.   Gastrointestinal: Negative for abdominal pain, diarrhea, nausea and vomiting.   Endocrine: Positive for heat intolerance. Negative for polydipsia and polyuria.   Genitourinary: Negative for dysuria and hematuria.   Musculoskeletal: Positive for arthralgias, back pain and myalgias.   Skin: Negative for rash and wound.   Neurological: Positive for headaches. Negative for  dizziness, tremors and weakness.   Hematological: Negative for adenopathy. Bruises/bleeds easily.   Psychiatric/Behavioral: Negative for confusion, dysphoric mood and sleep disturbance.   Pt's previous ROS reviewed and updated as indicated.       Objective    Vitals:    03/02/20 0918   BP: 116/76   Pulse: 79   Temp: 97.8 °F (36.6 °C)   SpO2: 98%     Body mass index is 35.78 kg/m².      03/02/20 0918   Weight: 91.6 kg (202 lb)       Physical Exam   Constitutional: She is oriented to person, place, and time. She appears well-developed and well-nourished. She is cooperative. She does not appear ill. No distress.   obese   HENT:   Head: Normocephalic and atraumatic.   Mouth/Throat: Mucous membranes are normal. Mucous membranes are not dry.   Eyes: Conjunctivae and lids are normal. No scleral icterus. Right eye exhibits no nystagmus. Left eye exhibits no nystagmus.   Neck: Phonation normal. Neck supple. Normal carotid pulses present. Carotid bruit is not present. No thyromegaly present.   Cardiovascular: Normal rate, regular rhythm, normal heart sounds and intact distal pulses. Exam reveals no gallop.   No murmur heard.  Pulmonary/Chest: Effort normal and breath sounds normal.     Vascular Status -  Her right foot exhibits no edema. Her left foot exhibits no edema.  Lymphadenopathy:     She has no cervical adenopathy.   Neurological: She is alert and oriented to person, place, and time. She displays no tremor. Gait normal.   Skin: Skin is warm and dry. No bruising and no rash noted.   Psychiatric: She has a normal mood and affect. Her speech is normal and behavior is normal. Judgment and thought content normal. Cognition and memory are normal.   Good eye contact. Answers questions appropriately. Good personal hygiene and grooming.   Nursing note and vitals reviewed.  Pt's previous physical exam reviewed and updated as indicated.    Lab Results   Component Value Date    WBC 9.7 09/19/2019    HGB 13.7 09/19/2019    HCT  40.4 09/19/2019    MCV 82 09/19/2019     09/19/2019       Lab Results   Component Value Date    GLUCOSE 96 11/09/2017    BUN 23 09/19/2019    CREATININE 0.81 09/19/2019    EGFRIFNONA 87 09/19/2019    EGFRIFAFRI 101 09/19/2019    BCR 28 (H) 09/19/2019    K 3.6 09/19/2019    CO2 23 09/19/2019    CALCIUM 10.1 09/19/2019    PROTENTOTREF 6.7 09/19/2019    ALBUMIN 4.7 09/19/2019    LABIL2 2.4 (H) 09/19/2019    AST 32 09/19/2019    ALT 42 (H) 09/19/2019       Lab Results   Component Value Date    CHOL 228 (H) 07/18/2016    CHLPL 219 (H) 05/24/2018    TRIG 291 (H) 05/24/2018    HDL 45 05/24/2018     (H) 05/24/2018       Lab Results   Component Value Date    HGBA1C 5.1 12/02/2019       Lab Results   Component Value Date    TSH 1.160 05/24/2018           Assessment/Plan   Madalyn was seen today for hyperlipidemia, hypertension and prediabetes.    Diagnoses and all orders for this visit:    Prediabetes    Fibromyalgia  -     meloxicam (MOBIC) 15 MG tablet; Take 1 tablet by mouth Daily.    Essential hypertension  -     chlorthalidone (HYGROTON) 25 MG tablet; Take 1 tablet by mouth Daily.    Other hyperlipidemia  -     atorvastatin (LIPITOR) 10 MG tablet; Take 1 tablet by mouth Every Night.  -     Lipid Panel         Prediabetes improved/stable.  Preventive measures reviewed.    Fibromyalgia stable with use of NSAID and muscle relaxant as needed.  She is encouraged to continue regular exercise including yoga as this has improved her pain.    Hypertension controlled, continue chlorthalidone.  Hyperlipidemia with good tolerance of statin. Pt advised to eat a heart healthy diet and get regular aerobic exercise.    Routine f/u in 3 months, sooner as needed/instructed.  I will contact patient regarding test results and provide instructions regarding any necessary changes in plan of care.  Patient was encouraged to keep me informed of any acute changes, lack of improvement, or any new concerning symptoms.  Pt is aware of  reasons to seek emergent care.  Patient voiced understanding of all instructions and denied further questions.                Please note that portions of this note may have been completed with a voice recognition program. Efforts were made to edit the dictations, but occasionally words are mistranscribed.

## 2020-03-03 LAB
CHOLEST SERPL-MCNC: 163 MG/DL (ref 0–200)
HDLC SERPL-MCNC: 55 MG/DL (ref 40–60)
LDLC SERPL CALC-MCNC: 85 MG/DL (ref 0–100)
TRIGL SERPL-MCNC: 114 MG/DL (ref 0–150)
VLDLC SERPL CALC-MCNC: 22.8 MG/DL

## 2020-08-03 ENCOUNTER — OFFICE VISIT (OUTPATIENT)
Dept: FAMILY MEDICINE CLINIC | Facility: CLINIC | Age: 45
End: 2020-08-03

## 2020-08-03 VITALS
DIASTOLIC BLOOD PRESSURE: 62 MMHG | WEIGHT: 228 LBS | RESPIRATION RATE: 14 BRPM | OXYGEN SATURATION: 98 % | HEART RATE: 92 BPM | HEIGHT: 63 IN | SYSTOLIC BLOOD PRESSURE: 112 MMHG | BODY MASS INDEX: 40.4 KG/M2 | TEMPERATURE: 97.8 F

## 2020-08-03 DIAGNOSIS — M79.7 FIBROMYALGIA: ICD-10-CM

## 2020-08-03 DIAGNOSIS — R11.0 POSTPRANDIAL NAUSEA: ICD-10-CM

## 2020-08-03 DIAGNOSIS — Z51.81 MEDICATION MONITORING ENCOUNTER: ICD-10-CM

## 2020-08-03 DIAGNOSIS — E78.49 OTHER HYPERLIPIDEMIA: ICD-10-CM

## 2020-08-03 DIAGNOSIS — I10 ESSENTIAL HYPERTENSION: Primary | ICD-10-CM

## 2020-08-03 DIAGNOSIS — K76.0 FATTY LIVER: ICD-10-CM

## 2020-08-03 DIAGNOSIS — K52.9 POSTPRANDIAL DIARRHEA: ICD-10-CM

## 2020-08-03 DIAGNOSIS — R73.03 PREDIABETES: ICD-10-CM

## 2020-08-03 PROCEDURE — 99214 OFFICE O/P EST MOD 30 MIN: CPT | Performed by: FAMILY MEDICINE

## 2020-08-03 RX ORDER — MELOXICAM 15 MG/1
15 TABLET ORAL DAILY
Qty: 90 TABLET | Refills: 1 | Status: SHIPPED | OUTPATIENT
Start: 2020-08-03 | End: 2021-05-06

## 2020-08-03 RX ORDER — LOPERAMIDE HYDROCHLORIDE 2 MG/1
2 TABLET ORAL 4 TIMES DAILY PRN
Qty: 60 TABLET | Refills: 0 | Status: SHIPPED | OUTPATIENT
Start: 2020-08-03 | End: 2020-08-19

## 2020-08-03 RX ORDER — ATORVASTATIN CALCIUM 10 MG/1
10 TABLET, FILM COATED ORAL NIGHTLY
Qty: 90 TABLET | Refills: 1 | Status: SHIPPED | OUTPATIENT
Start: 2020-08-03 | End: 2021-05-05 | Stop reason: SDUPTHER

## 2020-08-03 RX ORDER — PROMETHAZINE HYDROCHLORIDE 25 MG/1
25 TABLET ORAL EVERY 8 HOURS PRN
Qty: 21 TABLET | Refills: 0 | Status: SHIPPED | OUTPATIENT
Start: 2020-08-03 | End: 2020-08-19

## 2020-08-03 RX ORDER — CHLORTHALIDONE 25 MG/1
25 TABLET ORAL DAILY
Qty: 90 TABLET | Refills: 1 | Status: SHIPPED | OUTPATIENT
Start: 2020-08-03 | End: 2021-02-04 | Stop reason: SINTOL

## 2020-08-03 RX ORDER — TIZANIDINE HYDROCHLORIDE 6 MG/1
6 CAPSULE, GELATIN COATED ORAL 3 TIMES DAILY
Qty: 270 CAPSULE | Refills: 1 | Status: SHIPPED | OUTPATIENT
Start: 2020-08-03 | End: 2021-03-15

## 2020-08-03 NOTE — PROGRESS NOTES
"Subjective   Madalyn Villegas is a 45 y.o. female.     History of Present Illness   Mrs. Villegas presents today for routine f/u on several chronic med problems.    She has HTN, currently on chlorthalidone. Taking as rx'd. Denies side effects. BP well controlled, also helping swelling.    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 Currently on mobic and zanaflex. particpates in regular low intensity exercise. Some difficulty with closing of gyms due to COVID.    Has chronic obesity with preDM, HLP, fatty liver. Struggling with weight gain during COVID, out of normal routine.    She c/o worsening postprandial diarrhea, and nausea. Gallbladder intact. Previous BG US showed possible polyp. She she c/o increased gas, nausea, occ vomiting/dry heaves. Also feels food \"gets stuck\" in lower chest areea. On PPI for heartburn/reflux. If she misses PPI, she \"can't eat\".    Sister with IBSx. Father with h/o esophageal stricture requiring dilation. No family h/o Sjogrens.    She also c/o bilateral pelvic pain. Usually \"one side or the other\". She is s/p ablation but has intact uterus and ovaries. Has symptoms of \"PMS\" cyclically. She also c/o \"Vagina popping out x 1 year\". Diarrhea previously was worse with menses, ovulation. She is overdue for cervical cancer screening.      The following portions of the patient's history were reviewed and updated as appropriate: allergies, current medications, past family history, past medical history, past social history, past surgical history and problem list.    Review of Systems   Constitutional: Positive for fatigue and unexpected weight change. Negative for diaphoresis and fever.   HENT: Positive for congestion, postnasal drip and trouble swallowing. Negative for mouth sores, rhinorrhea, sinus pain and sore throat.    Eyes: Negative for visual disturbance.   Respiratory: Negative for cough, shortness of breath and wheezing.    Cardiovascular: Negative for chest pain, palpitations and leg " swelling.   Gastrointestinal: Positive for abdominal pain, diarrhea, nausea and vomiting. Negative for blood in stool.        Heartburn   Endocrine: Positive for heat intolerance. Negative for polydipsia and polyuria.   Genitourinary: Positive for pelvic pain. Negative for dysuria, hematuria, vaginal bleeding and vaginal discharge.   Musculoskeletal: Positive for arthralgias, back pain and myalgias.   Skin: Negative for rash and wound.   Neurological: Positive for headaches. Negative for dizziness, tremors and weakness.   Hematological: Negative for adenopathy. Bruises/bleeds easily.   Psychiatric/Behavioral: Negative for confusion, dysphoric mood and sleep disturbance.   Pt's previous ROS reviewed and updated as indicated.       Objective    Vitals:    08/03/20 0953   BP: 112/62   Pulse: 92   Resp: 14   Temp: 97.8 °F (36.6 °C)   SpO2: 98%     Body mass index is 40.39 kg/m².      08/03/20  0953   Weight: 103 kg (228 lb)       Physical Exam   Constitutional: She is oriented to person, place, and time. She appears well-developed and well-nourished. She is cooperative. She does not appear ill. No distress.   Morbidly obese   HENT:   Head: Normocephalic and atraumatic.   Eyes: Conjunctivae are normal. No scleral icterus. Right eye exhibits no nystagmus. Left eye exhibits no nystagmus.   Neck: Phonation normal. Neck supple. Normal carotid pulses present. Carotid bruit is not present. No thyromegaly present.   Cardiovascular: Normal rate, regular rhythm, normal heart sounds and intact distal pulses. Exam reveals no gallop.   No murmur heard.  Pulmonary/Chest: Effort normal and breath sounds normal.   Abdominal: Soft. She exhibits no distension and no mass (exam limited by body habitus). Bowel sounds are increased. There is hepatosplenomegaly (exam limited by body habitus). There is tenderness (moderate) in the right upper quadrant and epigastric area. There is no rigidity, no rebound and no guarding.   Mild RLQ,  suprapubic, and LLQ pain     Vascular Status -  Her right foot exhibits no edema. Her left foot exhibits no edema.  Lymphadenopathy:     She has no cervical adenopathy.   Neurological: She is alert and oriented to person, place, and time. She displays no tremor. Gait normal.   Skin: Skin is warm and dry. No bruising and no rash noted.   No jaundice   Psychiatric: She has a normal mood and affect. Her speech is normal and behavior is normal. Judgment and thought content normal. Cognition and memory are normal.   Good eye contact. Answers questions appropriately. Good personal hygiene and grooming.   Nursing note and vitals reviewed.  Pt's previous physical exam reviewed and updated as indicated.        Assessment/Plan   Madalyn was seen today for hyperlipidemia, hypertension and fibromyalgia.    Diagnoses and all orders for this visit:    Essential hypertension  -     chlorthalidone (HYGROTON) 25 MG tablet; Take 1 tablet by mouth Daily.  -     CBC (No Diff)  -     TSH Rfx On Abnormal To Free T4    Fibromyalgia  -     meloxicam (MOBIC) 15 MG tablet; Take 1 tablet by mouth Daily.    Other hyperlipidemia  -     atorvastatin (LIPITOR) 10 MG tablet; Take 1 tablet by mouth Every Night.  -     Lipid Panel    Prediabetes  -     Hemoglobin A1c    Fatty liver    Medication monitoring encounter  -     CBC (No Diff)  -     Comprehensive Metabolic Panel    Postprandial diarrhea  -     CBC (No Diff)  -     Comprehensive Metabolic Panel  -     Amylase  -     Lipase  -     US Gallbladder; Future  -     Ambulatory Referral to General Surgery    Postprandial nausea  -     CBC (No Diff)  -     Comprehensive Metabolic Panel  -     Amylase  -     Lipase  -     US Gallbladder; Future  -     Ambulatory Referral to General Surgery    Other orders  -     TiZANidine (ZANAFLEX) 6 MG capsule; Take 1 capsule by mouth 3 (Three) Times a Day.  -     loperamide (Imodium A-D) 2 MG tablet; Take 1 tablet by mouth 4 (Four) Times a Day As Needed for  Diarrhea.  -     promethazine (PHENERGAN) 25 MG tablet; Take 1 tablet by mouth Every 8 (Eight) Hours As Needed for Nausea or Vomiting.       HTN controlled. Continue chlorthalidone. Pt advised to eat a heart healthy diet and get regular aerobic exercise.    HLP of unknown status, FLP as above. Continue statin.    FMSx stable on mobic and muscle relaxant. Continue low impact exercise.    Chronic obesity with pre-DM, fatty liver. Nutrition and activity goals reviewed including: mainly water to drink, limit white flour/processed sugar, higher lean protein, high fiber carbs, regular meals, working toward 150 mins cardio per week, resistance training 2x/week.    Suspect gallbladder dysfunction, GERD with possible gastritis/esophagitis/stricture. GB US as above, refer to gen surgery for possible upper/lower endoscopy. [place] diet and adequate fluids for now.    Refer to GYN for possible TVUS, updating pap smear, etc.    Routine f/u in 3 months, sooner as needed/instructed.  I will contact patient regarding test results and provide instructions regarding any necessary changes in plan of care.  Patient was encouraged to keep me informed of any acute changes, lack of improvement, or any new concerning symptoms.  Pt is aware of reasons to seek emergent care.  Patient voiced understanding of all instructions and denied further questions.

## 2020-08-04 LAB
ALBUMIN SERPL-MCNC: 4.7 G/DL (ref 3.5–5.2)
ALBUMIN/GLOB SERPL: 2.6 G/DL
ALP SERPL-CCNC: 62 U/L (ref 39–117)
ALT SERPL-CCNC: 29 U/L (ref 1–33)
AMYLASE SERPL-CCNC: 60 U/L (ref 28–100)
AST SERPL-CCNC: 23 U/L (ref 1–32)
BILIRUB SERPL-MCNC: 0.3 MG/DL (ref 0–1.2)
BUN SERPL-MCNC: 19 MG/DL (ref 6–20)
BUN/CREAT SERPL: 29.2 (ref 7–25)
CALCIUM SERPL-MCNC: 10.5 MG/DL (ref 8.6–10.5)
CHLORIDE SERPL-SCNC: 99 MMOL/L (ref 98–107)
CHOLEST SERPL-MCNC: 205 MG/DL (ref 0–200)
CO2 SERPL-SCNC: 27.1 MMOL/L (ref 22–29)
CREAT SERPL-MCNC: 0.65 MG/DL (ref 0.57–1)
ERYTHROCYTE [DISTWIDTH] IN BLOOD BY AUTOMATED COUNT: 12.6 % (ref 12.3–15.4)
GLOBULIN SER CALC-MCNC: 1.8 GM/DL
GLUCOSE SERPL-MCNC: 103 MG/DL (ref 65–99)
HBA1C MFR BLD: 5.62 % (ref 4.8–5.6)
HCT VFR BLD AUTO: 39.1 % (ref 34–46.6)
HDLC SERPL-MCNC: 53 MG/DL (ref 40–60)
HGB BLD-MCNC: 13.1 G/DL (ref 12–15.9)
LDLC SERPL CALC-MCNC: 121 MG/DL (ref 0–100)
LIPASE SERPL-CCNC: 26 U/L (ref 13–60)
MCH RBC QN AUTO: 27 PG (ref 26.6–33)
MCHC RBC AUTO-ENTMCNC: 33.5 G/DL (ref 31.5–35.7)
MCV RBC AUTO: 80.5 FL (ref 79–97)
PLATELET # BLD AUTO: 278 10*3/MM3 (ref 140–450)
POTASSIUM SERPL-SCNC: 3.7 MMOL/L (ref 3.5–5.2)
PROT SERPL-MCNC: 6.5 G/DL (ref 6–8.5)
RBC # BLD AUTO: 4.86 10*6/MM3 (ref 3.77–5.28)
SODIUM SERPL-SCNC: 140 MMOL/L (ref 136–145)
TRIGL SERPL-MCNC: 157 MG/DL (ref 0–150)
TSH SERPL DL<=0.005 MIU/L-ACNC: 1.79 UIU/ML (ref 0.27–4.2)
VLDLC SERPL CALC-MCNC: 31.4 MG/DL
WBC # BLD AUTO: 9.26 10*3/MM3 (ref 3.4–10.8)

## 2020-08-12 ENCOUNTER — HOSPITAL ENCOUNTER (OUTPATIENT)
Dept: ULTRASOUND IMAGING | Facility: HOSPITAL | Age: 45
Discharge: HOME | End: 2020-08-12
Admitting: FAMILY MEDICINE

## 2020-08-12 DIAGNOSIS — R11.0 POSTPRANDIAL NAUSEA: ICD-10-CM

## 2020-08-12 DIAGNOSIS — K52.9 POSTPRANDIAL DIARRHEA: ICD-10-CM

## 2020-08-12 PROCEDURE — 76705 ECHO EXAM OF ABDOMEN: CPT

## 2020-08-17 NOTE — H&P (VIEW-ONLY)
"Subjective   Madalyn Villegas is a 45 y.o. female.   Chief Complaint   Patient presents with   • Establish Care     Feels like food is getting stuck    • Nausea   • Vomiting   • Diarrhea     one week constipation the next    • Abdominal Pain       History of Present Illness   Ms. Villegas is a 45-year-old female patient who comes the office today for evaluation of abdominal pain, nausea, vomiting, and diarrhea.  She complains of postprandial nausea and vomiting associated with the pain.  She also reports that the symptoms are progressively worsening.  She incidentally reports that she has been on a low-carb diet for the last 2 years and initially lost about 70 pounds.  Her highest weight was 250 pounds and her lowest weight was 180 pounds.  She then quit smoking, and she has gained approximately 25 pounds since December.  She feels like she has discomfort no matter what she eats.  She has been eating a fairly moderate to high fat diet due to the low-carb nature of her eating plan.  She at this point feels that nausea and vomiting are her most persistent complaint.  She was told by her PCP that she may benefit from an upper endoscopy.  Upon further discussion of her symptoms, she does report that she feels like food \"will not go down.\"  She also feels like it occasionally \"gets stuck.\"  She does not have any symptoms of GERD particularly. She is on Nexium twice daily with no improvement in her symptoms.  She recently had an ultrasound which demonstrated fatty infiltration of the liver with a reported 8 mm polyp in the body of the gallbladder without biliary ductal dilatation or obvious shadowing stones.      The following portions of the patient's history were reviewed and updated as appropriate: allergies, current medications, past family history, past medical history, past social history, past surgical history and problem list.    Patient Active Problem List   Diagnosis   • Piriformis syndrome of right side   • " Essential hypertension   • Class 2 obesity due to excess calories without serious comorbidity with body mass index (BMI) of 36.0 to 36.9 in adult   • Vitamin D deficiency   • Hyperlipidemia   • Abnormal echocardiogram   • Lumbar degenerative disc disease   • Arthritis of lumbar spine   • Fibromyalgia   • Prediabetes   • Rectal bleed   • Dysmenorrhea   • Menorrhagia with regular cycle   • Fatty liver   • Gallbladder polyp   • Acquired metatarsus varus of right foot   • Contracture of joint of right foot   • Recurrent biliary colic   • Non-intractable vomiting   • Epigastric pain       Past Medical History:   Diagnosis Date   • Abnormal EKG     Patient states that she saw a cardiologist for previous abnormal EKG, had a cardiac stress test that was normal, and the cardiologist told her that her EKG was abnormal because of her large breasts.  Patient states that she cannot remember the name of the cardiologist.   • Asthma     ASTHMA LIKE SYMPTOMS WITH SINUS INFECTION   • Class 2 severe obesity due to excess calories with serious comorbidity and body mass index (BMI) of 35.0 to 35.9 in adult (CMS/[facility]) 7/18/2016   • GERD (gastroesophageal reflux disease)    • History of being tatooed     RIGHT ANKLE   • History of Papanicolaou smear of cervix 09/2017    [facility]       • Hyperlipidemia    • Hypertension    • Kidney stones    • Lumbar degenerative disc disease    • Migraine    • Piercing     bilateral ears   • Tobacco abuse        Past Surgical History:   Procedure Laterality Date   • COLONOSCOPY N/A 8/24/2017    Procedure: COLONOSCOPY;  Surgeon: Blas Thapa MD;  Location: [facility] ENDOSCOPY;  Service:    • D&C HYSTEROSCOPY ENDOMETRIAL ABLATION N/A 11/17/2017    Procedure: DILATATION AND CURETTAGE HYSTEROSCOPY NOVASURE ENDOMETRIAL ABLATION;  Surgeon: Marin Louie MD;  Location: [facility] OR;  Service:    • KNEE ACL RECONSTRUCTION Right 07/2010   • WISDOM TOOTH EXTRACTION         Medications:   No current  facility-administered medications for this visit.   No current outpatient medications on file.    Facility-Administered Medications Ordered in Other Visits:   •  ampicillin-sulbactam (UNASYN) 3 g in sodium chloride 0.9 % 100 mL IVPB-MBP, 3 g, Intravenous, Once, Amy Lowe MD  •  lactated ringers infusion, 50 mL/hr, Intravenous, Continuous, Amy Lowe MD, Last Rate: 50 mL/hr at 20 1130, 50 mL/hr at 20 1130  •  sodium chloride 0.9 % flush 10 mL, 10 mL, Intravenous, PRN, Amy Lowe MD  •  sodium chloride 0.9 % flush 3 mL, 3 mL, Intravenous, Q12H, Amy Lowe MD    Allergies:   No Known Allergies      Family History   Problem Relation Age of Onset   • Arthritis Mother    • Hyperlipidemia Mother    • Hypertension Mother    • Obesity Mother    • Heart attack Mother 64   • Hypertension Father    • Heart disease Father    • Heart attack Sister 34   • Hyperlipidemia Sister    • Diabetes Sister    • Hypertension Sister    • Obesity Sister    • Heart disease Sister    • Heart failure Sister    • Obesity Sister    • Diabetes Sister    • Diabetes type II Sister    • Stomach cancer Maternal Uncle    • Cancer Maternal Grandfather        Social History     Socioeconomic History   • Marital status: Significant Other     Spouse name: Not on file   • Number of children: Not on file   • Years of education: Not on file   • Highest education level: Not on file   Tobacco Use   • Smoking status: Former Smoker     Packs/day: 0.75     Years: 25.00     Pack years: 18.75     Types: Cigarettes     Last attempt to quit: 3/16/2019     Years since quittin.4   • Smokeless tobacco: Never Used   Substance and Sexual Activity   • Alcohol use: No   • Drug use: No   • Sexual activity: Defer     Partners: Male     Birth control/protection: Surgical       Review of Systems   Constitutional: Positive for activity change, appetite change, fatigue and unexpected weight change. Negative for chills and fever.   HENT:  "Positive for trouble swallowing. Negative for hearing loss and voice change.    Eyes: Negative for visual disturbance.   Respiratory: Negative for apnea, cough, chest tightness, shortness of breath and wheezing.    Cardiovascular: Negative for chest pain, palpitations and leg swelling.   Gastrointestinal: Positive for abdominal pain, constipation, diarrhea, nausea and vomiting. Negative for abdominal distention, anal bleeding, blood in stool and rectal pain.   Endocrine: Negative for cold intolerance and heat intolerance.   Genitourinary: Negative for difficulty urinating, dysuria and flank pain.   Musculoskeletal: Negative for back pain and gait problem.   Skin: Negative for color change, rash and wound.   Neurological: Negative for dizziness, syncope, speech difficulty, weakness, light-headedness, numbness and headaches.   Hematological: Negative for adenopathy. Does not bruise/bleed easily.   Psychiatric/Behavioral: Negative for confusion. The patient is not nervous/anxious.        Objective    /80   Pulse 85   Temp 98 °F (36.7 °C)   Ht 160 cm (63\")   Wt 103 kg (228 lb)   SpO2 98%   Breastfeeding No   BMI 40.39 kg/m²     Physical Exam   Constitutional: She is oriented to person, place, and time. She appears well-developed and well-nourished.   HENT:   Head: Normocephalic and atraumatic.   Eyes: Pupils are equal, round, and reactive to light. EOM are normal. No scleral icterus.   Cardiovascular: Regular rhythm.   Pulmonary/Chest: Effort normal.   Abdominal: Soft. She exhibits no distension. There is tenderness in the right upper quadrant and epigastric area.   Neurological: She is alert and oriented to person, place, and time.   Skin: Skin is warm and dry.   Psychiatric: She has a normal mood and affect. Her behavior is normal.     Outside Records:  I have taken the opportunity to review the patient's available outside records in paper format, scanned format and those available on Care " Everywhere    Results Review:  I have reviewed the entirety of the patient's clinical lab results.  I have also personally reviewed the relevant patient imaging.     PROCEDURE: US GALLBLADDER-     HISTORY: postprandial nausea and diarrhea; K52.9-Noninfective  gastroenteritis and colitis, unspecified; R11.0-Nausea     PROCEDURE: Ultrasound images of the right upper quadrant were obtained.     COMPARISON: 06/18/2018     FINDINGS: Limited images of the pancreas are unremarkable. The liver  parenchyma is echogenic consistent with fatty infiltration. No focal  liver lesions are identified. There is an 8 mm polyp in the body of the  gallbladder. No shadowing stones are evident.  There is no  pericholecystic fluid.  The common duct measures 2.90 mm . Limited  images of the right kidney are unremarkable.     IMPRESSION:  1. Fatty infiltration of the liver.  2. 8 mm polyp in the body of the gallbladder with no shadowing stones or  biliary dilatation.     This report was finalized on 8/12/2020 10:27 AM by Whitney Ashby M.D..        Assessment/Plan   Madalyn was seen today for establish care, nausea, vomiting, diarrhea and abdominal pain.    Diagnoses and all orders for this visit:    Gallbladder polyp  -     Case Request; Standing  -     Case Request    Recurrent biliary colic  -     Case Request; Standing  -     Case Request    Non-intractable vomiting with nausea, unspecified vomiting type  -     Case Request; Standing  -     Case Request    Epigastric pain  -     Case Request; Standing  -     Case Request    Other orders  -     Follow Anesthesia Guidelines / Standing Orders; Future  -     Provide NPO Instructions to Patient; Future  -     Chlorhexidine Skin Prep; Future  -     Obtain Informed Consent      I had a detailed discussion with patient regarding her symptoms, and her imaging work-up.  It is my opinion that the reported gallbladder polyp on ultrasound is more likely a nonmobile stone, and she is having  recurrent episodes of biliary colic as the source of her difficulties.  We discussed the option of laparoscopic cholecystectomy for definitive management of biliary colic.  As an alternative we discussed proceeding with upper endoscopy initially to evaluate her epigastric pain, vomiting, and sensation of dysphasia.  I do not think that this is the sole source of her difficulties.  I truly think that she will benefit from cholecystectomy.  As the last alternative, we discussed a simultaneous lap filipe with EGD.  We discussed risks, benefits, and alternatives to both procedures.  She wishes to proceed with these simultaneously.  We discussed the order in which these will be performed, and the increased risk of abdominal discomfort postoperatively due to concurrent procedures.  She verbalized understanding of this and wished to proceed.  My office will arrange scheduling, and preadmission testing.

## 2020-08-17 NOTE — PROGRESS NOTES
"Subjective   Madalyn Villegas is a 45 y.o. female.   Chief Complaint   Patient presents with   • Establish Care     Feels like food is getting stuck    • Nausea   • Vomiting   • Diarrhea     one week constipation the next    • Abdominal Pain       History of Present Illness   Ms. Villegas is a 45-year-old female patient who comes the office today for evaluation of abdominal pain, nausea, vomiting, and diarrhea.  She complains of postprandial nausea and vomiting associated with the pain.  She also reports that the symptoms are progressively worsening.  She incidentally reports that she has been on a low-carb diet for the last 2 years and initially lost about 70 pounds.  Her highest weight was 250 pounds and her lowest weight was 180 pounds.  She then quit smoking, and she has gained approximately 25 pounds since December.  She feels like she has discomfort no matter what she eats.  She has been eating a fairly moderate to high fat diet due to the low-carb nature of her eating plan.  She at this point feels that nausea and vomiting are her most persistent complaint.  She was told by her PCP that she may benefit from an upper endoscopy.  Upon further discussion of her symptoms, she does report that she feels like food \"will not go down.\"  She also feels like it occasionally \"gets stuck.\"  She does not have any symptoms of GERD particularly. She is on Nexium twice daily with no improvement in her symptoms.  She recently had an ultrasound which demonstrated fatty infiltration of the liver with a reported 8 mm polyp in the body of the gallbladder without biliary ductal dilatation or obvious shadowing stones.      The following portions of the patient's history were reviewed and updated as appropriate: allergies, current medications, past family history, past medical history, past social history, past surgical history and problem list.    Patient Active Problem List   Diagnosis   • Piriformis syndrome of right side   • " Essential hypertension   • Class 2 obesity due to excess calories without serious comorbidity with body mass index (BMI) of 36.0 to 36.9 in adult   • Vitamin D deficiency   • Hyperlipidemia   • Abnormal echocardiogram   • Lumbar degenerative disc disease   • Arthritis of lumbar spine   • Fibromyalgia   • Prediabetes   • Rectal bleed   • Dysmenorrhea   • Menorrhagia with regular cycle   • Fatty liver   • Gallbladder polyp   • Acquired metatarsus varus of right foot   • Contracture of joint of right foot   • Recurrent biliary colic   • Non-intractable vomiting   • Epigastric pain       Past Medical History:   Diagnosis Date   • Abnormal EKG     Patient states that she saw a cardiologist for previous abnormal EKG, had a cardiac stress test that was normal, and the cardiologist told her that her EKG was abnormal because of her large breasts.  Patient states that she cannot remember the name of the cardiologist.   • Asthma     ASTHMA LIKE SYMPTOMS WITH SINUS INFECTION   • Class 2 severe obesity due to excess calories with serious comorbidity and body mass index (BMI) of 35.0 to 35.9 in adult (CMS/[facility]) 7/18/2016   • GERD (gastroesophageal reflux disease)    • History of being tatooed     RIGHT ANKLE   • History of Papanicolaou smear of cervix 09/2017    [facility]       • Hyperlipidemia    • Hypertension    • Kidney stones    • Lumbar degenerative disc disease    • Migraine    • Piercing     bilateral ears   • Tobacco abuse        Past Surgical History:   Procedure Laterality Date   • COLONOSCOPY N/A 8/24/2017    Procedure: COLONOSCOPY;  Surgeon: Blas Thapa MD;  Location: [facility] ENDOSCOPY;  Service:    • D&C HYSTEROSCOPY ENDOMETRIAL ABLATION N/A 11/17/2017    Procedure: DILATATION AND CURETTAGE HYSTEROSCOPY NOVASURE ENDOMETRIAL ABLATION;  Surgeon: Marin Louie MD;  Location: [facility] OR;  Service:    • KNEE ACL RECONSTRUCTION Right 07/2010   • WISDOM TOOTH EXTRACTION         Medications:   No current  facility-administered medications for this visit.   No current outpatient medications on file.    Facility-Administered Medications Ordered in Other Visits:   •  ampicillin-sulbactam (UNASYN) 3 g in sodium chloride 0.9 % 100 mL IVPB-MBP, 3 g, Intravenous, Once, Amy Lowe MD  •  lactated ringers infusion, 50 mL/hr, Intravenous, Continuous, Amy Lowe MD, Last Rate: 50 mL/hr at 20 1130, 50 mL/hr at 20 1130  •  sodium chloride 0.9 % flush 10 mL, 10 mL, Intravenous, PRN, Amy Lowe MD  •  sodium chloride 0.9 % flush 3 mL, 3 mL, Intravenous, Q12H, Amy Lowe MD    Allergies:   No Known Allergies      Family History   Problem Relation Age of Onset   • Arthritis Mother    • Hyperlipidemia Mother    • Hypertension Mother    • Obesity Mother    • Heart attack Mother 64   • Hypertension Father    • Heart disease Father    • Heart attack Sister 34   • Hyperlipidemia Sister    • Diabetes Sister    • Hypertension Sister    • Obesity Sister    • Heart disease Sister    • Heart failure Sister    • Obesity Sister    • Diabetes Sister    • Diabetes type II Sister    • Stomach cancer Maternal Uncle    • Cancer Maternal Grandfather        Social History     Socioeconomic History   • Marital status: Significant Other     Spouse name: Not on file   • Number of children: Not on file   • Years of education: Not on file   • Highest education level: Not on file   Tobacco Use   • Smoking status: Former Smoker     Packs/day: 0.75     Years: 25.00     Pack years: 18.75     Types: Cigarettes     Last attempt to quit: 3/16/2019     Years since quittin.4   • Smokeless tobacco: Never Used   Substance and Sexual Activity   • Alcohol use: No   • Drug use: No   • Sexual activity: Defer     Partners: Male     Birth control/protection: Surgical       Review of Systems   Constitutional: Positive for activity change, appetite change, fatigue and unexpected weight change. Negative for chills and fever.   HENT:  "Positive for trouble swallowing. Negative for hearing loss and voice change.    Eyes: Negative for visual disturbance.   Respiratory: Negative for apnea, cough, chest tightness, shortness of breath and wheezing.    Cardiovascular: Negative for chest pain, palpitations and leg swelling.   Gastrointestinal: Positive for abdominal pain, constipation, diarrhea, nausea and vomiting. Negative for abdominal distention, anal bleeding, blood in stool and rectal pain.   Endocrine: Negative for cold intolerance and heat intolerance.   Genitourinary: Negative for difficulty urinating, dysuria and flank pain.   Musculoskeletal: Negative for back pain and gait problem.   Skin: Negative for color change, rash and wound.   Neurological: Negative for dizziness, syncope, speech difficulty, weakness, light-headedness, numbness and headaches.   Hematological: Negative for adenopathy. Does not bruise/bleed easily.   Psychiatric/Behavioral: Negative for confusion. The patient is not nervous/anxious.        Objective    /80   Pulse 85   Temp 98 °F (36.7 °C)   Ht 160 cm (63\")   Wt 103 kg (228 lb)   SpO2 98%   Breastfeeding No   BMI 40.39 kg/m²     Physical Exam   Constitutional: She is oriented to person, place, and time. She appears well-developed and well-nourished.   HENT:   Head: Normocephalic and atraumatic.   Eyes: Pupils are equal, round, and reactive to light. EOM are normal. No scleral icterus.   Cardiovascular: Regular rhythm.   Pulmonary/Chest: Effort normal.   Abdominal: Soft. She exhibits no distension. There is tenderness in the right upper quadrant and epigastric area.   Neurological: She is alert and oriented to person, place, and time.   Skin: Skin is warm and dry.   Psychiatric: She has a normal mood and affect. Her behavior is normal.     Outside Records:  I have taken the opportunity to review the patient's available outside records in paper format, scanned format and those available on Care " Everywhere    Results Review:  I have reviewed the entirety of the patient's clinical lab results.  I have also personally reviewed the relevant patient imaging.     PROCEDURE: US GALLBLADDER-     HISTORY: postprandial nausea and diarrhea; K52.9-Noninfective  gastroenteritis and colitis, unspecified; R11.0-Nausea     PROCEDURE: Ultrasound images of the right upper quadrant were obtained.     COMPARISON: 06/18/2018     FINDINGS: Limited images of the pancreas are unremarkable. The liver  parenchyma is echogenic consistent with fatty infiltration. No focal  liver lesions are identified. There is an 8 mm polyp in the body of the  gallbladder. No shadowing stones are evident.  There is no  pericholecystic fluid.  The common duct measures 2.90 mm . Limited  images of the right kidney are unremarkable.     IMPRESSION:  1. Fatty infiltration of the liver.  2. 8 mm polyp in the body of the gallbladder with no shadowing stones or  biliary dilatation.     This report was finalized on 8/12/2020 10:27 AM by Whitney Ashby M.D..        Assessment/Plan   Madalyn was seen today for establish care, nausea, vomiting, diarrhea and abdominal pain.    Diagnoses and all orders for this visit:    Gallbladder polyp  -     Case Request; Standing  -     Case Request    Recurrent biliary colic  -     Case Request; Standing  -     Case Request    Non-intractable vomiting with nausea, unspecified vomiting type  -     Case Request; Standing  -     Case Request    Epigastric pain  -     Case Request; Standing  -     Case Request    Other orders  -     Follow Anesthesia Guidelines / Standing Orders; Future  -     Provide NPO Instructions to Patient; Future  -     Chlorhexidine Skin Prep; Future  -     Obtain Informed Consent      I had a detailed discussion with patient regarding her symptoms, and her imaging work-up.  It is my opinion that the reported gallbladder polyp on ultrasound is more likely a nonmobile stone, and she is having  recurrent episodes of biliary colic as the source of her difficulties.  We discussed the option of laparoscopic cholecystectomy for definitive management of biliary colic.  As an alternative we discussed proceeding with upper endoscopy initially to evaluate her epigastric pain, vomiting, and sensation of dysphasia.  I do not think that this is the sole source of her difficulties.  I truly think that she will benefit from cholecystectomy.  As the last alternative, we discussed a simultaneous lap filipe with EGD.  We discussed risks, benefits, and alternatives to both procedures.  She wishes to proceed with these simultaneously.  We discussed the order in which these will be performed, and the increased risk of abdominal discomfort postoperatively due to concurrent procedures.  She verbalized understanding of this and wished to proceed.  My office will arrange scheduling, and preadmission testing.

## 2020-08-18 RX ORDER — ONDANSETRON HYDROCHLORIDE 8 MG/1
8 TABLET, FILM COATED ORAL EVERY 8 HOURS PRN
Qty: 30 TABLET | Refills: 0 | Status: SHIPPED | OUTPATIENT
Start: 2020-08-18 | End: 2020-11-03

## 2020-08-19 ENCOUNTER — OFFICE VISIT (OUTPATIENT)
Dept: SURGERY | Facility: CLINIC | Age: 45
End: 2020-08-19

## 2020-08-19 VITALS
DIASTOLIC BLOOD PRESSURE: 80 MMHG | BODY MASS INDEX: 40.4 KG/M2 | WEIGHT: 228 LBS | OXYGEN SATURATION: 98 % | HEART RATE: 85 BPM | TEMPERATURE: 98 F | SYSTOLIC BLOOD PRESSURE: 110 MMHG | HEIGHT: 63 IN

## 2020-08-19 DIAGNOSIS — R10.13 EPIGASTRIC PAIN: ICD-10-CM

## 2020-08-19 DIAGNOSIS — K80.50 RECURRENT BILIARY COLIC: ICD-10-CM

## 2020-08-19 DIAGNOSIS — R11.2 NON-INTRACTABLE VOMITING WITH NAUSEA, UNSPECIFIED VOMITING TYPE: ICD-10-CM

## 2020-08-19 DIAGNOSIS — K82.4 GALLBLADDER POLYP: Primary | ICD-10-CM

## 2020-08-19 PROBLEM — R11.10 NON-INTRACTABLE VOMITING: Status: ACTIVE | Noted: 2020-08-19

## 2020-08-19 PROCEDURE — 99214 OFFICE O/P EST MOD 30 MIN: CPT | Performed by: SURGERY

## 2020-08-19 RX ORDER — HEPARIN SODIUM 5000 [USP'U]/ML
5000 INJECTION, SOLUTION INTRAVENOUS; SUBCUTANEOUS ONCE
Status: CANCELLED | OUTPATIENT
Start: 2020-08-24

## 2020-08-19 RX ORDER — SODIUM CHLORIDE, SODIUM LACTATE, POTASSIUM CHLORIDE, CALCIUM CHLORIDE 600; 310; 30; 20 MG/100ML; MG/100ML; MG/100ML; MG/100ML
50 INJECTION, SOLUTION INTRAVENOUS CONTINUOUS
Status: CANCELLED | OUTPATIENT
Start: 2020-08-24

## 2020-08-19 RX ORDER — SODIUM CHLORIDE 0.9 % (FLUSH) 0.9 %
10 SYRINGE (ML) INJECTION AS NEEDED
Status: CANCELLED | OUTPATIENT
Start: 2020-08-24

## 2020-08-19 RX ORDER — SODIUM CHLORIDE 0.9 % (FLUSH) 0.9 %
3 SYRINGE (ML) INJECTION EVERY 12 HOURS SCHEDULED
Status: CANCELLED | OUTPATIENT
Start: 2020-08-24

## 2020-08-20 ENCOUNTER — ANESTHESIA EVENT (OUTPATIENT)
Dept: PERIOP | Facility: HOSPITAL | Age: 45
End: 2020-08-20

## 2020-08-20 ENCOUNTER — APPOINTMENT (OUTPATIENT)
Dept: PREADMISSION TESTING | Facility: HOSPITAL | Age: 45
End: 2020-08-20

## 2020-08-20 VITALS
OXYGEN SATURATION: 96 % | WEIGHT: 230.2 LBS | BODY MASS INDEX: 38.35 KG/M2 | SYSTOLIC BLOOD PRESSURE: 154 MMHG | HEIGHT: 65 IN | DIASTOLIC BLOOD PRESSURE: 98 MMHG | HEART RATE: 87 BPM

## 2020-08-20 LAB
ANION GAP SERPL CALCULATED.3IONS-SCNC: 13.1 MMOL/L (ref 5–15)
B-HCG UR QL: NEGATIVE
BUN SERPL-MCNC: 24 MG/DL (ref 6–20)
BUN/CREAT SERPL: 36.4 (ref 7–25)
CALCIUM SPEC-SCNC: 11.2 MG/DL (ref 8.6–10.5)
CHLORIDE SERPL-SCNC: 98 MMOL/L (ref 98–107)
CO2 SERPL-SCNC: 26.9 MMOL/L (ref 22–29)
CREAT SERPL-MCNC: 0.66 MG/DL (ref 0.57–1)
DEPRECATED RDW RBC AUTO: 35.7 FL (ref 37–54)
ERYTHROCYTE [DISTWIDTH] IN BLOOD BY AUTOMATED COUNT: 12.6 % (ref 12.3–15.4)
GFR SERPL CREATININE-BSD FRML MDRD: 96 ML/MIN/1.73
GLUCOSE SERPL-MCNC: 103 MG/DL (ref 65–99)
HCT VFR BLD AUTO: 39.9 % (ref 34–46.6)
HGB BLD-MCNC: 13.4 G/DL (ref 12–15.9)
MCH RBC QN AUTO: 26.7 PG (ref 26.6–33)
MCHC RBC AUTO-ENTMCNC: 33.6 G/DL (ref 31.5–35.7)
MCV RBC AUTO: 79.5 FL (ref 79–97)
PLATELET # BLD AUTO: 294 10*3/MM3 (ref 140–450)
PMV BLD AUTO: 11.4 FL (ref 6–12)
POTASSIUM SERPL-SCNC: 2.9 MMOL/L (ref 3.5–5.2)
RBC # BLD AUTO: 5.02 10*6/MM3 (ref 3.77–5.28)
SODIUM SERPL-SCNC: 138 MMOL/L (ref 136–145)
WBC # BLD AUTO: 11.65 10*3/MM3 (ref 3.4–10.8)

## 2020-08-20 PROCEDURE — C9803 HOPD COVID-19 SPEC COLLECT: HCPCS

## 2020-08-20 PROCEDURE — 93005 ELECTROCARDIOGRAM TRACING: CPT

## 2020-08-20 PROCEDURE — 80048 BASIC METABOLIC PNL TOTAL CA: CPT | Performed by: SURGERY

## 2020-08-20 PROCEDURE — 81025 URINE PREGNANCY TEST: CPT | Performed by: SURGERY

## 2020-08-20 PROCEDURE — U0002 COVID-19 LAB TEST NON-CDC: HCPCS | Performed by: SURGERY

## 2020-08-20 PROCEDURE — 85027 COMPLETE CBC AUTOMATED: CPT | Performed by: SURGERY

## 2020-08-20 PROCEDURE — 36415 COLL VENOUS BLD VENIPUNCTURE: CPT

## 2020-08-20 PROCEDURE — U0004 COV-19 TEST NON-CDC HGH THRU: HCPCS | Performed by: SURGERY

## 2020-08-20 RX ORDER — POTASSIUM CHLORIDE 20 MEQ/1
40 TABLET, EXTENDED RELEASE ORAL DAILY
Qty: 4 TABLET | Refills: 0 | Status: SHIPPED | OUTPATIENT
Start: 2020-08-20 | End: 2020-08-22

## 2020-08-20 NOTE — DISCHARGE INSTRUCTIONS
PAT PASS GIVEN/REVIEWED WITH PT.  VERBALIZED UNDERSTANDING OF THE FOLLOWING:  DO NOT EAT, DRINK, SMOKE, USE SMOKELESS TOBACCO OR CHEW GUM AFTER MIDNIGHT THE NIGHT BEFORE SURGERY.  THIS ALSO INCLUDES HARD CANDIES AND MINTS.    DO NOT SHAVE THE AREA TO BE OPERATED ON AT LEAST 48 HOURS PRIOR TO THE PROCEDURE.  DO NOT WEAR MAKE UP OR NAIL POLISH.  DO NOT LEAVE IN ANY PIERCING OR WEAR JEWELRY THE DAY OF SURGERY.      DO NOT USE ADHESIVES IF YOU WEAR DENTURES.    DO NOT WEAR EYE CONTACTS; BRING IN YOUR GLASSES.    ONLY TAKE MEDICATION THE MORNING OF YOUR PROCEDURE IF INSTRUCTED BY YOUR SURGEON WITH ENOUGH WATER TO SWALLOW THE MEDICATION.  IF YOUR SURGEON DID NOT SPECIFY WHICH MEDICATIONS TO TAKE, YOU WILL NEED TO CALL THEIR OFFICE FOR FURTHER INSTRUCTIONS AND DO AS THEY INSTRUCT.    LEAVE ANYTHING YOU CONSIDER VALUABLE AT HOME.    YOU WILL NEED TO ARRANGE FOR SOMEONE TO DRIVE YOU HOME AFTER SURGERY.  IT IS RECOMMENDED THAT YOU DO NOT DRIVE, WORK, DRINK ALCOHOL OR MAKE MAJOR DECISIONS FOR AT LEAST 24 HOURS AFTER YOUR PROCEDURE IS COMPLETE.      THE DAY OF YOUR PROCEDURE, BRING IN THE FOLLOWING IF APPLICABLE:   PICTURE ID AND INSURANCE/MEDICARE OR MEDICAID CARDS/ANY CO-PAY THAT MAY BE DUE   COPY OF ADVANCED DIRECTIVE/LIVING WILL/POWER OR    CPAP/BIPAP/INHALERS   SKIN PREP SHEET   YOUR PREADMISSION TESTING PASS (IF NOT A PHONE HISTORY)        PAT patient education COVID testing pre-op instructions reviewed with pt.  Verbalized understanding.      Chlorhexidine wipes along with instruction/verification sheet given to pt.  Instructed pt to apply stickers from chlorhexidine wipes to verification sheet once skin prep has been  completed, and to [facility] the day of the procedure.  Pt. Verbalizes understanding.

## 2020-08-21 ENCOUNTER — TELEPHONE (OUTPATIENT)
Dept: SURGERY | Facility: CLINIC | Age: 45
End: 2020-08-21

## 2020-08-21 NOTE — PROGRESS NOTES
Please call Ms Villegas and let her know that her potassium level was very low on her pre-op labs.  I have sent a prescription for potassium replacements to her pharmacy.  She will need to take these over the weekend so that we can get her level to normal.  I will recheck this on Monday.  If her level is too low, it will delay her surgery .

## 2020-08-21 NOTE — PAT
Called Dr. Lowe's office and spoke with Vijaya regarding pt's potassium level on 8/20/20.  Vijaya stated that pt was aware of Dr. Lowe's RX for potassium.  Informed her that Dr. Lowe had mentioned in her 8/20/20 note that her level will be re-checked DOS on 8/24/20.  Stated that Dr. Lowe was going to put an order in for this.

## 2020-08-22 LAB
REF LAB TEST METHOD: NORMAL
SARS-COV-2 RNA RESP QL NAA+PROBE: NOT DETECTED

## 2020-08-24 ENCOUNTER — HOSPITAL ENCOUNTER (OUTPATIENT)
Facility: HOSPITAL | Age: 45
Setting detail: HOSPITAL OUTPATIENT SURGERY
Discharge: HOME | End: 2020-08-24
Attending: SURGERY | Admitting: SURGERY

## 2020-08-24 ENCOUNTER — ANESTHESIA (OUTPATIENT)
Dept: PERIOP | Facility: HOSPITAL | Age: 45
End: 2020-08-24

## 2020-08-24 VITALS
RESPIRATION RATE: 16 BRPM | DIASTOLIC BLOOD PRESSURE: 65 MMHG | TEMPERATURE: 97.7 F | SYSTOLIC BLOOD PRESSURE: 124 MMHG | OXYGEN SATURATION: 100 % | HEART RATE: 77 BPM

## 2020-08-24 DIAGNOSIS — K82.4 GALLBLADDER POLYP: ICD-10-CM

## 2020-08-24 DIAGNOSIS — R10.13 EPIGASTRIC PAIN: ICD-10-CM

## 2020-08-24 DIAGNOSIS — R11.2 NON-INTRACTABLE VOMITING WITH NAUSEA, UNSPECIFIED VOMITING TYPE: ICD-10-CM

## 2020-08-24 DIAGNOSIS — K80.50 RECURRENT BILIARY COLIC: ICD-10-CM

## 2020-08-24 LAB — POTASSIUM SERPL-SCNC: 3.5 MMOL/L (ref 3.5–5.2)

## 2020-08-24 PROCEDURE — 25010000002 KETOROLAC TROMETHAMINE PER 15 MG: Performed by: NURSE ANESTHETIST, CERTIFIED REGISTERED

## 2020-08-24 PROCEDURE — 25010000002 PROPOFOL 200 MG/20ML EMULSION: Performed by: NURSE ANESTHETIST, CERTIFIED REGISTERED

## 2020-08-24 PROCEDURE — 94799 UNLISTED PULMONARY SVC/PX: CPT

## 2020-08-24 PROCEDURE — 43239 EGD BIOPSY SINGLE/MULTIPLE: CPT | Performed by: SURGERY

## 2020-08-24 PROCEDURE — 25010000002 ONDANSETRON PER 1 MG: Performed by: NURSE ANESTHETIST, CERTIFIED REGISTERED

## 2020-08-24 PROCEDURE — 25010000002 HYDROMORPHONE PER 4 MG: Performed by: NURSE ANESTHETIST, CERTIFIED REGISTERED

## 2020-08-24 PROCEDURE — 25010000002 HEPARIN (PORCINE) PER 1000 UNITS: Performed by: SURGERY

## 2020-08-24 PROCEDURE — 25010000002 MIDAZOLAM PER 1MG: Performed by: NURSE ANESTHETIST, CERTIFIED REGISTERED

## 2020-08-24 PROCEDURE — 25010000003 AMPICILLIN-SULBACTAM PER 1.5 G: Performed by: SURGERY

## 2020-08-24 PROCEDURE — 47562 LAPAROSCOPIC CHOLECYSTECTOMY: CPT | Performed by: SURGERY

## 2020-08-24 PROCEDURE — 84132 ASSAY OF SERUM POTASSIUM: CPT | Performed by: SURGERY

## 2020-08-24 PROCEDURE — 25010000003 LIDOCAINE 1 % SOLUTION: Performed by: SURGERY

## 2020-08-24 PROCEDURE — 25010000002 DEXAMETHASONE PER 1 MG: Performed by: NURSE ANESTHETIST, CERTIFIED REGISTERED

## 2020-08-24 PROCEDURE — 25010000002 FENTANYL CITRATE (PF) 100 MCG/2ML SOLUTION: Performed by: NURSE ANESTHETIST, CERTIFIED REGISTERED

## 2020-08-24 PROCEDURE — 25010000002 HALOPERIDOL LACTATE PER 5 MG: Performed by: NURSE ANESTHETIST, CERTIFIED REGISTERED

## 2020-08-24 DEVICE — LIGAMAX 5 MM ENDOSCOPIC MULTIPLE CLIP APPLIER
Type: IMPLANTABLE DEVICE | Site: ABDOMEN | Status: FUNCTIONAL
Brand: LIGAMAX

## 2020-08-24 RX ORDER — PROMETHAZINE HYDROCHLORIDE 25 MG/1
25 TABLET ORAL ONCE AS NEEDED
Status: DISCONTINUED | OUTPATIENT
Start: 2020-08-24 | End: 2020-08-24 | Stop reason: HOSPADM

## 2020-08-24 RX ORDER — ONDANSETRON 2 MG/ML
4 INJECTION INTRAMUSCULAR; INTRAVENOUS ONCE AS NEEDED
Status: DISCONTINUED | OUTPATIENT
Start: 2020-08-24 | End: 2020-08-24 | Stop reason: HOSPADM

## 2020-08-24 RX ORDER — NEOSTIGMINE METHYLSULFATE 5 MG/5 ML
SYRINGE (ML) INTRAVENOUS AS NEEDED
Status: DISCONTINUED | OUTPATIENT
Start: 2020-08-24 | End: 2020-08-24 | Stop reason: SURG

## 2020-08-24 RX ORDER — SODIUM CHLORIDE, SODIUM LACTATE, POTASSIUM CHLORIDE, CALCIUM CHLORIDE 600; 310; 30; 20 MG/100ML; MG/100ML; MG/100ML; MG/100ML
50 INJECTION, SOLUTION INTRAVENOUS CONTINUOUS
Status: DISCONTINUED | OUTPATIENT
Start: 2020-08-24 | End: 2020-08-24 | Stop reason: HOSPADM

## 2020-08-24 RX ORDER — HEPARIN SODIUM 5000 [USP'U]/ML
5000 INJECTION, SOLUTION INTRAVENOUS; SUBCUTANEOUS ONCE
Status: COMPLETED | OUTPATIENT
Start: 2020-08-24 | End: 2020-08-24

## 2020-08-24 RX ORDER — LIDOCAINE HYDROCHLORIDE 10 MG/ML
INJECTION, SOLUTION INFILTRATION; PERINEURAL AS NEEDED
Status: DISCONTINUED | OUTPATIENT
Start: 2020-08-24 | End: 2020-08-24 | Stop reason: HOSPADM

## 2020-08-24 RX ORDER — PROMETHAZINE HYDROCHLORIDE 25 MG/ML
6.25 INJECTION, SOLUTION INTRAMUSCULAR; INTRAVENOUS ONCE AS NEEDED
Status: DISCONTINUED | OUTPATIENT
Start: 2020-08-24 | End: 2020-08-24 | Stop reason: HOSPADM

## 2020-08-24 RX ORDER — KETOROLAC TROMETHAMINE 30 MG/ML
INJECTION, SOLUTION INTRAMUSCULAR; INTRAVENOUS AS NEEDED
Status: DISCONTINUED | OUTPATIENT
Start: 2020-08-24 | End: 2020-08-24 | Stop reason: SURG

## 2020-08-24 RX ORDER — BUPIVACAINE HYDROCHLORIDE 2.5 MG/ML
INJECTION, SOLUTION EPIDURAL; INFILTRATION; INTRACAUDAL AS NEEDED
Status: DISCONTINUED | OUTPATIENT
Start: 2020-08-24 | End: 2020-08-24 | Stop reason: HOSPADM

## 2020-08-24 RX ORDER — HYDROCODONE BITARTRATE AND ACETAMINOPHEN 7.5; 325 MG/1; MG/1
1 TABLET ORAL EVERY 4 HOURS PRN
Qty: 15 TABLET | Refills: 0 | Status: SHIPPED | OUTPATIENT
Start: 2020-08-24 | End: 2020-11-03

## 2020-08-24 RX ORDER — SODIUM CHLORIDE 0.9 % (FLUSH) 0.9 %
3 SYRINGE (ML) INJECTION EVERY 12 HOURS SCHEDULED
Status: DISCONTINUED | OUTPATIENT
Start: 2020-08-24 | End: 2020-08-24 | Stop reason: HOSPADM

## 2020-08-24 RX ORDER — MIDAZOLAM HYDROCHLORIDE 2 MG/2ML
INJECTION, SOLUTION INTRAMUSCULAR; INTRAVENOUS AS NEEDED
Status: DISCONTINUED | OUTPATIENT
Start: 2020-08-24 | End: 2020-08-24 | Stop reason: SURG

## 2020-08-24 RX ORDER — MEPERIDINE HYDROCHLORIDE 25 MG/ML
12.5 INJECTION INTRAMUSCULAR; INTRAVENOUS; SUBCUTANEOUS
Status: DISCONTINUED | OUTPATIENT
Start: 2020-08-24 | End: 2020-08-24 | Stop reason: HOSPADM

## 2020-08-24 RX ORDER — PROMETHAZINE HYDROCHLORIDE 25 MG/ML
12.5 INJECTION, SOLUTION INTRAMUSCULAR; INTRAVENOUS ONCE AS NEEDED
Status: DISCONTINUED | OUTPATIENT
Start: 2020-08-24 | End: 2020-08-24 | Stop reason: HOSPADM

## 2020-08-24 RX ORDER — MAGNESIUM HYDROXIDE 1200 MG/15ML
LIQUID ORAL AS NEEDED
Status: DISCONTINUED | OUTPATIENT
Start: 2020-08-24 | End: 2020-08-24 | Stop reason: HOSPADM

## 2020-08-24 RX ORDER — HALOPERIDOL 5 MG/ML
INJECTION INTRAMUSCULAR AS NEEDED
Status: DISCONTINUED | OUTPATIENT
Start: 2020-08-24 | End: 2020-08-24 | Stop reason: SURG

## 2020-08-24 RX ORDER — HYDROCODONE BITARTRATE AND ACETAMINOPHEN 7.5; 325 MG/1; MG/1
1 TABLET ORAL ONCE AS NEEDED
Status: DISCONTINUED | OUTPATIENT
Start: 2020-08-24 | End: 2020-08-24 | Stop reason: HOSPADM

## 2020-08-24 RX ORDER — PROPOFOL 10 MG/ML
INJECTION, EMULSION INTRAVENOUS AS NEEDED
Status: DISCONTINUED | OUTPATIENT
Start: 2020-08-24 | End: 2020-08-24 | Stop reason: SURG

## 2020-08-24 RX ORDER — HYDROMORPHONE HCL 110MG/55ML
PATIENT CONTROLLED ANALGESIA SYRINGE INTRAVENOUS AS NEEDED
Status: DISCONTINUED | OUTPATIENT
Start: 2020-08-24 | End: 2020-08-24 | Stop reason: SURG

## 2020-08-24 RX ORDER — ROCURONIUM BROMIDE 10 MG/ML
INJECTION, SOLUTION INTRAVENOUS AS NEEDED
Status: DISCONTINUED | OUTPATIENT
Start: 2020-08-24 | End: 2020-08-24 | Stop reason: SURG

## 2020-08-24 RX ORDER — PROMETHAZINE HYDROCHLORIDE 25 MG/1
25 SUPPOSITORY RECTAL ONCE AS NEEDED
Status: DISCONTINUED | OUTPATIENT
Start: 2020-08-24 | End: 2020-08-24 | Stop reason: HOSPADM

## 2020-08-24 RX ORDER — ONDANSETRON 2 MG/ML
INJECTION INTRAMUSCULAR; INTRAVENOUS AS NEEDED
Status: DISCONTINUED | OUTPATIENT
Start: 2020-08-24 | End: 2020-08-24 | Stop reason: SURG

## 2020-08-24 RX ORDER — DEXAMETHASONE SODIUM PHOSPHATE 4 MG/ML
INJECTION, SOLUTION INTRA-ARTICULAR; INTRALESIONAL; INTRAMUSCULAR; INTRAVENOUS; SOFT TISSUE AS NEEDED
Status: DISCONTINUED | OUTPATIENT
Start: 2020-08-24 | End: 2020-08-24 | Stop reason: SURG

## 2020-08-24 RX ORDER — SODIUM CHLORIDE 0.9 % (FLUSH) 0.9 %
10 SYRINGE (ML) INJECTION AS NEEDED
Status: DISCONTINUED | OUTPATIENT
Start: 2020-08-24 | End: 2020-08-24 | Stop reason: HOSPADM

## 2020-08-24 RX ORDER — FENTANYL CITRATE 50 UG/ML
INJECTION, SOLUTION INTRAMUSCULAR; INTRAVENOUS AS NEEDED
Status: DISCONTINUED | OUTPATIENT
Start: 2020-08-24 | End: 2020-08-24 | Stop reason: SURG

## 2020-08-24 RX ORDER — LIDOCAINE HYDROCHLORIDE 20 MG/ML
INJECTION, SOLUTION INTRAVENOUS AS NEEDED
Status: DISCONTINUED | OUTPATIENT
Start: 2020-08-24 | End: 2020-08-24 | Stop reason: SURG

## 2020-08-24 RX ORDER — IPRATROPIUM BROMIDE AND ALBUTEROL SULFATE 2.5; .5 MG/3ML; MG/3ML
3 SOLUTION RESPIRATORY (INHALATION) ONCE AS NEEDED
Status: DISCONTINUED | OUTPATIENT
Start: 2020-08-24 | End: 2020-08-24 | Stop reason: HOSPADM

## 2020-08-24 RX ADMIN — ROCURONIUM BROMIDE 30 MG: 10 INJECTION INTRAVENOUS at 13:31

## 2020-08-24 RX ADMIN — SODIUM CHLORIDE 3 G: 900 INJECTION INTRAVENOUS at 13:36

## 2020-08-24 RX ADMIN — PROPOFOL 200 MG: 10 INJECTION, EMULSION INTRAVENOUS at 13:31

## 2020-08-24 RX ADMIN — MIDAZOLAM HYDROCHLORIDE 2 MG: 1 INJECTION, SOLUTION INTRAMUSCULAR; INTRAVENOUS at 13:30

## 2020-08-24 RX ADMIN — DEXAMETHASONE SODIUM PHOSPHATE 4 MG: 4 INJECTION, SOLUTION INTRAMUSCULAR; INTRAVENOUS at 13:50

## 2020-08-24 RX ADMIN — HYDROMORPHONE HYDROCHLORIDE 0.5 MG: 2 INJECTION, SOLUTION INTRAMUSCULAR; INTRAVENOUS; SUBCUTANEOUS at 15:12

## 2020-08-24 RX ADMIN — HEPARIN SODIUM 5000 UNITS: 5000 INJECTION INTRAVENOUS; SUBCUTANEOUS at 13:14

## 2020-08-24 RX ADMIN — HALOPERIDOL LACTATE 0.5 MG: 5 INJECTION, SOLUTION INTRAMUSCULAR at 13:50

## 2020-08-24 RX ADMIN — SODIUM CHLORIDE, POTASSIUM CHLORIDE, SODIUM LACTATE AND CALCIUM CHLORIDE: 600; 310; 30; 20 INJECTION, SOLUTION INTRAVENOUS at 14:18

## 2020-08-24 RX ADMIN — FENTANYL CITRATE 100 MCG: 50 INJECTION INTRAMUSCULAR; INTRAVENOUS at 13:36

## 2020-08-24 RX ADMIN — GLYCOPYRROLATE 0.4 MG: 0.2 INJECTION, SOLUTION INTRAMUSCULAR; INTRAVENOUS at 15:03

## 2020-08-24 RX ADMIN — LIDOCAINE HYDROCHLORIDE 60 MG: 20 INJECTION, SOLUTION INTRAVENOUS at 13:31

## 2020-08-24 RX ADMIN — Medication 3 MG: at 15:03

## 2020-08-24 RX ADMIN — HYDROMORPHONE HYDROCHLORIDE 0.5 MG: 2 INJECTION, SOLUTION INTRAMUSCULAR; INTRAVENOUS; SUBCUTANEOUS at 15:06

## 2020-08-24 RX ADMIN — HYDROMORPHONE HYDROCHLORIDE 0.5 MG: 2 INJECTION, SOLUTION INTRAMUSCULAR; INTRAVENOUS; SUBCUTANEOUS at 14:23

## 2020-08-24 RX ADMIN — HYDROMORPHONE HYDROCHLORIDE 0.5 MG: 2 INJECTION, SOLUTION INTRAMUSCULAR; INTRAVENOUS; SUBCUTANEOUS at 14:59

## 2020-08-24 RX ADMIN — ONDANSETRON 4 MG: 2 INJECTION INTRAMUSCULAR; INTRAVENOUS at 13:50

## 2020-08-24 RX ADMIN — HYDROMORPHONE HYDROCHLORIDE 0.5 MG: 2 INJECTION, SOLUTION INTRAMUSCULAR; INTRAVENOUS; SUBCUTANEOUS at 14:44

## 2020-08-24 RX ADMIN — KETOROLAC TROMETHAMINE 15 MG: 30 INJECTION, SOLUTION INTRAMUSCULAR at 14:53

## 2020-08-24 RX ADMIN — SODIUM CHLORIDE, POTASSIUM CHLORIDE, SODIUM LACTATE AND CALCIUM CHLORIDE 50 ML/HR: 600; 310; 30; 20 INJECTION, SOLUTION INTRAVENOUS at 11:30

## 2020-08-24 NOTE — ANESTHESIA POSTPROCEDURE EVALUATION
Patient: Madalyn Villegas    Procedure Summary     Date:  08/24/20 Room / Location:  [facility] OR 1 /  [facility]    Anesthesia Start:  1330 Anesthesia Stop:  1517    Procedures:       CHOLECYSTECTOMY LAPAROSCOPIC (N/A Abdomen)      ESOPHAGOGASTRODUODENOSCOPY WITH BIOPSY (N/A Esophagus) Diagnosis:       Gallbladder polyp      Recurrent biliary colic      Non-intractable vomiting with nausea, unspecified vomiting type      Epigastric pain      (Gallbladder polyp [K82.4])      (Recurrent biliary colic [K80.50])      (Non-intractable vomiting with nausea, unspecified vomiting type [R11.2])      (Epigastric pain [R10.13])    Surgeon:  Amy Lowe MD Provider:  Eugenia Arguello CRNA    Anesthesia Type:  Not recorded ASA Status:  3          Anesthesia Type: No value filed.    Vitals  Vitals Value Taken Time   /80 8/24/2020  3:15 PM   Temp     Pulse 80 8/24/2020  3:17 PM   Resp     SpO2 100 % 8/24/2020  3:17 PM   Vitals shown include unvalidated device data.        Post Anesthesia Care and Evaluation    Patient location during evaluation: PACU  Patient participation: complete - patient participated  Level of consciousness: awake and alert and sleepy but conscious  Pain score: 2  Pain management: adequate  Airway patency: patent  Anesthetic complications: No anesthetic complications  PONV Status: none  Cardiovascular status: acceptable  Respiratory status: acceptable and face mask  Hydration status: acceptable

## 2020-08-24 NOTE — ANESTHESIA PROCEDURE NOTES
Airway  Urgency: elective    Date/Time: 8/24/2020 1:32 PM  Airway not difficult    General Information and Staff    Patient location during procedure: OR  CRNA: Eugenia Arguello CRNA    Indications and Patient Condition  Indications for airway management: airway protection    Preoxygenated: yes  MILS not maintained throughout  Mask difficulty assessment: 1 - vent by mask    Final Airway Details  Final airway type: endotracheal airway      Successful airway: ETT  Cuffed: yes   Successful intubation technique: direct laryngoscopy  Endotracheal tube insertion site: oral  Blade: Leti  Blade size: 3  ETT size (mm): 7.0  Cormack-Lehane Classification: grade I - full view of glottis  Placement verified by: chest auscultation and capnometry   Cuff volume (mL): 5  Measured from: lips  ETT/EBT  to lips (cm): 20  Number of attempts at approach: 1  Assessment: lips, teeth, and gum same as pre-op and atraumatic intubation    Additional Comments  Negative epigastric sounds, Breath sound equal bilaterally with symmetric chest rise and fall

## 2020-08-24 NOTE — DISCHARGE INSTRUCTIONS
No pushing, pulling, tugging, heavy lifting, or strenuous activity.  No major decision making, driving, or drinking alcoholic beverages for 24 hours. (due to the medications you have received)  Always use good hand hygiene/washing techniques.  NO driving while taking pain medications.    * if you have an incision:  Check your incision area every day for signs of infection.   Check for:  * more redness, swelling, or pain  *more fluid or blood  *warmth  *pus or bad smell.    To assist you in voiding:  Drink plenty of fluids  Listen to running water while attempting to void.    If you are unable to urinate and you have an uncomfortable urge to void or it has been   6 hours since you were discharged, return to the Emergency Room.    May splint abdomen when moving, coughing, sneezing, laughing, or increasing activity as comfort measure.      Apply ice to incision sites, remove, and reapply as directed per physician.  Do not use ice continuously.

## 2020-08-24 NOTE — ANESTHESIA PREPROCEDURE EVALUATION
Anesthesia Evaluation     Patient summary reviewed and Nursing notes reviewed   no history of anesthetic complications:  NPO Solid Status: > 8 hours  NPO Liquid Status: > 8 hours           Airway   Mallampati: II  TM distance: <3 FB  Neck ROM: full  possible difficult intubation, difficult intubation highly probable and small opening  Dental - normal exam   (+) poor dentition    Pulmonary    (+) a smoker Current Smoked day of surgery, COPD, asthma,shortness of breath, sleep apnea,   Cardiovascular     ECG reviewed    (+) hypertension, CAD ( inc risk morb obesity, [place], ? dm htn ), NOLAN, PVD, hyperlipidemia,       Neuro/Psych  (+) headaches, numbness,     GI/Hepatic/Renal/Endo    (+) obesity, morbid obesity, GERD well controlled, GI bleeding , liver disease fatty liver disease, renal disease stones,     Musculoskeletal     (+) arthralgias, back pain, chronic pain, myalgias,   Abdominal    Substance History      OB/GYN          Other   arthritis,      ROS/Med Hx Other: Lab reviewed  ekg sr  cxr nad  Hx of med non compliance with lifestyle changes  Low pain tolerance and tolerance to pain meds  Echo Technically difficult study.     2.  Mild left ventricular hypertrophy with normal LV systolic function (EF     65%).      3.  Mild left atrial enlargement with normal left ventricular end-diastolic     pressures.     4.  Moderate dilatation of the right heart chambers with maintained RV systolic     function.       5.  Mild tricuspid insufficiency with no evidence of pulmonary hypertension.                              Anesthesia Plan    ASA 3   (Risks and benefits discussed including risk of aspiration, recall and dental damage. All patient questions answered. Will continue with POC.)  intravenous induction     Anesthetic plan, all risks, benefits, and alternatives have been provided, discussed and informed consent has been obtained with: patient.

## 2020-08-29 LAB
LAB AP CASE REPORT: NORMAL
PATH REPORT.FINAL DX SPEC: NORMAL

## 2020-09-01 ENCOUNTER — OFFICE VISIT (OUTPATIENT)
Dept: SURGERY | Facility: CLINIC | Age: 45
End: 2020-09-01

## 2020-09-01 VITALS
WEIGHT: 232 LBS | OXYGEN SATURATION: 99 % | HEART RATE: 88 BPM | BODY MASS INDEX: 38.65 KG/M2 | DIASTOLIC BLOOD PRESSURE: 68 MMHG | TEMPERATURE: 97.8 F | SYSTOLIC BLOOD PRESSURE: 112 MMHG | HEIGHT: 65 IN

## 2020-09-01 DIAGNOSIS — K29.60 BILE REFLUX GASTRITIS: ICD-10-CM

## 2020-09-01 DIAGNOSIS — K21.00 GASTROESOPHAGEAL REFLUX DISEASE WITH ESOPHAGITIS: ICD-10-CM

## 2020-09-01 DIAGNOSIS — Z90.49 S/P LAPAROSCOPIC CHOLECYSTECTOMY: Primary | ICD-10-CM

## 2020-09-01 PROCEDURE — 99024 POSTOP FOLLOW-UP VISIT: CPT | Performed by: SURGERY

## 2020-09-15 PROBLEM — R10.13 EPIGASTRIC PAIN: Status: RESOLVED | Noted: 2020-08-19 | Resolved: 2020-09-15

## 2020-09-15 PROBLEM — K80.50 RECURRENT BILIARY COLIC: Status: RESOLVED | Noted: 2020-08-19 | Resolved: 2020-09-15

## 2020-09-23 ENCOUNTER — OFFICE VISIT (OUTPATIENT)
Dept: SURGERY | Facility: CLINIC | Age: 45
End: 2020-09-23

## 2020-09-23 VITALS
HEART RATE: 87 BPM | HEIGHT: 65 IN | TEMPERATURE: 98 F | WEIGHT: 256 LBS | SYSTOLIC BLOOD PRESSURE: 124 MMHG | OXYGEN SATURATION: 98 % | BODY MASS INDEX: 42.65 KG/M2 | DIASTOLIC BLOOD PRESSURE: 86 MMHG

## 2020-09-23 DIAGNOSIS — Z90.49 S/P LAPAROSCOPIC CHOLECYSTECTOMY: Primary | ICD-10-CM

## 2020-09-23 PROCEDURE — 99024 POSTOP FOLLOW-UP VISIT: CPT | Performed by: SURGERY

## 2020-11-03 ENCOUNTER — OFFICE VISIT (OUTPATIENT)
Dept: FAMILY MEDICINE CLINIC | Facility: CLINIC | Age: 45
End: 2020-11-03

## 2020-11-03 VITALS
TEMPERATURE: 98.4 F | RESPIRATION RATE: 14 BRPM | DIASTOLIC BLOOD PRESSURE: 80 MMHG | SYSTOLIC BLOOD PRESSURE: 128 MMHG | HEIGHT: 65 IN | OXYGEN SATURATION: 97 % | WEIGHT: 245 LBS | BODY MASS INDEX: 40.82 KG/M2 | HEART RATE: 81 BPM

## 2020-11-03 DIAGNOSIS — M79.7 FIBROMYALGIA: ICD-10-CM

## 2020-11-03 DIAGNOSIS — I10 ESSENTIAL HYPERTENSION: ICD-10-CM

## 2020-11-03 DIAGNOSIS — R21 RASH: ICD-10-CM

## 2020-11-03 DIAGNOSIS — Z51.81 MEDICATION MONITORING ENCOUNTER: ICD-10-CM

## 2020-11-03 DIAGNOSIS — E87.6 HYPOKALEMIA: Primary | ICD-10-CM

## 2020-11-03 PROCEDURE — 99214 OFFICE O/P EST MOD 30 MIN: CPT | Performed by: FAMILY MEDICINE

## 2020-11-03 RX ORDER — GUAIFENESIN, PSEUDOEPHEDRINE HYDROCHLORIDE 600; 60 MG/1; MG/1
TABLET, EXTENDED RELEASE ORAL
COMMUNITY

## 2020-11-03 NOTE — PROGRESS NOTES
Subjective   Madalyn Villegas is a 45 y.o. female.     History of Present Illness   Mrs. Villegas presents today for routine follow-up on chronic medical problems.    She has hypertension, currently on chlorthalidone.  Noted to have hypokalemia in the past.  Most recent checkup low normal.  She does complain of muscle spasm, cramping.  Of note, does have underlying diagnosis of fibromyalgia.    Has hyperlipidemia for which he is currently on atorvastatin.  Tolerating well.  Does not feel statin led to or has increased muscle pain.    She is due for influenza vaccination.    She is overdue for cervical cancer screening.  Reminder provided today.  She declines having today.    She complains of a dry itchy rash on the sides of her fingers.  Has had previously but generally for few weeks and winter.  She has noticed more consistently throughout the year.  Using moisturizers without much improvement.  No known exposures.  No change in personal care products.    On muscle relaxant, NSAID for management of fibromyalgia.  Recent symptoms stable.    The following portions of the patient's history were reviewed and updated as appropriate: allergies, current medications, past family history, past medical history, past social history, past surgical history and problem list.    Review of Systems   Constitutional: Positive for fatigue and unexpected weight change. Negative for diaphoresis and fever.   HENT: Positive for congestion and postnasal drip. Negative for mouth sores, rhinorrhea, sinus pain and sore throat.    Eyes: Negative for visual disturbance.   Respiratory: Negative for cough, shortness of breath and wheezing.    Cardiovascular: Negative for chest pain, palpitations and leg swelling.   Gastrointestinal: Positive for nausea. Negative for blood in stool and vomiting.        Heartburn   Endocrine: Positive for heat intolerance. Negative for polydipsia and polyuria.   Genitourinary: Negative for dysuria and hematuria.    Musculoskeletal: Positive for arthralgias, back pain and myalgias.   Skin: Positive for rash. Negative for wound.   Neurological: Positive for headaches. Negative for dizziness, tremors and weakness.   Hematological: Negative for adenopathy. Bruises/bleeds easily.   Psychiatric/Behavioral: Negative for confusion, dysphoric mood and sleep disturbance.   Pt's previous ROS reviewed and updated as indicated.       Objective    Vitals:    11/03/20 0806   BP: 128/80   Pulse: 81   Resp: 14   Temp: 98.4 °F (36.9 °C)   SpO2: 97%     Body mass index is 40.77 kg/m².      11/03/20 0806   Weight: 111 kg (245 lb)       Physical Exam  Vitals signs and nursing note reviewed.   Constitutional:       Appearance: She is well-developed and well-groomed. She is morbidly obese. She is not ill-appearing.   HENT:      Head: Normocephalic and atraumatic.   Eyes:      General: No scleral icterus.     Extraocular Movements: Extraocular movements intact.      Conjunctiva/sclera: Conjunctivae normal.   Neck:      Thyroid: No thyroid mass.      Vascular: Normal carotid pulses.   Cardiovascular:      Rate and Rhythm: Normal rate and regular rhythm.      Pulses: Normal pulses.      Heart sounds: Normal heart sounds.   Pulmonary:      Effort: Pulmonary effort is normal.      Breath sounds: Normal breath sounds and air entry.   Musculoskeletal:      Right lower leg: No edema.      Left lower leg: No edema.   Lymphadenopathy:      Cervical: No cervical adenopathy.   Skin:     General: Skin is warm and dry.      Coloration: Skin is not jaundiced or pale.      Findings: Rash (dry, maculopapular rash along sides of several digits) present.   Neurological:      Mental Status: She is alert and oriented to person, place, and time.      Motor: No tremor.      Gait: Gait is intact.   Psychiatric:         Mood and Affect: Mood and affect normal.         Behavior: Behavior normal. Behavior is cooperative.         Cognition and Memory: Cognition normal.        Lab Results   Component Value Date    TSH 1.790 08/03/2020     Lab Results   Component Value Date    WBC 11.65 (H) 08/20/2020    HGB 13.4 08/20/2020    HCT 39.9 08/20/2020    MCV 79.5 08/20/2020     08/20/2020     Lab Results   Component Value Date    ALT 29 08/03/2020     Lab Results   Component Value Date    HGBA1C 5.62 (H) 08/03/2020         Assessment/Plan   Diagnoses and all orders for this visit:    1. Hypokalemia (Primary)  -     Basic Metabolic Panel  -     Magnesium    2. Essential hypertension  -     Basic Metabolic Panel  -     Magnesium    3. Medication monitoring encounter  -     Basic Metabolic Panel  -     Magnesium    4. Rash    5. Fibromyalgia       Hypertension, currently treated with thiazide diuretic.  Has developed subsequent hypokalemia.  Recheck as above.  If low normal, or hypokalemic DC chlorthalidone and replace with alternative antihypertensive.    Suspect rash secondary to eczema.  Preventive strategies reviewed.  Use low-dose over-the-counter hydrocortisone cream as needed.    Fibromyalgia stable.  Continue tizanidine and Mobic as needed.    She is encouraged to schedule cervical cancer screening at her earliest convenience.    Influenza vaccinations out of stock today.  She encouraged to obtain at her earliest convenience pharmacy or call back to check on our supply within the next week.    Routine follow-up in 3 months, sooner later/instructed.  I will contact patient regarding test results and provide instructions regarding any necessary changes in plan of care.  Patient was encouraged to keep me informed of any acute changes, lack of improvement, or any new concerning symptoms.  Pt is aware of reasons to seek emergent care.  Patient voiced understanding of all instructions and denied further questions.              Please note that portions of this note may have been completed with a voice recognition program. Efforts were made to edit the dictations, but occasionally  words are mistranscribed.

## 2020-11-04 LAB
BUN SERPL-MCNC: 16 MG/DL (ref 6–20)
BUN/CREAT SERPL: 27.1 (ref 7–25)
CALCIUM SERPL-MCNC: 9.8 MG/DL (ref 8.6–10.5)
CHLORIDE SERPL-SCNC: 103 MMOL/L (ref 98–107)
CO2 SERPL-SCNC: 28.5 MMOL/L (ref 22–29)
CREAT SERPL-MCNC: 0.59 MG/DL (ref 0.57–1)
GLUCOSE SERPL-MCNC: 100 MG/DL (ref 65–99)
MAGNESIUM SERPL-MCNC: 1.8 MG/DL (ref 1.6–2.6)
POTASSIUM SERPL-SCNC: 3.5 MMOL/L (ref 3.5–5.2)
SODIUM SERPL-SCNC: 141 MMOL/L (ref 136–145)

## 2020-11-06 RX ORDER — POTASSIUM CHLORIDE 20 MEQ/1
20 TABLET, EXTENDED RELEASE ORAL DAILY
Qty: 30 TABLET | Refills: 2 | Status: SHIPPED | OUTPATIENT
Start: 2020-11-06 | End: 2021-02-01

## 2021-02-01 RX ORDER — POTASSIUM CHLORIDE 20 MEQ/1
TABLET, EXTENDED RELEASE ORAL
Qty: 90 TABLET | Refills: 0 | Status: SHIPPED | OUTPATIENT
Start: 2021-02-01 | End: 2021-03-04

## 2021-02-03 ENCOUNTER — OFFICE VISIT (OUTPATIENT)
Dept: FAMILY MEDICINE CLINIC | Facility: CLINIC | Age: 46
End: 2021-02-03

## 2021-02-03 VITALS
BODY MASS INDEX: 42.05 KG/M2 | TEMPERATURE: 97.3 F | SYSTOLIC BLOOD PRESSURE: 138 MMHG | OXYGEN SATURATION: 96 % | DIASTOLIC BLOOD PRESSURE: 80 MMHG | HEIGHT: 65 IN | HEART RATE: 84 BPM | WEIGHT: 252.4 LBS | RESPIRATION RATE: 20 BRPM

## 2021-02-03 DIAGNOSIS — R68.81 EARLY SATIETY: ICD-10-CM

## 2021-02-03 DIAGNOSIS — M79.7 FIBROMYALGIA: ICD-10-CM

## 2021-02-03 DIAGNOSIS — R11.0 POSTPRANDIAL NAUSEA: ICD-10-CM

## 2021-02-03 DIAGNOSIS — K76.0 FATTY LIVER: ICD-10-CM

## 2021-02-03 DIAGNOSIS — R11.10 POSTPRANDIAL VOMITING: ICD-10-CM

## 2021-02-03 DIAGNOSIS — R73.03 PREDIABETES: ICD-10-CM

## 2021-02-03 DIAGNOSIS — E78.2 MIXED HYPERLIPIDEMIA: ICD-10-CM

## 2021-02-03 DIAGNOSIS — I10 ESSENTIAL HYPERTENSION: Primary | ICD-10-CM

## 2021-02-03 PROCEDURE — 99214 OFFICE O/P EST MOD 30 MIN: CPT | Performed by: FAMILY MEDICINE

## 2021-02-03 RX ORDER — VALSARTAN 40 MG/1
40 TABLET ORAL DAILY
Qty: 90 TABLET | Refills: 3 | Status: SHIPPED | OUTPATIENT
Start: 2021-02-03 | End: 2021-04-22

## 2021-02-03 NOTE — PROGRESS NOTES
"Subjective   Madalyn Villegas is a 45 y.o. female.     History of Present Illness   Mrs. Villegas presents today for f/u on several chronic med problems.    She continues to c/o \"pressure and nausea\" after eating/drinking, frequent regurg of food, need to eat every 2 hours as she stays hungry. Feels \"food stops\" in her lower chest/upper abd. Taking nexium for heartburn/reflux.    She is s/p cholecystectomy. Had EGD 8/24/2020. Had bile present in stomach, mid and lower esophagus. Also with Barretts appearing segment, path negative.    Father with h/o gastroparesis not related to diabetes.    Often feels her belly is \"swollen and hard\" in spots. No significant abd pain.    Still have diarreha daily, usually 4 times before lunch. 2 weeks out of month is worse, when she would normally have period or ovulate. Does have occ bouts of constipation.    On chlorthalidone for HTN. Keeping low potassium, struggling with potassium supplement compliance.    On atorvastatin for HLP. Tolerating well.    Has chronic obesity with fatty liver and prediabetes. Struggling to eat heatlhy diet due to GI issues above.    On Mobic for FMSx, uses muscle relaxant as well prn. (On PPI for GI protection.)    The following portions of the patient's history were reviewed and updated as appropriate: allergies, current medications, past family history, past medical history, past social history, past surgical history and problem list.    Review of Systems   Constitutional: Positive for fatigue. Negative for diaphoresis and fever.   HENT: Positive for congestion and postnasal drip. Negative for mouth sores, rhinorrhea, sinus pain and sore throat.    Eyes: Negative for visual disturbance.   Respiratory: Negative for cough, shortness of breath and wheezing.    Cardiovascular: Negative for chest pain, palpitations and leg swelling.   Gastrointestinal: Positive for nausea and vomiting. Negative for blood in stool.        Heartburn   Endocrine: Positive for " heat intolerance. Negative for polydipsia and polyuria.   Genitourinary: Negative for dysuria and hematuria.   Musculoskeletal: Positive for arthralgias, back pain and myalgias.   Skin: Positive for rash. Negative for wound.   Neurological: Positive for headaches. Negative for dizziness, tremors and weakness.   Hematological: Negative for adenopathy. Bruises/bleeds easily.   Psychiatric/Behavioral: Negative for confusion, dysphoric mood and sleep disturbance.   Pt's previous ROS reviewed and updated as indicated.       Objective    Vitals:    02/03/21 1343   BP: 138/80   Pulse: 84   Resp: 20   Temp: 97.3 °F (36.3 °C)   SpO2: 96%     Body mass index is 42 kg/m².      02/03/21  1343   Weight: 114 kg (252 lb 6.4 oz)       Physical Exam  Vitals signs and nursing note reviewed.   Constitutional:       Appearance: She is well-developed and well-groomed. She is morbidly obese. She is not ill-appearing.   HENT:      Head: Normocephalic and atraumatic.   Eyes:      General: No scleral icterus.     Extraocular Movements: Extraocular movements intact.      Conjunctiva/sclera: Conjunctivae normal.   Neck:      Thyroid: No thyroid mass.      Vascular: Normal carotid pulses.   Cardiovascular:      Rate and Rhythm: Normal rate and regular rhythm.      Pulses: Normal pulses.      Heart sounds: Normal heart sounds.   Pulmonary:      Effort: Pulmonary effort is normal.      Breath sounds: Normal breath sounds and air entry.   Abdominal:      General: Bowel sounds are normal.      Palpations: Abdomen is soft. There is no mass (exam limited by body habitus).      Tenderness: There is abdominal tenderness (mild) in the epigastric area. There is no guarding or rebound.   Musculoskeletal:      Right lower leg: No edema.      Left lower leg: No edema.   Lymphadenopathy:      Cervical: No cervical adenopathy.   Skin:     General: Skin is warm and dry.      Coloration: Skin is not jaundiced or pale.   Neurological:      Mental Status: She is  alert and oriented to person, place, and time.      Motor: No tremor.      Gait: Gait is intact.   Psychiatric:         Mood and Affect: Mood and affect normal.         Behavior: Behavior normal. Behavior is cooperative.         Cognition and Memory: Cognition normal.     Pt's previous physical exam reviewed and updated as indicated.    Lab Results   Component Value Date    LIPASE 26 08/03/2020     Lab Results   Component Value Date    AMYLASE 60 08/03/2020     EGD report reviewed on chart.  General surgery consult reviewed on chart.    Assessment/Plan   Diagnoses and all orders for this visit:    1. Essential hypertension (Primary)  -     valsartan (DIOVAN) 40 MG tablet; Take 1 tablet by mouth Daily.  Dispense: 90 tablet; Refill: 3  -     CBC (No Diff); Future  -     Comprehensive Metabolic Panel; Future    2. Mixed hyperlipidemia  -     Comprehensive Metabolic Panel; Future  -     Lipid Panel; Future    3. Fibromyalgia    4. Fatty liver  -     Comprehensive Metabolic Panel; Future    5. Prediabetes  -     Comprehensive Metabolic Panel; Future  -     Hemoglobin A1c; Future    6. Postprandial vomiting  -     Ambulatory Referral to Gastroenterology  -     CBC (No Diff); Future  -     Comprehensive Metabolic Panel; Future    7. Postprandial nausea  -     Ambulatory Referral to Gastroenterology  -     CBC (No Diff); Future  -     Comprehensive Metabolic Panel; Future    8. Early satiety  -     Ambulatory Referral to Gastroenterology  -     CBC (No Diff); Future  -     Comprehensive Metabolic Panel; Future       HTN controlled. Having persistent hypokalemia on chlorthalidone. Switch to valsartan. HLP with good tolerance of statin. Pt advised to eat a heart healthy diet and get regular aerobic exercise.    FMSx stable. Continue muscle relaxant, NSAID as needed.    Chronic morbid obesity with fatty liver, prediabetes. F/u labs as above. Nutrition and activity goals reviewed including: mainly water to drink, limit white  flour/processed sugar, higher lean protein, high fiber carbs, regular meals, working toward 150 mins cardio per week, resistance training 2x/week.    Refer to GI for further eval of postprandial n/v, early satiety etc with no explanation for symptoms on recent EGD other than reflux, possible gastroparesis. Continue pPI.    Routine f/u in 1 month for BP recheck, sooner as needed/instructed.  I will contact patient regarding test results and provide instructions regarding any necessary changes in plan of care.  Patient was encouraged to keep me informed of any acute changes, lack of improvement, or any new concerning symptoms.  Pt is aware of reasons to seek emergent care.  Patient voiced understanding of all instructions and denied further questions.

## 2021-02-19 ENCOUNTER — TELEPHONE (OUTPATIENT)
Dept: FAMILY MEDICINE CLINIC | Facility: CLINIC | Age: 46
End: 2021-02-19

## 2021-02-19 NOTE — TELEPHONE ENCOUNTER
Called pt and advised we could get her an apt today, made one with [employer],  While talking to patient I could hear her struggling to breath, so I made the apt but advised she should go to the ED or an UC she said she was at work, I told her with how bad she is strggling to breath I wouldn't think she should be working,  She states to keep the apt with [employer] but that she is going to try to get to UC.

## 2021-02-19 NOTE — TELEPHONE ENCOUNTER
----- Message from Madalyn Villegas sent at 2/19/2021  8:58 AM EST -----  Regarding: Non-Urgent Medical Question  Contact: [phone]  I have been struggling with a sinus infection since my last visit a month ago. It is now moving into my lungs - breathing is difficult!  Only rattle every now & then but I feel like I have a heavy weight sitting on my chest (Heavier then normal!). Can I get an earlier appointment then March 4?  Please and Thank You!

## 2021-03-04 ENCOUNTER — OFFICE VISIT (OUTPATIENT)
Dept: FAMILY MEDICINE CLINIC | Facility: CLINIC | Age: 46
End: 2021-03-04

## 2021-03-04 VITALS
TEMPERATURE: 97.3 F | OXYGEN SATURATION: 96 % | DIASTOLIC BLOOD PRESSURE: 80 MMHG | HEIGHT: 65 IN | WEIGHT: 256.4 LBS | SYSTOLIC BLOOD PRESSURE: 150 MMHG | HEART RATE: 76 BPM | RESPIRATION RATE: 20 BRPM | BODY MASS INDEX: 42.72 KG/M2

## 2021-03-04 DIAGNOSIS — R51.9 ACUTE INTRACTABLE HEADACHE, UNSPECIFIED HEADACHE TYPE: Primary | ICD-10-CM

## 2021-03-04 DIAGNOSIS — I10 UNCONTROLLED HYPERTENSION: ICD-10-CM

## 2021-03-04 PROCEDURE — 96372 THER/PROPH/DIAG INJ SC/IM: CPT | Performed by: FAMILY MEDICINE

## 2021-03-04 PROCEDURE — 99214 OFFICE O/P EST MOD 30 MIN: CPT | Performed by: FAMILY MEDICINE

## 2021-03-04 RX ORDER — KETOROLAC TROMETHAMINE 30 MG/ML
60 INJECTION, SOLUTION INTRAMUSCULAR; INTRAVENOUS ONCE
Status: COMPLETED | OUTPATIENT
Start: 2021-03-04 | End: 2021-03-04

## 2021-03-04 RX ORDER — BUTALBITAL, ACETAMINOPHEN AND CAFFEINE 50; 325; 40 MG/1; MG/1; MG/1
TABLET ORAL
Qty: 12 TABLET | Refills: 0 | Status: SHIPPED | OUTPATIENT
Start: 2021-03-04 | End: 2021-04-22

## 2021-03-04 RX ORDER — CLONIDINE HYDROCHLORIDE 0.1 MG/1
0.1 TABLET ORAL EVERY 12 HOURS SCHEDULED
Status: DISCONTINUED | OUTPATIENT
Start: 2021-03-04 | End: 2022-05-26

## 2021-03-04 RX ORDER — METHYLPREDNISOLONE ACETATE 80 MG/ML
80 INJECTION, SUSPENSION INTRA-ARTICULAR; INTRALESIONAL; INTRAMUSCULAR; SOFT TISSUE ONCE
Status: COMPLETED | OUTPATIENT
Start: 2021-03-04 | End: 2021-03-04

## 2021-03-04 RX ADMIN — KETOROLAC TROMETHAMINE 60 MG: 30 INJECTION, SOLUTION INTRAMUSCULAR; INTRAVENOUS at 10:42

## 2021-03-04 RX ADMIN — METHYLPREDNISOLONE ACETATE 80 MG: 80 INJECTION, SUSPENSION INTRA-ARTICULAR; INTRALESIONAL; INTRAMUSCULAR; SOFT TISSUE at 10:43

## 2021-03-04 NOTE — PROGRESS NOTES
"Subjective   Madalyn Villegas is a 45 y.o. female.     History of Present Illness   Mrs. Villegas presents today for 1 month recheck of BP. Medication changed last visit due to persistent hypokalemia on chlorthalidone. She has been taking valsartan approx 1 month now. She is concerned it may be causing \"rapid heart beats\".    Since her last visit, 2 weeks ago, she was seen at  for reported sinus infection and bronchitis. Was given augmentin, oral CS, albuterol. She finishes treatment few days ago. Reports cough/wheeze have improved but not sinus symptoms. She c/o nasal congestion, diffuse headache across top of head bilaterally. Some postnasal drip, sore throat of waxing/waning severity. Headache has fluctuated in severity. \"2 days were migraine level\". Also with tension in neck. No fever. No CP, SOA.    She also mentions her \"hot flashes have come back\".    She has been using high dose mucinex D over past several days. Has not been checking BP at home as she does not have cuff.    Tested neg for COVID via rapid testing at .    On my recheck bP LUE is 162/94.    Has been using multipel doses of ibuprofen for her headaches. Provides moderate short term relief.    The following portions of the patient's history were reviewed and updated as appropriate: allergies, current medications, past family history, past medical history, past social history, past surgical history and problem list.    Review of Systems   Constitutional: Positive for fatigue. Negative for diaphoresis and fever.   HENT: Positive for congestion, postnasal drip and sinus pressure. Negative for ear pain, mouth sores, rhinorrhea, sinus pain and sore throat.    Eyes: Negative for visual disturbance.   Respiratory: Negative for cough, shortness of breath and wheezing.    Cardiovascular: Negative for chest pain, palpitations and leg swelling.   Gastrointestinal: Positive for nausea (chronic) and vomiting (intermittent, chronic). Negative for blood in " stool.        Heartburn   Endocrine: Positive for heat intolerance. Negative for polydipsia and polyuria.   Genitourinary: Negative for dysuria and hematuria.   Musculoskeletal: Positive for arthralgias, back pain and myalgias.   Skin: Negative for rash.   Neurological: Positive for headaches. Negative for dizziness, tremors and weakness.   Hematological: Negative for adenopathy. Bruises/bleeds easily.   Psychiatric/Behavioral: Negative for confusion, dysphoric mood and sleep disturbance.   Pt's previous ROS reviewed and updated as indicated.       Objective    Vitals:    03/04/21 0954   BP: 150/80   Pulse: 76   Resp: 20   Temp: 97.3 °F (36.3 °C)   SpO2: 96%     Body mass index is 42.67 kg/m².      03/04/21  0954   Weight: 116 kg (256 lb 6.4 oz)       Physical Exam  Vitals signs and nursing note reviewed.   Constitutional:       General: She is in acute distress (mild due to pain).      Appearance: She is well-developed and well-groomed. She is morbidly obese. She is not ill-appearing.   HENT:      Head: Normocephalic and atraumatic.      Right Ear: Ear canal and external ear normal. Tympanic membrane is retracted.      Left Ear: Ear canal and external ear normal. Tympanic membrane is retracted.      Ears:      Comments: Dull TMs     Nose: Congestion present. No mucosal edema or rhinorrhea.      Right Sinus: No maxillary sinus tenderness or frontal sinus tenderness.      Left Sinus: No maxillary sinus tenderness or frontal sinus tenderness.      Mouth/Throat:      Mouth: Mucous membranes are moist. No oral lesions.      Pharynx: Oropharynx is clear.   Eyes:      General: No scleral icterus.     Extraocular Movements: Extraocular movements intact.      Conjunctiva/sclera: Conjunctivae normal.      Pupils: Pupils are equal, round, and reactive to light.   Neck:      Musculoskeletal: Neck supple.      Thyroid: No thyroid mass.   Cardiovascular:      Rate and Rhythm: Normal rate and regular rhythm.      Pulses: Normal  pulses.      Heart sounds: Normal heart sounds.   Pulmonary:      Effort: Pulmonary effort is normal.      Breath sounds: Wheezing (scattered exp wheeze) present. No decreased breath sounds, rhonchi or rales.   Musculoskeletal:      Right lower leg: No edema.      Left lower leg: No edema.   Lymphadenopathy:      Cervical: No cervical adenopathy.   Skin:     General: Skin is warm and dry.      Coloration: Skin is not jaundiced or pale.   Neurological:      Mental Status: She is alert and oriented to person, place, and time.      Cranial Nerves: Cranial nerves are intact.      Sensory: Sensation is intact.      Motor: Motor function is intact.      Gait: Gait is intact.   Psychiatric:         Mood and Affect: Affect normal. Mood is anxious (mildly).         Speech: Speech normal.         Behavior: Behavior normal. Behavior is cooperative.         Thought Content: Thought content normal.         Cognition and Memory: Cognition normal.         Judgment: Judgment normal.     Pt's previous physical exam reviewed and updated as indicated.    Lab Results   Component Value Date    WBC 11.65 (H) 08/20/2020    HGB 13.4 08/20/2020    HCT 39.9 08/20/2020    MCV 79.5 08/20/2020     08/20/2020     Lab Results   Component Value Date    GLUCOSE 103 (H) 08/20/2020    BUN 16 11/03/2020    CREATININE 0.59 11/03/2020    EGFRIFNONA 109 11/03/2020    EGFRIFAFRI 132 11/03/2020    BCR 27.1 (H) 11/03/2020    K 3.5 11/03/2020    CO2 28.5 11/03/2020    CALCIUM 9.8 11/03/2020    PROTENTOTREF 6.5 08/03/2020    ALBUMIN 4.70 08/03/2020    LABIL2 2.6 08/03/2020    AST 23 08/03/2020    ALT 29 08/03/2020     Lab Results   Component Value Date    TSH 1.790 08/03/2020         Assessment/Plan   Diagnoses and all orders for this visit:    1. Acute intractable headache, unspecified headache type (Primary)  -     butalbital-acetaminophen-caffeine (Esgic) -40 MG per tablet; 1-2 every 8 hours as needed for pain  Dispense: 12 tablet; Refill:  0  -     ketorolac (TORADOL) injection 60 mg  -     methylPREDNISolone acetate (DEPO-medrol) injection 80 mg    2. Uncontrolled hypertension  -     cloNIDine (CATAPRES) tablet 0.1 mg       We have discussed that her symptoms don't appear consistent with persistent sinusitis particularly after appropriate tx course for bacterial infection with limited improvement. Headache may in part be due to elevated BP. I also suspect she has either status migrainous or migraine/tension headaches. POC as above. I have reviewed risks/benefits and potential side effects of various treatment options. Pt voices understanding and wishes to proceed with trial of fioricet as above.    HTN is uncontrolled. Influenced by pain, excessive oral decongestant use, excessive NSAID use. 1 dose of clonidine given in office and pt rechecked- f/u reading 160/80. No focal neuro c/o or findings. Recommend tx pain as above, continue valsartan. STOP PSEUDOEPHEDRINE. STOP IBUPROFEN. She will get BP cuff from family member and monitor closely over next 24 hours. She is instructed to report back with status later this evening via [facility].    Recheck in office in 3-5 days, sooner if needed/instructed.  Patient was encouraged to keep me informed of any acute changes, lack of improvement, or any new concerning symptoms.  Pt is aware of reasons to seek emergent care.  Patient voiced understanding of all instructions and denied further questions.

## 2021-03-15 RX ORDER — TIZANIDINE HYDROCHLORIDE 6 MG/1
CAPSULE, GELATIN COATED ORAL
Qty: 270 CAPSULE | Refills: 0 | Status: SHIPPED | OUTPATIENT
Start: 2021-03-15 | End: 2021-05-05 | Stop reason: SDUPTHER

## 2021-04-22 ENCOUNTER — OFFICE VISIT (OUTPATIENT)
Dept: GASTROENTEROLOGY | Facility: CLINIC | Age: 46
End: 2021-04-22

## 2021-04-22 VITALS
DIASTOLIC BLOOD PRESSURE: 84 MMHG | BODY MASS INDEX: 41.65 KG/M2 | WEIGHT: 250 LBS | TEMPERATURE: 97.5 F | HEIGHT: 65 IN | SYSTOLIC BLOOD PRESSURE: 140 MMHG

## 2021-04-22 DIAGNOSIS — K52.9 CHRONIC DIARRHEA: ICD-10-CM

## 2021-04-22 DIAGNOSIS — K21.00 GASTROESOPHAGEAL REFLUX DISEASE WITH ESOPHAGITIS WITHOUT HEMORRHAGE: ICD-10-CM

## 2021-04-22 DIAGNOSIS — K76.0 NON-ALCOHOLIC FATTY LIVER DISEASE: ICD-10-CM

## 2021-04-22 DIAGNOSIS — Z79.1 LONG TERM (CURRENT) USE OF NON-STEROIDAL ANTI-INFLAMMATORIES (NSAID): ICD-10-CM

## 2021-04-22 DIAGNOSIS — R11.0 NAUSEA: Primary | ICD-10-CM

## 2021-04-22 DIAGNOSIS — E66.01 CLASS 3 SEVERE OBESITY DUE TO EXCESS CALORIES WITHOUT SERIOUS COMORBIDITY WITH BODY MASS INDEX (BMI) OF 40.0 TO 44.9 IN ADULT (HCC): ICD-10-CM

## 2021-04-22 DIAGNOSIS — R79.89 ELEVATED LFTS: ICD-10-CM

## 2021-04-22 DIAGNOSIS — R14.2 BURPING: ICD-10-CM

## 2021-04-22 DIAGNOSIS — Z12.11 ENCOUNTER FOR SCREENING FOR MALIGNANT NEOPLASM OF COLON: ICD-10-CM

## 2021-04-22 DIAGNOSIS — R68.81 EARLY SATIETY: ICD-10-CM

## 2021-04-22 PROCEDURE — 99204 OFFICE O/P NEW MOD 45 MIN: CPT | Performed by: INTERNAL MEDICINE

## 2021-04-22 RX ORDER — ONDANSETRON 4 MG/1
4 TABLET, FILM COATED ORAL EVERY 12 HOURS PRN
Qty: 30 TABLET | Refills: 2 | Status: SHIPPED | OUTPATIENT
Start: 2021-04-22 | End: 2021-08-04

## 2021-04-22 RX ORDER — CHOLESTYRAMINE LIGHT 4 G/5.7G
4 POWDER, FOR SUSPENSION ORAL 2 TIMES DAILY
Qty: 60 PACKET | Refills: 2 | Status: SHIPPED | OUTPATIENT
Start: 2021-04-22 | End: 2021-08-04

## 2021-04-22 NOTE — PROGRESS NOTES
New Patient Consult      Date: 2021   Patient Name: Madalyn Villegas  MRN: [id number]  : 1975     Referring Physician: Latrice Santana MD    Chief Complaint   Patient presents with   • Nausea       History of Present Illness: Madalyn Villegas is a 45 y.o. female who is here today to establish care with Gastroenterology for evaluation of nausea and early satiety.  Patient complaints of excessive burping with vomiting. She gets these episdes almost every day now. She feels like food stops at upper belly. She did have nausea before but that improved after surgery.  She occasionally gets mild belly discomfort min different places. She has longstanding relfux symptoms and takes ppi BID low dose. She stopped pops that did not help her.     She has diarrhea all the time since many years. She will have loose stool daily anywhere 4-5 per day. She feels bloated all the time. Lower abdominal pain gets better after defecation.  Pt denies any odynophagia or dysphagia. No recent change in bowel habit, hematochezia or melena.      There is no history of liver or pancreatic disease. There is no history of anemia.  She had an EGD done in 2021 by Dr. Nadine Lowe and revealed what appears to be salmon-colored mucosa at the GE junction and reactive gastropathy. . Her last colonoscopy was in 2017 by Dr. Loza and was normal. .  No family history of colon cancer. Uncle had stomach cancer.  No history of any abdominal surgery except cholecystectomy in  gallbladder polyp. Denies alcohol abuse or cigarette smoking ( ex-smoker )      Subjective      Past Medical History:   Past Medical History:   Diagnosis Date   • Abnormal EKG     Patient states that she saw a cardiologist for previous abnormal EKG, had a cardiac stress test that was normal, and the cardiologist told her that her EKG was abnormal because of her large breasts.  Patient states that she cannot remember the name of the cardiologist.   •  Asthma     ASTHMA LIKE SYMPTOMS WITH SINUS INFECTION   • Class 2 severe obesity due to excess calories with serious comorbidity and body mass index (BMI) of 35.0 to 35.9 in adult (CMS/[facility]) 7/18/2016   • GERD (gastroesophageal reflux disease)    • History of being tatooed     RIGHT ANKLE   • History of Papanicolaou smear of cervix 09/2017    [facility]       • Hyperlipidemia    • Hypertension    • Kidney stones    • Lumbar degenerative disc disease    • Migraine    • Piercing     bilateral ears   • Recurrent biliary colic 8/19/2020    Added automatically from request for surgery [id number]   • Tobacco abuse        Past Surgical History:   Past Surgical History:   Procedure Laterality Date   • CHOLECYSTECTOMY N/A 8/24/2020    Procedure: CHOLECYSTECTOMY LAPAROSCOPIC;  Surgeon: Amy Lowe MD;  Location: [facility] OR;  Service: General;  Laterality: N/A;   • COLONOSCOPY N/A 8/24/2017    Procedure: COLONOSCOPY;  Surgeon: Blas Thapa MD;  Location: [facility] ENDOSCOPY;  Service:    • D & C HYSTEROSCOPY ENDOMETRIAL ABLATION N/A 11/17/2017    Procedure: DILATATION AND CURETTAGE HYSTEROSCOPY NOVASURE ENDOMETRIAL ABLATION;  Surgeon: Marin Louie MD;  Location: [facility] OR;  Service:    • ENDOSCOPY N/A 8/24/2020    Procedure: ESOPHAGOGASTRODUODENOSCOPY WITH BIOPSY;  Surgeon: Amy Lowe MD;  Location: [facility] OR;  Service: General;  Laterality: N/A;   • KNEE ACL RECONSTRUCTION Right 07/2010   • WISDOM TOOTH EXTRACTION         Family History:   Family History   Problem Relation Age of Onset   • Arthritis Mother    • Hyperlipidemia Mother    • Hypertension Mother    • Obesity Mother    • Heart attack Mother 64   • Hypertension Father    • Heart disease Father    • Heart attack Sister 34   • Hyperlipidemia Sister    • Diabetes Sister    • Hypertension Sister    • Obesity Sister    • Heart disease Sister    • Heart failure Sister    • Obesity Sister    • Diabetes Sister    • Diabetes type II Sister    • Stomach  cancer Maternal Uncle    • Cancer Maternal Grandfather        Social History:   Social History     Socioeconomic History   • Marital status: Single     Spouse name: Not on file   • Number of children: Not on file   • Years of education: Not on file   • Highest education level: Not on file   Tobacco Use   • Smoking status: Former Smoker     Packs/day: 0.75     Years: 25.00     Pack years: 18.75     Types: Cigarettes     Quit date: 3/16/2019     Years since quittin.1   • Smokeless tobacco: Never Used   Vaping Use   • Vaping Use: Never used   Substance and Sexual Activity   • Alcohol use: No   • Drug use: No   • Sexual activity: Defer     Partners: Male     Birth control/protection: Surgical         Current Outpatient Medications:   •  acetaminophen (TYLENOL) 500 MG tablet, Take 500 mg by mouth every 6 (six) hours as needed for mild pain (1-3)., Disp: , Rfl:   •  albuterol sulfate  (90 Base) MCG/ACT inhaler, Inhale 2 puffs Every 4 (Four) Hours As Needed for Wheezing., Disp: 18 g, Rfl: 0  •  atorvastatin (LIPITOR) 10 MG tablet, Take 1 tablet by mouth Every Night., Disp: 90 tablet, Rfl: 1  •  chlorthalidone (HYGROTON) 25 MG tablet, Take 25 mg by mouth Daily., Disp: , Rfl:   •  Cholecalciferol (VITAMIN D3) 2000 units tablet, Take  by mouth., Disp: , Rfl:   •  DYMISTA 137-50 MCG/ACT suspension, 2 (two) times a day., Disp: , Rfl:   •  EPINEPHrine (EPIPEN) 0.3 MG/0.3ML solution auto-injector injection, , Disp: , Rfl:   •  esomeprazole (NexIUM) 20 MG capsule, Take 20 mg by mouth 2 (Two) Times a Day., Disp: , Rfl:   •  glucosamine-chondroitin 500-400 MG capsule capsule, Take 1 capsule by mouth 2 (Two) Times a Day With Meals., Disp: , Rfl:   •  meloxicam (MOBIC) 15 MG tablet, Take 1 tablet by mouth Daily., Disp: 90 tablet, Rfl: 1  •  montelukast (SINGULAIR) 10 MG tablet, Take 10 mg by mouth Daily As Needed., Disp: , Rfl:   •  potassium chloride (K-DUR,KLOR-CON) 20 MEQ CR tablet, Take 20 mEq by mouth Daily., Disp: ,  Rfl:   •  pseudoephedrine-guaifenesin (MUCINEX D)  MG per 12 hr tablet, Mucinex D 60 mg-600 mg tablet,extended release  1po QD x 4-5 days, then PRN congestion, Disp: , Rfl:   •  TiZANidine (ZANAFLEX) 6 MG capsule, TAKE 1 CAPSULE BY MOUTH THREE TIMES DAILY, Disp: 270 capsule, Rfl: 0  •  cholestyramine light 4 g packet, Take 1 packet by mouth 2 (Two) Times a Day., Disp: 60 packet, Rfl: 2  •  ondansetron (Zofran) 4 MG tablet, Take 1 tablet by mouth Every 12 (Twelve) Hours As Needed for Nausea or Vomiting., Disp: 30 tablet, Rfl: 2    Current Facility-Administered Medications:   •  cloNIDine (CATAPRES) tablet 0.1 mg, 0.1 mg, Oral, Q12H, Latrice Santana MD    No Known Allergies    Review of Systems:   Review of Systems   Constitutional: Negative for appetite change, fatigue, fever and unexpected weight loss.   HENT: Negative for trouble swallowing.    Respiratory: Negative for cough, shortness of breath and wheezing.    Cardiovascular: Negative for chest pain, palpitations and leg swelling.   Gastrointestinal: Positive for abdominal distention, diarrhea, nausea and vomiting. Negative for abdominal pain, anal bleeding, blood in stool, constipation, rectal pain, GERD and indigestion.        Abdominal bloating   Genitourinary: Negative for dysuria, frequency and hematuria.   Musculoskeletal: Negative for back pain and joint swelling.   Skin: Negative for color change, rash and skin lesions.   Neurological: Negative for dizziness, syncope, speech difficulty, weakness, headache and memory problem.   Hematological: Negative for adenopathy. Does not bruise/bleed easily.   Psychiatric/Behavioral: Negative for agitation, behavioral problems, suicidal ideas and depressed mood.       The following portions of the patient's history were reviewed and updated as appropriate: allergies, current medications, past family history, past medical history, past social history, past surgical history and problem list.    Objective  "    Physical Exam:  Vital Signs:   Vitals:    04/22/21 1342   BP: 140/84   Temp: 97.5 °F (36.4 °C)   TempSrc: Temporal   Weight: 113 kg (250 lb)   Height: 165.1 cm (65\")       Physical Exam  Vitals and nursing note reviewed.   Constitutional:       Appearance: She is well-developed. She is obese.   HENT:      Head: Normocephalic and atraumatic.      Right Ear: External ear normal.      Left Ear: External ear normal.   Eyes:      Conjunctiva/sclera: Conjunctivae normal.   Neck:      Thyroid: No thyromegaly.      Trachea: No tracheal deviation.   Cardiovascular:      Rate and Rhythm: Normal rate and regular rhythm.      Heart sounds: No murmur heard.     Pulmonary:      Effort: Pulmonary effort is normal. No respiratory distress.      Breath sounds: Normal breath sounds.   Abdominal:      General: Bowel sounds are normal. There is no distension.      Palpations: Abdomen is soft. There is no mass.      Tenderness: There is abdominal tenderness (Mild tenderness in the left upper abdomen).      Hernia: No hernia is present.   Musculoskeletal:         General: Normal range of motion.      Cervical back: Normal range of motion and neck supple.   Skin:     General: Skin is warm and dry.   Neurological:      Mental Status: She is alert and oriented to person, place, and time.      Cranial Nerves: No cranial nerve deficit.      Sensory: No sensory deficit.   Psychiatric:         Mood and Affect: Mood normal.         Behavior: Behavior normal.         Thought Content: Thought content normal.         Judgment: Judgment normal.         Results Review:   I have reviewed the patient's new clinical and imaging results and agree with the interpretation.     No visits with results within 90 Day(s) from this visit.   Latest known visit with results is:   Office Visit on 11/03/2020   Component Date Value Ref Range Status   • Glucose 11/03/2020 100* 65 - 99 mg/dL Final   • BUN 11/03/2020 16  6 - 20 mg/dL Final   • Creatinine 11/03/2020 " 0.59  0.57 - 1.00 mg/dL Final   • eGFR Non African Am 11/03/2020 109  >60 mL/min/1.73 Final   • eGFR African Am 11/03/2020 132  >60 mL/min/1.73 Final   • BUN/Creatinine Ratio 11/03/2020 27.1* 7.0 - 25.0 Final   • Sodium 11/03/2020 141  136 - 145 mmol/L Final   • Potassium 11/03/2020 3.5  3.5 - 5.2 mmol/L Final   • Chloride 11/03/2020 103  98 - 107 mmol/L Final   • Total CO2 11/03/2020 28.5  22.0 - 29.0 mmol/L Final   • Calcium 11/03/2020 9.8  8.6 - 10.5 mg/dL Final   • Magnesium 11/03/2020 1.8  1.6 - 2.6 mg/dL Final      No radiology results for the last 90 days.    Assessment / Plan      Assessment & Plan:  1. Nausea  2.  Excessive burping  3. Early satiety  4. Chronic diarrhea  5.  Abdominal bloating and abdominal cramps  Irritable bowel syndrome with the diarrhea / to rule out gastroparesis    Patient has a longstanding history of diarrhea for many years now.  He also gets significant abdominal bloating after he any meals and cramps mainly in the lower abdomen that gets better with the defecation.  She will have a bowel movement every 4-5 loose stools every day.  She also had a significant nausea with the excessive burping.  As per patient nausea got better after she had a gallbladder surgery last year but she still has excessive burping and vomiting.     Routine lab studies done did not reveal any significant normality.  She had an EGD done by Dr. 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  She appears to have had some erosive esophagitis.  Biopsies were negative for Black's.  She had a colonoscopy done in 2017 by Dr. Loza and was reported as normal.  Transverse colon biopsy did not reveal any signs of microscopic colitis.  Patient does not have any family history of IBD.   Her ultrasound abdomen done in 2020 revealed GB polyp and for which she had a gallbladder  removal    Her current main symptom is excessive burping and occasional vomiting but when she eats.  This could be constellation of her IBS symptoms however gastroparesis cannot be ruled out given her history bile noted in the stomach with the previous EGD.  Her duodenal biopsies done in 2020 did not reveal any signs of celiac disease she is an ex-smoker now stopped smoking.   She does drink excessive carbonated beverages now.  Advised to cut down and avoid any carbonated beverages for now  Advised to avoid cheese and daily foods.  Also discussed on a low gas-forming diet    We will get a stool for pancreatic elastase and calprotectin, stool for fecal fat for malabsorption  Celiac serology  We will also get a CBC CMP given her elevated liver enzymes in the past    Gastric emptying study to rule out gastroparesis  I have started her on Zofran 4 mg twice daily as needed for now.   We will also start her on cholestyramine 4 g p.o. twice daily  We will reassess in 12 weeks time.  We may consider Reglan if her upper GI symptoms does not improve with the Zofran  We had a discussion on losing weight today.  We discussed on a regular exercise.      - Calprotectin, Fecal - Stool, Per Rectum; Future  - Pancreatic Elastase, Fecal - Stool, Per Rectum; Future  - Fecal Fat, Qualitative - Stool, Per Rectum; Future  - NM Gastric Emptying; Future  - Comprehensive Metabolic Panel; Future  - CBC Auto Differential; Future  - IgA; Future  - Tissue Transglutaminase, IgA; Future    4. Long term (current) use of non-steroidal anti-inflammatories (nsaid)  5. Gastroesophageal reflux disease with esophagitis without hemorrhage  She is currently on a Protonix 20 g p.o. twice daily.  As per patient she cannot miss even a single dose she will get significant reflux symptoms.   EGD done in 2020 revealed what appears to be variable Z-line with esophagitis.  No Black's identified  We will continue PPI low-dose twice daily for now    6.  Non-alcoholic fatty liver diseas   7. Class 3 severe obesity due to excess calories without serious comorbidity with body mass index (BMI) of 40.0 to 44.9 in adult (CMS/HCC)  8. Elevated LFTs  Patient has a fluctuating borderline elevated liver enzymes.   We will repeat CMP now, if any significant elevations liver enzymes she will need a full liver work-up  Advised regular exercise half hour every day at least 3 days in a week.  Advised to lose weight  Reduced calorie intake  and lifestyle and dietary changes  have been discussed  Advised to avoid alcohol and smoking cigarette    9. Encounter for screening for malignant neoplasm of colon  Last colonoscopy was in 2017 by Dr. Loza and reported was normal  Based on the above report she needs repeat colonoscopy in 2027 or earlier            Follow Up:   No follow-ups on file.    Merary Holliday MD  [facility]  4/22/2021  14:34 EDT    Please note that portions of this note may have been completed with a voice recognition program.

## 2021-04-22 NOTE — PATIENT INSTRUCTIONS
Low-FODMAP Eating Plan    FODMAPs (fermentable oligosaccharides, disaccharides, monosaccharides, and polyols) are sugars that are hard for some people to digest. A low-FODMAP eating plan may help some people who have bowel (intestinal) diseases to manage their symptoms.  This meal plan can be complicated to follow. Work with a diet and nutrition specialist (dietitian) to make a low-FODMAP eating plan that is right for you. A dietitian can make sure that you get enough nutrition from this diet.  What are tips for following this plan?  Reading food labels  · Check labels for hidden FODMAPs such as:  ? High-fructose syrup.  ? Honey.  ? Agave.  ? Natural fruit flavors.  ? Onion or garlic powder.  · Choose low-FODMAP foods that contain 3-4 grams of fiber per serving.  · Check food labels for serving sizes. Eat only one serving at a time to make sure FODMAP levels stay low.  Meal planning  · Follow a low-FODMAP eating plan for up to 6 weeks, or as told by your health care provider or dietitian.  · To follow the eating plan:  1. Eliminate high-FODMAP foods from your diet completely.  2. Gradually reintroduce high-FODMAP foods into your diet one at a time. Most people should wait a few days after introducing one high-FODMAP food before they introduce the next high-FODMAP food. Your dietitian can recommend how quickly you may reintroduce foods.  3. Keep a daily record of what you eat and drink, and make note of any symptoms that you have after eating.  4. Review your daily record with a dietitian regularly. Your dietitian can help you identify which foods you can eat and which foods you should avoid.  General tips  · Drink enough fluid each day to keep your urine pale yellow.  · Avoid processed foods. These often have added sugar and may be high in FODMAPs.  · Avoid most dairy products, whole grains, and sweeteners.  · Work with a dietitian to make sure you get enough fiber in your diet.  Recommended  "foods  Grains  · Gluten-free grains, such as rice, oats, buckwheat, quinoa, corn, polenta, and millet. Gluten-free pasta, bread, or cereal. Rice noodles. Corn tortillas.  Vegetables  · Eggplant, zucchini, cucumber, peppers, green beans, [place] sprouts, bean sprouts, lettuce, arugula, kale, Swiss chard, spinach, renaldo greens, bok ravindra, summer squash, potato, and tomato. Limited amounts of corn, carrot, and sweet potato. Green parts of scallions.  Fruits  · Bananas, oranges, zeinab, limes, blueberries, raspberries, strawberries, grapes, cantaloupe, honeydew melon, kiwi, papaya, passion fruit, and pineapple. Limited amounts of dried cranberries, banana chips, and shredded coconut.  Dairy  · Lactose-free milk, yogurt, and kefir. Lactose-free cottage cheese and ice cream. Non-dairy milks, such as almond, coconut, hemp, and rice milk. Yogurts made of non-dairy milks. Limited amounts of goat cheese, brie, mozzarella, parmesan, swiss, and other hard cheeses.  Meats and other protein foods  · Unseasoned beef, pork, poultry, or fish. Eggs. Lindo. Tofu (firm) and tempeh. Limited amounts of nuts and seeds, such as almonds, walnuts, brazil nuts, pecans, peanuts, pumpkin seeds, wagner seeds, and sunflower seeds.  Fats and oils  · Butter-free spreads. Vegetable oils, such as olive, canola, and sunflower oil.  Seasoning and other foods  · Artificial sweeteners with names that do not end in \"ol\" such as aspartame, saccharine, and stevia. Maple syrup, white table sugar, raw sugar, brown sugar, and molasses. Fresh basil, coriander, parsley, rosemary, and thyme.  Beverages  · Water and mineral water. Sugar-sweetened soft drinks. Small amounts of orange juice or cranberry juice. Black and green tea. Most dry lenin. Coffee.  This may not be a complete list of low-FODMAP foods. Talk with your dietitian for more information.  Foods to avoid  Grains  · Wheat, including kamut, durum, and semolina. Barley and bulgur. Couscous. Wheat-based " cereals. Wheat noodles, bread, crackers, and pastries.  Vegetables  · Chicory root, artichoke, asparagus, cabbage, snow peas, sugar snap peas, mushrooms, and cauliflower. Onions, garlic, leeks, and the white part of scallions.  Fruits  · Fresh, dried, and juiced forms of apple, pear, watermelon, peach, plum, cherries, apricots, blackberries, boysenberries, figs, nectarines, and toño. Avocado.  Dairy  · Milk, yogurt, ice cream, and soft cheese. Cream and sour cream. Milk-based sauces. Custard.  Meats and other protein foods  · Fried or fatty meat. Sausage. Cashews and pistachios. Soybeans, baked beans, black beans, chickpeas, kidney beans, dimitri beans, navy beans, lentils, and split peas.  Seasoning and other foods  · Any sugar-free gum or candy. Foods that contain artificial sweeteners such as sorbitol, mannitol, isomalt, or xylitol. Foods that contain honey, high-fructose corn syrup, or agave. Bouillon, vegetable stock, beef stock, and chicken stock. Garlic and onion powder. Condiments made with onion, such as hummus, chutney, pickles, relish, salad dressing, and salsa. Tomato paste.  Beverages  · Chicory-based drinks. Coffee substitutes. Chamomile tea. Fennel tea. Sweet or fortified lenin such as port or alton. Diet soft drinks made with isomalt, mannitol, maltitol, sorbitol, or xylitol. Apple, pear, and toño juice. Juices with high-fructose corn syrup.  This may not be a complete list of high-FODMAP foods. Talk with your dietitian to discuss what dietary choices are best for you.   Summary  · A low-FODMAP eating plan is a short-term diet that eliminates FODMAPs from your diet to help ease symptoms of certain bowel diseases.  · The eating plan usually lasts up to 6 weeks. After that, high-FODMAP foods are restarted gradually, one at a time, so you can find out which may be causing symptoms.  · A low-FODMAP eating plan can be complicated. It is best to work with a dietitian who has experience with this type of  plan.  This information is not intended to replace advice given to you by your health care provider. Make sure you discuss any questions you have with your health care provider.  Document Revised: 11/30/2018 Document Reviewed: 08/14/2018  Elsevier Patient Education © 2021 [employer]

## 2021-05-05 DIAGNOSIS — E78.49 OTHER HYPERLIPIDEMIA: ICD-10-CM

## 2021-05-05 DIAGNOSIS — M79.7 FIBROMYALGIA: ICD-10-CM

## 2021-05-05 RX ORDER — POTASSIUM CHLORIDE 20 MEQ/1
20 TABLET, EXTENDED RELEASE ORAL DAILY
Qty: 90 TABLET | Refills: 0 | Status: SHIPPED | OUTPATIENT
Start: 2021-05-05 | End: 2021-08-27

## 2021-05-05 RX ORDER — TIZANIDINE HYDROCHLORIDE 6 MG/1
6 CAPSULE, GELATIN COATED ORAL 3 TIMES DAILY
Qty: 270 CAPSULE | Refills: 0 | Status: SHIPPED | OUTPATIENT
Start: 2021-05-05 | End: 2022-05-16 | Stop reason: SDUPTHER

## 2021-05-05 RX ORDER — ATORVASTATIN CALCIUM 10 MG/1
10 TABLET, FILM COATED ORAL NIGHTLY
Qty: 90 TABLET | Refills: 1 | Status: SHIPPED | OUTPATIENT
Start: 2021-05-05 | End: 2021-10-29

## 2021-05-05 RX ORDER — CHLORTHALIDONE 25 MG/1
25 TABLET ORAL DAILY
Qty: 90 TABLET | Refills: 0 | Status: SHIPPED | OUTPATIENT
Start: 2021-05-05 | End: 2021-06-01

## 2021-05-06 RX ORDER — MELOXICAM 15 MG/1
TABLET ORAL
Qty: 90 TABLET | Refills: 0 | Status: SHIPPED | OUTPATIENT
Start: 2021-05-06 | End: 2021-05-07 | Stop reason: SDUPTHER

## 2021-05-07 RX ORDER — MELOXICAM 15 MG/1
15 TABLET ORAL DAILY
Qty: 90 TABLET | Refills: 0 | Status: SHIPPED | OUTPATIENT
Start: 2021-05-07 | End: 2021-11-08

## 2021-05-12 ENCOUNTER — LAB (OUTPATIENT)
Dept: LAB | Facility: HOSPITAL | Age: 46
End: 2021-05-12

## 2021-05-12 ENCOUNTER — HOSPITAL ENCOUNTER (OUTPATIENT)
Dept: NUCLEAR MEDICINE | Facility: HOSPITAL | Age: 46
Discharge: HOME | End: 2021-05-12

## 2021-05-12 DIAGNOSIS — R68.81 EARLY SATIETY: ICD-10-CM

## 2021-05-12 DIAGNOSIS — R11.0 NAUSEA: ICD-10-CM

## 2021-05-12 DIAGNOSIS — R79.89 ELEVATED LFTS: ICD-10-CM

## 2021-05-12 DIAGNOSIS — K52.9 CHRONIC DIARRHEA: ICD-10-CM

## 2021-05-12 LAB
ALBUMIN SERPL-MCNC: 4.4 G/DL (ref 3.5–5.2)
ALBUMIN/GLOB SERPL: 1.9 G/DL
ALP SERPL-CCNC: 73 U/L (ref 39–117)
ALT SERPL W P-5'-P-CCNC: 39 U/L (ref 1–33)
ANION GAP SERPL CALCULATED.3IONS-SCNC: 12.9 MMOL/L (ref 5–15)
AST SERPL-CCNC: 29 U/L (ref 1–32)
BASOPHILS # BLD AUTO: 0.07 10*3/MM3 (ref 0–0.2)
BASOPHILS NFR BLD AUTO: 0.7 % (ref 0–1.5)
BILIRUB SERPL-MCNC: 0.5 MG/DL (ref 0–1.2)
BUN SERPL-MCNC: 17 MG/DL (ref 6–20)
BUN/CREAT SERPL: 29.3 (ref 7–25)
CALCIUM SPEC-SCNC: 10 MG/DL (ref 8.6–10.5)
CHLORIDE SERPL-SCNC: 99 MMOL/L (ref 98–107)
CO2 SERPL-SCNC: 28.1 MMOL/L (ref 22–29)
CREAT SERPL-MCNC: 0.58 MG/DL (ref 0.57–1)
DEPRECATED RDW RBC AUTO: 38.8 FL (ref 37–54)
EOSINOPHIL # BLD AUTO: 0.29 10*3/MM3 (ref 0–0.4)
EOSINOPHIL NFR BLD AUTO: 3 % (ref 0.3–6.2)
ERYTHROCYTE [DISTWIDTH] IN BLOOD BY AUTOMATED COUNT: 13.2 % (ref 12.3–15.4)
GFR SERPL CREATININE-BSD FRML MDRD: 111 ML/MIN/1.73
GLOBULIN UR ELPH-MCNC: 2.3 GM/DL
GLUCOSE SERPL-MCNC: 101 MG/DL (ref 65–99)
HCT VFR BLD AUTO: 43.6 % (ref 34–46.6)
HGB BLD-MCNC: 14.2 G/DL (ref 12–15.9)
IGA1 MFR SER: 88 MG/DL (ref 70–400)
IMM GRANULOCYTES # BLD AUTO: 0.05 10*3/MM3 (ref 0–0.05)
IMM GRANULOCYTES NFR BLD AUTO: 0.5 % (ref 0–0.5)
LYMPHOCYTES # BLD AUTO: 2.07 10*3/MM3 (ref 0.7–3.1)
LYMPHOCYTES NFR BLD AUTO: 21.6 % (ref 19.6–45.3)
MCH RBC QN AUTO: 26.4 PG (ref 26.6–33)
MCHC RBC AUTO-ENTMCNC: 32.6 G/DL (ref 31.5–35.7)
MCV RBC AUTO: 81.2 FL (ref 79–97)
MONOCYTES # BLD AUTO: 0.65 10*3/MM3 (ref 0.1–0.9)
MONOCYTES NFR BLD AUTO: 6.8 % (ref 5–12)
NEUTROPHILS NFR BLD AUTO: 6.44 10*3/MM3 (ref 1.7–7)
NEUTROPHILS NFR BLD AUTO: 67.4 % (ref 42.7–76)
NRBC BLD AUTO-RTO: 0 /100 WBC (ref 0–0.2)
PLATELET # BLD AUTO: 262 10*3/MM3 (ref 140–450)
PMV BLD AUTO: 12.2 FL (ref 6–12)
POTASSIUM SERPL-SCNC: 3.8 MMOL/L (ref 3.5–5.2)
PROT SERPL-MCNC: 6.7 G/DL (ref 6–8.5)
RBC # BLD AUTO: 5.37 10*6/MM3 (ref 3.77–5.28)
SODIUM SERPL-SCNC: 140 MMOL/L (ref 136–145)
WBC # BLD AUTO: 9.57 10*3/MM3 (ref 3.4–10.8)

## 2021-05-12 PROCEDURE — 80053 COMPREHEN METABOLIC PANEL: CPT

## 2021-05-12 PROCEDURE — 82656 EL-1 FECAL QUAL/SEMIQ: CPT

## 2021-05-12 PROCEDURE — 83993 ASSAY FOR CALPROTECTIN FECAL: CPT

## 2021-05-12 PROCEDURE — 85025 COMPLETE CBC W/AUTO DIFF WBC: CPT

## 2021-05-12 PROCEDURE — 0 TECHNETIUM SULFUR COLLOID: Performed by: INTERNAL MEDICINE

## 2021-05-12 PROCEDURE — A9541 TC99M SULFUR COLLOID: HCPCS | Performed by: INTERNAL MEDICINE

## 2021-05-12 PROCEDURE — 36415 COLL VENOUS BLD VENIPUNCTURE: CPT

## 2021-05-12 PROCEDURE — 82784 ASSAY IGA/IGD/IGG/IGM EACH: CPT

## 2021-05-12 PROCEDURE — 78264 GASTRIC EMPTYING IMG STUDY: CPT

## 2021-05-12 PROCEDURE — 83516 IMMUNOASSAY NONANTIBODY: CPT

## 2021-05-12 PROCEDURE — 89125 SPECIMEN FAT STAIN: CPT

## 2021-05-12 RX ADMIN — TECHNETIUM TC 99M SULFUR COLLOID 1 DOSE: KIT at 09:00

## 2021-05-13 LAB
FATTY ACIDS: NORMAL
NEUTRAL FATS: NORMAL
TTG IGA SER-ACNC: <2 U/ML (ref 0–3)

## 2021-05-14 LAB — CALPROTECTIN STL-MCNT: 138 UG/G (ref 0–120)

## 2021-05-15 LAB — ELASTASE PANC STL-MCNT: >500 UG ELAST./G

## 2021-06-01 RX ORDER — CHLORTHALIDONE 25 MG/1
25 TABLET ORAL DAILY
Qty: 90 TABLET | Refills: 0 | Status: SHIPPED | OUTPATIENT
Start: 2021-06-01 | End: 2021-11-08

## 2021-06-01 RX ORDER — CHLORTHALIDONE 25 MG/1
TABLET ORAL
Qty: 90 TABLET | Refills: 0 | Status: SHIPPED | OUTPATIENT
Start: 2021-06-01 | End: 2021-06-01

## 2021-08-04 ENCOUNTER — OFFICE VISIT (OUTPATIENT)
Dept: GASTROENTEROLOGY | Facility: CLINIC | Age: 46
End: 2021-08-04

## 2021-08-04 VITALS
DIASTOLIC BLOOD PRESSURE: 80 MMHG | BODY MASS INDEX: 43.82 KG/M2 | TEMPERATURE: 98.4 F | SYSTOLIC BLOOD PRESSURE: 128 MMHG | WEIGHT: 263 LBS | HEIGHT: 65 IN

## 2021-08-04 DIAGNOSIS — K76.0 NONALCOHOLIC FATTY LIVER DISEASE: ICD-10-CM

## 2021-08-04 DIAGNOSIS — K21.9 GASTROESOPHAGEAL REFLUX DISEASE WITHOUT ESOPHAGITIS: ICD-10-CM

## 2021-08-04 DIAGNOSIS — R11.0 NAUSEA: ICD-10-CM

## 2021-08-04 DIAGNOSIS — K52.9 CHRONIC DIARRHEA: Primary | ICD-10-CM

## 2021-08-04 DIAGNOSIS — K58.0 IRRITABLE BOWEL SYNDROME WITH DIARRHEA: ICD-10-CM

## 2021-08-04 PROCEDURE — 99214 OFFICE O/P EST MOD 30 MIN: CPT | Performed by: INTERNAL MEDICINE

## 2021-08-04 RX ORDER — ONDANSETRON 4 MG/1
4 TABLET, FILM COATED ORAL EVERY 12 HOURS PRN
Qty: 30 TABLET | Refills: 2 | Status: SHIPPED | OUTPATIENT
Start: 2021-08-04 | End: 2022-05-23

## 2021-08-04 RX ORDER — DIPHENOXYLATE HYDROCHLORIDE AND ATROPINE SULFATE 2.5; .025 MG/1; MG/1
0.5 TABLET ORAL 2 TIMES DAILY PRN
Qty: 30 TABLET | Refills: 5 | Status: SHIPPED | OUTPATIENT
Start: 2021-08-04 | End: 2022-05-23

## 2021-08-04 NOTE — PROGRESS NOTES
Follow Up Note     Date: 2021   Patient Name: Madalyn Villegas  MRN: [id number]  : 1975     Referring Physician: Latrice Santana MD    Chief Complaint:    Chief Complaint   Patient presents with   • Follow-up   • Nausea       Interval History:   2021  Madalyn Villegas is a 46 y.o. female who is here today for follow up for her ongoing nausea vomiting.  She states that she regurgitates after eating anything.  However recently she started not moving anywhere after eating that helped and she did not vomit anything.  She also states that after taking the cholestyramine she started getting constipation and she was getting also abdominal cramps for which she stopped the cholestyramine.  She had a recent gastric emptying study she is here to discuss the reports.    2020  Madalyn Villegas is a 45 y.o. female who is here today to establish care with Gastroenterology for evaluation of nausea and early satiety.  Patient complaints of excessive burping with vomiting. She gets these episdes almost every day now. She feels like food stops at upper belly. She did have nausea before but that improved after surgery.  She occasionally gets mild belly discomfort min different places. She has longstanding relfux symptoms and takes ppi BID low dose. She stopped pops that did not help her.     She has diarrhea all the time since many years. She will have loose stool daily anywhere 4-5 per day. She feels bloated all the time. Lower abdominal pain gets better after defecation.  Pt denies any odynophagia or dysphagia. No recent change in bowel habit, hematochezia or melena.       There is no history of liver or pancreatic disease. There is no history of anemia.  She had an EGD done in 2021 by Dr. Nadine Lowe and revealed what appears to be salmon-colored mucosa at the GE junction and reactive gastropathy. . Her last colonoscopy was in 2017 by Dr. Loza and was normal. .  No family history of colon  cancer. Uncle had stomach cancer.  No history of any abdominal surgery except cholecystectomy in 2020 gallbladder polyp. Denies alcohol abuse or cigarette smoking ( ex-smoker )       Subjective      Past Medical History:   Past Medical History:   Diagnosis Date   • Abnormal EKG     Patient states that she saw a cardiologist for previous abnormal EKG, had a cardiac stress test that was normal, and the cardiologist told her that her EKG was abnormal because of her large breasts.  Patient states that she cannot remember the name of the cardiologist.   • Asthma     ASTHMA LIKE SYMPTOMS WITH SINUS INFECTION   • Class 2 severe obesity due to excess calories with serious comorbidity and body mass index (BMI) of 35.0 to 35.9 in adult (CMS/[facility]) 7/18/2016   • GERD (gastroesophageal reflux disease)    • History of being tatooed     RIGHT ANKLE   • History of Papanicolaou smear of cervix 09/2017    [facility]       • Hyperlipidemia    • Hypertension    • Kidney stones    • Lumbar degenerative disc disease    • Migraine    • Piercing     bilateral ears   • Recurrent biliary colic 8/19/2020    Added automatically from request for surgery [id number]   • Tobacco abuse      Past Surgical History:   Past Surgical History:   Procedure Laterality Date   • CHOLECYSTECTOMY N/A 8/24/2020    Procedure: CHOLECYSTECTOMY LAPAROSCOPIC;  Surgeon: Amy Lowe MD;  Location: [facility] OR;  Service: General;  Laterality: N/A;   • COLONOSCOPY N/A 8/24/2017    Procedure: COLONOSCOPY;  Surgeon: Blas Thapa MD;  Location: [facility] ENDOSCOPY;  Service:    • D & C HYSTEROSCOPY ENDOMETRIAL ABLATION N/A 11/17/2017    Procedure: DILATATION AND CURETTAGE HYSTEROSCOPY NOVASURE ENDOMETRIAL ABLATION;  Surgeon: Marin Louie MD;  Location: [facility] OR;  Service:    • ENDOSCOPY N/A 8/24/2020    Procedure: ESOPHAGOGASTRODUODENOSCOPY WITH BIOPSY;  Surgeon: Amy Lowe MD;  Location: [facility] OR;  Service: General;  Laterality: N/A;   • KNEE ACL  RECONSTRUCTION Right 2010   • WISDOM TOOTH EXTRACTION         Family History:   Family History   Problem Relation Age of Onset   • Arthritis Mother    • Hyperlipidemia Mother    • Hypertension Mother    • Obesity Mother    • Heart attack Mother 64   • Hypertension Father    • Heart disease Father    • Heart attack Sister 34   • Hyperlipidemia Sister    • Diabetes Sister    • Hypertension Sister    • Obesity Sister    • Heart disease Sister    • Heart failure Sister    • Obesity Sister    • Diabetes Sister    • Diabetes type II Sister    • Stomach cancer Maternal Uncle    • Cancer Maternal Grandfather        Social History:   Social History     Socioeconomic History   • Marital status: Single     Spouse name: Not on file   • Number of children: Not on file   • Years of education: Not on file   • Highest education level: Not on file   Tobacco Use   • Smoking status: Former Smoker     Packs/day: 0.75     Years: 25.00     Pack years: 18.75     Types: Cigarettes     Quit date: 3/16/2019     Years since quittin.3   • Smokeless tobacco: Never Used   Vaping Use   • Vaping Use: Never used   Substance and Sexual Activity   • Alcohol use: No   • Drug use: No   • Sexual activity: Defer     Partners: Male     Birth control/protection: Surgical       Medications:     Current Outpatient Medications:   •  acetaminophen (TYLENOL) 500 MG tablet, Take 500 mg by mouth every 6 (six) hours as needed for mild pain (1-3)., Disp: , Rfl:   •  albuterol sulfate  (90 Base) MCG/ACT inhaler, Inhale 2 puffs Every 4 (Four) Hours As Needed for Wheezing., Disp: 18 g, Rfl: 0  •  atorvastatin (LIPITOR) 10 MG tablet, Take 1 tablet by mouth Every Night., Disp: 90 tablet, Rfl: 1  •  chlorthalidone (HYGROTON) 25 MG tablet, Take 1 tablet by mouth Daily., Disp: 90 tablet, Rfl: 0  •  Cholecalciferol (VITAMIN D3) 2000 units tablet, Take  by mouth., Disp: , Rfl:   •  DYMISTA 137-50 MCG/ACT suspension, 2 (two) times a day., Disp: , Rfl:   •   "EPINEPHrine (EPIPEN) 0.3 MG/0.3ML solution auto-injector injection, , Disp: , Rfl:   •  esomeprazole (NexIUM) 20 MG capsule, Take 20 mg by mouth 2 (Two) Times a Day., Disp: , Rfl:   •  glucosamine-chondroitin 500-400 MG capsule capsule, Take 1 capsule by mouth 2 (Two) Times a Day With Meals., Disp: , Rfl:   •  meloxicam (MOBIC) 15 MG tablet, Take 1 tablet by mouth Daily., Disp: 90 tablet, Rfl: 0  •  potassium chloride (K-DUR,KLOR-CON) 20 MEQ CR tablet, Take 1 tablet by mouth Daily., Disp: 90 tablet, Rfl: 0  •  pseudoephedrine-guaifenesin (MUCINEX D)  MG per 12 hr tablet, Mucinex D 60 mg-600 mg tablet,extended release  1po QD x 4-5 days, then PRN congestion, Disp: , Rfl:   •  TiZANidine (ZANAFLEX) 6 MG capsule, Take 1 capsule by mouth 3 (Three) Times a Day., Disp: 270 capsule, Rfl: 0  •  cholestyramine light 4 g packet, Take 1 packet by mouth 2 (Two) Times a Day., Disp: 60 packet, Rfl: 2    Current Facility-Administered Medications:   •  cloNIDine (CATAPRES) tablet 0.1 mg, 0.1 mg, Oral, Q12H, Latrice Santana MD    Allergies:   No Known Allergies    Review of Systems:   Review of Systems   Constitutional: Negative for appetite change, fatigue, fever and unexpected weight loss.   HENT: Negative for trouble swallowing.    Gastrointestinal: Positive for abdominal pain, nausea and vomiting. Negative for abdominal distention, anal bleeding, blood in stool, constipation, diarrhea, rectal pain, GERD and indigestion.       The following portions of the patient's history were reviewed and updated as appropriate: allergies, current medications, past family history, past medical history, past social history, past surgical history and problem list.    Objective     Physical Exam:  Vital Signs:   Vitals:    08/04/21 1619   BP: 128/80   Temp: 98.4 °F (36.9 °C)   TempSrc: Temporal   Weight: 119 kg (263 lb)   Height: 165.1 cm (65\")       Physical Exam  Vitals and nursing note reviewed.   Constitutional:       Appearance: " Normal appearance. She is well-developed. She is obese.   HENT:      Head: Normocephalic and atraumatic.   Eyes:      Conjunctiva/sclera: Conjunctivae normal.   Cardiovascular:      Rate and Rhythm: Normal rate and regular rhythm.   Abdominal:      General: Bowel sounds are normal. There is no distension.      Palpations: Abdomen is soft. There is no mass.      Tenderness: There is no abdominal tenderness. There is no guarding or rebound.      Hernia: No hernia is present.   Musculoskeletal:      Cervical back: Normal range of motion and neck supple.   Neurological:      Mental Status: She is alert.         Results Review:   I reviewed the patient's new clinical results.    Lab on 05/12/2021   Component Date Value Ref Range Status   • Calprotectin, Fecal 05/12/2021 138* 0 - 120 ug/g Final    Concentration     Interpretation   Follow-Up  <16 - 50 ug/g     Normal           None  >50 -120 ug/g     Borderline       Re-evaluate in 4-6 weeks      >120 ug/g     Abnormal         Repeat as clinically                                     indicated   • Glucose 05/12/2021 101* 65 - 99 mg/dL Final   • BUN 05/12/2021 17  6 - 20 mg/dL Final   • Creatinine 05/12/2021 0.58  0.57 - 1.00 mg/dL Final   • Sodium 05/12/2021 140  136 - 145 mmol/L Final   • Potassium 05/12/2021 3.8  3.5 - 5.2 mmol/L Final   • Chloride 05/12/2021 99  98 - 107 mmol/L Final   • CO2 05/12/2021 28.1  22.0 - 29.0 mmol/L Final   • Calcium 05/12/2021 10.0  8.6 - 10.5 mg/dL Final   • Total Protein 05/12/2021 6.7  6.0 - 8.5 g/dL Final   • Albumin 05/12/2021 4.40  3.50 - 5.20 g/dL Final   • ALT (SGPT) 05/12/2021 39* 1 - 33 U/L Final   • AST (SGOT) 05/12/2021 29  1 - 32 U/L Final   • Alkaline Phosphatase 05/12/2021 73  39 - 117 U/L Final   • Total Bilirubin 05/12/2021 0.5  0.0 - 1.2 mg/dL Final   • eGFR Non African Amer 05/12/2021 111  >60 mL/min/1.73 Final   • Globulin 05/12/2021 2.3  gm/dL Final   • A/G Ratio 05/12/2021 1.9  g/dL Final   • BUN/Creatinine Ratio  05/12/2021 29.3* 7.0 - 25.0 Final   • Anion Gap 05/12/2021 12.9  5.0 - 15.0 mmol/L Final   • WBC 05/12/2021 9.57  3.40 - 10.80 10*3/mm3 Final   • RBC 05/12/2021 5.37* 3.77 - 5.28 10*6/mm3 Final   • Hemoglobin 05/12/2021 14.2  12.0 - 15.9 g/dL Final   • Hematocrit 05/12/2021 43.6  34.0 - 46.6 % Final   • MCV 05/12/2021 81.2  79.0 - 97.0 fL Final   • MCH 05/12/2021 26.4* 26.6 - 33.0 pg Final   • MCHC 05/12/2021 32.6  31.5 - 35.7 g/dL Final   • RDW 05/12/2021 13.2  12.3 - 15.4 % Final   • RDW-SD 05/12/2021 38.8  37.0 - 54.0 fl Final   • MPV 05/12/2021 12.2* 6.0 - 12.0 fL Final   • Platelets 05/12/2021 262  140 - 450 10*3/mm3 Final   • Neutrophil % 05/12/2021 67.4  42.7 - 76.0 % Final   • Lymphocyte % 05/12/2021 21.6  19.6 - 45.3 % Final   • Monocyte % 05/12/2021 6.8  5.0 - 12.0 % Final   • Eosinophil % 05/12/2021 3.0  0.3 - 6.2 % Final   • Basophil % 05/12/2021 0.7  0.0 - 1.5 % Final   • Immature Grans % 05/12/2021 0.5  0.0 - 0.5 % Final   • Neutrophils, Absolute 05/12/2021 6.44  1.70 - 7.00 10*3/mm3 Final   • Lymphocytes, Absolute 05/12/2021 2.07  0.70 - 3.10 10*3/mm3 Final   • Monocytes, Absolute 05/12/2021 0.65  0.10 - 0.90 10*3/mm3 Final   • Eosinophils, Absolute 05/12/2021 0.29  0.00 - 0.40 10*3/mm3 Final   • Basophils, Absolute 05/12/2021 0.07  0.00 - 0.20 10*3/mm3 Final   • Immature Grans, Absolute 05/12/2021 0.05  0.00 - 0.05 10*3/mm3 Final   • nRBC 05/12/2021 0.0  0.0 - 0.2 /100 WBC Final   • IgA 05/12/2021 88  70 - 400 mg/dL Final   • Tissue Transglutaminase IgA 05/12/2021 <2  0 - 3 U/mL Final                                  Negative        0 -  3                                Weak Positive   4 - 10                                Positive           >10   Tissue Transglutaminase (tTG) has been identified   as the endomysial antigen.  Studies have demonstr-   ated that endomysial IgA antibodies have over 99%   specificity for gluten sensitive enteropathy.   • Pancreatic Fecal 05/12/2021 >500  >200 ug Elast./g  Final           Severe Pancreatic Insufficiency:          <100         Moderate Pancreatic Insufficiency:   100 - 200         Normal:                                   >200   • Fecal Fats, Qualitative, Fatty Aci* 05/12/2021 0-100 fat droplets / hpf  0-100 fat droplets / hpf Final   • Fecal Fat Qualitative, Neutral Fats 05/12/2021 0-50 fat droplets / hpf  0-50 fat droplets / hpf Final      NM Gastric Emptying    Result Date: 5/12/2021  Normal T-1/2.  No evidence of gastroparesis or gastric outlet obstruction.      Images were reviewed, interpreted, and dictated by Dr. Tanner Villareal MD Transcribed by Katharine Oliveira PA-C.  This report was finalized on 5/12/2021 1:47 PM by Tanner Villareal MD.      Assessment / Plan      1. Nausea  2.  Excessive burping/ Food regurgitation.   3. Chronic diarrhea  4.  Abdominal bloating and abdominal cramps  5.  Irritable bowel syndrome with diarrhea   8/4/2021  Her recent gastric emptying study is negative.  She still will have episodic regurgitation of food.  Most of her symptoms are functional.  Unfortunately patient is gaining weight and gained at least 13 pounds since the last visit which is not helping her either.  She could not stick to the dietary changes.  Diarrhea improved with cholestyramine but caused constipation and abdominal cramps and she stopped the medication.  Her stool for pancreatic elastase was normal.  She had a borderline fecal calprotectin at 138 which is not convincing of any inflammatory process.. Otherwise stool studies were all negative.  Celiac serologies was negative    She will now have loose stool anywhere 3-5 times per day.   We will start her on Lomotil half a pill p.o. twice daily as needed  Zofran 4 mg p.o. twice daily as needed  Patient needs a colonoscopy and chronic biopsies to rule out microscopic colitis if her symptoms persist.  She may also need a CT scan of the abdomen pelvis for further evaluation of her abdominal pain.  We will reassess  again    4/22/2020   irritable bowel syndrome with the diarrhea / to rule out gastroparesis  Patient has a longstanding history of diarrhea for many years now.  He also gets significant abdominal bloating after he any meals and cramps mainly in the lower abdomen that gets better with the defecation.  She will have a bowel movement every 4-5 loose stools every day.  She also had a significant nausea with the excessive burping.  As per patient nausea got better after she had a gallbladder surgery last year but she still has excessive burping and vomiting.      Routine lab studies done did not reveal any significant normality.  She had an EGD done by Dr. 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  She appears to have had some erosive esophagitis.  Biopsies were negative for Black's.  She had a colonoscopy done in 2017 by Dr. Loza and was reported as normal.  Transverse colon biopsy did not reveal any signs of microscopic colitis.  Patient does not have any family history of IBD.   Her ultrasound abdomen done in 2020 revealed GB polyp and for which she had a gallbladder removal     Her current main symptom is excessive burping and occasional vomiting but when she eats.  This could be constellation of her IBS symptoms however gastroparesis cannot be ruled out given her history bile noted in the stomach with the previous EGD.  Her duodenal biopsies done in 2020 did not reveal any signs of celiac disease she is an ex-smoker now stopped smoking.   She does drink excessive carbonated beverages now.  Advised to cut down and avoid any carbonated beverages for now  Advised to avoid cheese and daily foods.  Also discussed on a low gas-forming diet     We will get a stool for pancreatic elastase and calprotectin, stool for fecal fat for malabsorption  Celiac serology  We will also  get a CBC CMP given her elevated liver enzymes in the past     Gastric emptying study to rule out gastroparesis  I have started her on Zofran 4 mg twice daily as needed for now.   We will also start her on cholestyramine 4 g p.o. twice daily  We will reassess in 12 weeks time.  We may consider Reglan if her upper GI symptoms does not improve with the Zofran  We had a discussion on losing weight today.  We discussed on a regular exercise.       6. Long term (current) use of non-steroidal anti-inflammatories (nsaid)  7. Gastroesophageal reflux disease with esophagitis without hemorrhage  8/4/2021  Given her persistent symptoms we will continue with the low-dose PPI daily.    4/22/2021  she is currently on a Protonix 20 g p.o. twice daily.  As per patient she cannot miss even a single dose she will get significant reflux symptoms.   EGD done in 2020 revealed what appears to be variable Z-line with esophagitis.  No Black's identified  We will continue PPI low-dose twice daily for now     8. Non-alcoholic fatty liver diseas   9. Class 3 severe obesity due to excess calories without serious comorbidity with body mass index (BMI) of 40.0 to 44.9 in adult (CMS/HCC)  Gained 13 pounds  May need gastric sleeve  In the near future  Will repeat labs in 6 mnths  8/4/2021  Unfortunately patient is gaining weight she gained about 13 pounds since the last visit.. Her labs reviewed no significant change in liver enzymes.  If patient continues to gain she is very high risk of progression of the fatty liver disease.  Again discussed the importance of losing weight.   Patient may need a bariatric surgery in the near future   We will consider basic liver work-up if her repeat CMP still elevated in 6 months time    Prior history  10. Encounter for screening for malignant neoplasm of colon  Last colonoscopy was in 2017 by Dr. Loza and reported was normal  Based on the above report she needs repeat colonoscopy in 2027 or  earlier             Follow Up:   No follow-ups on file.    Merary Holliday MD  [facility] [place]  8/4/2021  16:25 EDT     Please note that portions of this note may have been completed with a voice recognition program.

## 2021-08-27 RX ORDER — POTASSIUM CHLORIDE 20 MEQ/1
TABLET, EXTENDED RELEASE ORAL
Qty: 90 TABLET | Refills: 0 | Status: SHIPPED | OUTPATIENT
Start: 2021-08-27 | End: 2021-11-08

## 2021-10-29 DIAGNOSIS — E78.49 OTHER HYPERLIPIDEMIA: ICD-10-CM

## 2021-10-29 RX ORDER — ATORVASTATIN CALCIUM 10 MG/1
TABLET, FILM COATED ORAL
Qty: 90 TABLET | Refills: 0 | Status: SHIPPED | OUTPATIENT
Start: 2021-10-29 | End: 2022-02-15

## 2021-11-07 DIAGNOSIS — M79.7 FIBROMYALGIA: ICD-10-CM

## 2021-11-08 RX ORDER — POTASSIUM CHLORIDE 20 MEQ/1
TABLET, EXTENDED RELEASE ORAL
Qty: 90 TABLET | Refills: 0 | Status: SHIPPED | OUTPATIENT
Start: 2021-11-08 | End: 2022-02-15

## 2021-11-08 RX ORDER — CHLORTHALIDONE 25 MG/1
TABLET ORAL
Qty: 90 TABLET | Refills: 0 | Status: SHIPPED | OUTPATIENT
Start: 2021-11-08 | End: 2022-02-15

## 2021-11-08 RX ORDER — MELOXICAM 15 MG/1
TABLET ORAL
Qty: 90 TABLET | Refills: 0 | Status: SHIPPED | OUTPATIENT
Start: 2021-11-08 | End: 2022-02-15

## 2022-02-14 DIAGNOSIS — M79.7 FIBROMYALGIA: ICD-10-CM

## 2022-02-14 DIAGNOSIS — E78.49 OTHER HYPERLIPIDEMIA: ICD-10-CM

## 2022-02-15 RX ORDER — MELOXICAM 15 MG/1
TABLET ORAL
Qty: 90 TABLET | Refills: 0 | Status: SHIPPED | OUTPATIENT
Start: 2022-02-15 | End: 2022-05-16

## 2022-02-15 RX ORDER — POTASSIUM CHLORIDE 20 MEQ/1
TABLET, EXTENDED RELEASE ORAL
Qty: 90 TABLET | Refills: 0 | Status: SHIPPED | OUTPATIENT
Start: 2022-02-15 | End: 2022-05-16

## 2022-02-15 RX ORDER — ATORVASTATIN CALCIUM 10 MG/1
TABLET, FILM COATED ORAL
Qty: 90 TABLET | Refills: 0 | Status: SHIPPED | OUTPATIENT
Start: 2022-02-15 | End: 2022-05-16

## 2022-02-15 RX ORDER — CHLORTHALIDONE 25 MG/1
TABLET ORAL
Qty: 90 TABLET | Refills: 0 | Status: SHIPPED | OUTPATIENT
Start: 2022-02-15 | End: 2022-05-16

## 2022-05-15 DIAGNOSIS — E78.49 OTHER HYPERLIPIDEMIA: ICD-10-CM

## 2022-05-15 DIAGNOSIS — M79.7 FIBROMYALGIA: ICD-10-CM

## 2022-05-16 DIAGNOSIS — M79.7 FIBROMYALGIA: ICD-10-CM

## 2022-05-16 RX ORDER — TIZANIDINE HYDROCHLORIDE 6 MG/1
6 CAPSULE, GELATIN COATED ORAL 3 TIMES DAILY
Qty: 90 CAPSULE | Refills: 0 | Status: SHIPPED | OUTPATIENT
Start: 2022-05-16 | End: 2022-08-23

## 2022-05-16 RX ORDER — CHLORTHALIDONE 25 MG/1
TABLET ORAL
Qty: 90 TABLET | Refills: 0 | Status: SHIPPED | OUTPATIENT
Start: 2022-05-16 | End: 2022-09-26 | Stop reason: SDUPTHER

## 2022-05-16 RX ORDER — MELOXICAM 15 MG/1
15 TABLET ORAL DAILY
Qty: 30 TABLET | Refills: 0 | Status: SHIPPED | OUTPATIENT
Start: 2022-05-16 | End: 2022-05-26 | Stop reason: SDUPTHER

## 2022-05-16 RX ORDER — MELOXICAM 15 MG/1
TABLET ORAL
Qty: 90 TABLET | Refills: 0 | Status: SHIPPED | OUTPATIENT
Start: 2022-05-16 | End: 2022-09-26 | Stop reason: SDUPTHER

## 2022-05-16 RX ORDER — POTASSIUM CHLORIDE 20 MEQ/1
20 TABLET, EXTENDED RELEASE ORAL DAILY
Qty: 30 TABLET | Refills: 0 | Status: SHIPPED | OUTPATIENT
Start: 2022-05-16 | End: 2022-05-26 | Stop reason: SDUPTHER

## 2022-05-16 RX ORDER — CHLORTHALIDONE 25 MG/1
25 TABLET ORAL DAILY
Qty: 30 TABLET | Refills: 0 | Status: SHIPPED | OUTPATIENT
Start: 2022-05-16 | End: 2022-05-23 | Stop reason: SDUPTHER

## 2022-05-16 RX ORDER — ATORVASTATIN CALCIUM 10 MG/1
TABLET, FILM COATED ORAL
Qty: 90 TABLET | Refills: 0 | Status: SHIPPED | OUTPATIENT
Start: 2022-05-16 | End: 2022-08-12

## 2022-05-16 RX ORDER — POTASSIUM CHLORIDE 20 MEQ/1
TABLET, EXTENDED RELEASE ORAL
Qty: 90 TABLET | Refills: 0 | Status: SHIPPED | OUTPATIENT
Start: 2022-05-16 | End: 2022-10-24

## 2022-05-23 ENCOUNTER — OFFICE VISIT (OUTPATIENT)
Dept: FAMILY MEDICINE CLINIC | Facility: CLINIC | Age: 47
End: 2022-05-23

## 2022-05-23 VITALS
HEART RATE: 100 BPM | WEIGHT: 259.6 LBS | OXYGEN SATURATION: 96 % | SYSTOLIC BLOOD PRESSURE: 134 MMHG | HEIGHT: 65 IN | DIASTOLIC BLOOD PRESSURE: 74 MMHG | BODY MASS INDEX: 43.25 KG/M2

## 2022-05-23 DIAGNOSIS — E55.9 VITAMIN D DEFICIENCY: ICD-10-CM

## 2022-05-23 DIAGNOSIS — Z51.81 MEDICATION MONITORING ENCOUNTER: ICD-10-CM

## 2022-05-23 DIAGNOSIS — E78.2 MIXED HYPERLIPIDEMIA: ICD-10-CM

## 2022-05-23 DIAGNOSIS — Z28.21 PNEUMOCOCCAL VACCINATION DECLINED BY PATIENT: ICD-10-CM

## 2022-05-23 DIAGNOSIS — R73.03 PREDIABETES: ICD-10-CM

## 2022-05-23 DIAGNOSIS — K76.0 FATTY LIVER: ICD-10-CM

## 2022-05-23 DIAGNOSIS — Z11.59 NEED FOR HEPATITIS C SCREENING TEST: ICD-10-CM

## 2022-05-23 DIAGNOSIS — I10 ESSENTIAL HYPERTENSION: ICD-10-CM

## 2022-05-23 DIAGNOSIS — Z53.20 CERVICAL CANCER SCREENING DECLINED: ICD-10-CM

## 2022-05-23 DIAGNOSIS — E66.01 MORBID OBESITY: ICD-10-CM

## 2022-05-23 DIAGNOSIS — E66.01 CLASS 3 SEVERE OBESITY DUE TO EXCESS CALORIES WITH SERIOUS COMORBIDITY AND BODY MASS INDEX (BMI) OF 40.0 TO 44.9 IN ADULT: ICD-10-CM

## 2022-05-23 DIAGNOSIS — M79.7 FIBROMYALGIA: ICD-10-CM

## 2022-05-23 DIAGNOSIS — R25.2 MUSCLE CRAMPING: ICD-10-CM

## 2022-05-23 DIAGNOSIS — Z53.20 MAMMOGRAM DECLINED: ICD-10-CM

## 2022-05-23 DIAGNOSIS — Z00.00 WELL ADULT EXAM: Primary | ICD-10-CM

## 2022-05-23 PROCEDURE — 99396 PREV VISIT EST AGE 40-64: CPT | Performed by: FAMILY MEDICINE

## 2022-05-23 NOTE — PROGRESS NOTES
"Subjective   Madalyn Villegas is a 47 y.o. female.     History of Present Illness   Mrs. Villegas presents today for annual check. Last seen over a year ago. Also has several concerns she would like to discuss.    Reproductive History  Sexual Activity: in past   Contraception: abstinence/barrier  Menses: regular, getting heavier  H/O abnormal pap: no  Cervical procedures: none     Social Hx  Marital status: single  Tobacco use: quit 2019  EtOH use: denies  Illicit drug use: denies     Lifestyle  Diet: varied, excess calories  Exercise: none regularly at this time  Using seatbelt: ues  Sunscreen: yes  Mental Health: stable  Feels safe in home: yes     Screenings  Last pap: 2017  Last mammogram: never  Colonoscopy: never  DEXA: n/a  FLP: 2020  BG/A1c: 2020  Vitamin D: unsure  Last dental check up: irregular  Last vision exam: approx 1 year  Immunizations UTD: no  Needs Pneumococcal, but declines     She c/o Worse LBP, known LDDD. Some right hip laxity, weakness with Right hip pain, mild. Trouble laying on that side. \"Walking constantly\" in her new job. Trouble moving afterward. Wearing orthotic shoes. Also with recurrent Muscle spasm upper back, around ribs.     She declines all vaccinations, declined cervical cancer screening, declines mammogram.    Requesting Intermittent FMLA - migraines, BPPV, IBSx, endometriosis.  - start date is date of signature  Days missing per month, 4 days total per month  Days at a time  - 1-2 days    Staying fatigued, snores.     Pt's previous HPI reviewed and updated as indicated.     The following portions of the patient's history were reviewed and updated as appropriate: allergies, current medications, past family history, past medical history, past social history, past surgical history and problem list.    Review of Systems   Constitutional: Positive for fatigue. Negative for diaphoresis and fever.   HENT: Positive for congestion and postnasal drip. Negative for mouth sores, rhinorrhea, " sinus pain and sore throat.    Eyes: Negative for visual disturbance.   Respiratory: Negative for cough, shortness of breath and wheezing.    Cardiovascular: Negative for chest pain, palpitations and leg swelling.   Gastrointestinal: Positive for nausea. Negative for blood in stool.        Heartburn   Endocrine: Positive for heat intolerance. Negative for polydipsia and polyuria.   Genitourinary: Negative for dysuria and hematuria.   Musculoskeletal: Positive for arthralgias, back pain and myalgias.   Skin: Positive for rash. Negative for wound.   Neurological: Positive for headaches. Negative for dizziness, tremors and weakness.   Hematological: Negative for adenopathy. Bruises/bleeds easily.   Psychiatric/Behavioral: Negative for confusion, dysphoric mood and sleep disturbance.   Pt's previous ROS reviewed and updated as indicated.       Objective    Vitals:    05/23/22 1101   BP: 134/74   Pulse: 100   SpO2: 96%     Body mass index is 43.2 kg/m².      05/23/22  1101   Weight: 118 kg (259 lb 9.6 oz)       Physical Exam  Vitals and nursing note reviewed.   Constitutional:       Appearance: She is well-developed and well-groomed. She is morbidly obese. She is not ill-appearing.   HENT:      Head: Normocephalic and atraumatic.      Right Ear: Tympanic membrane, ear canal and external ear normal.      Left Ear: Tympanic membrane, ear canal and external ear normal.      Nose: Nose normal.      Mouth/Throat:      Mouth: No oral lesions.      Pharynx: Oropharynx is clear.   Eyes:      General: No scleral icterus.     Extraocular Movements: Extraocular movements intact.      Conjunctiva/sclera: Conjunctivae normal.   Neck:      Thyroid: No thyroid mass.      Vascular: Normal carotid pulses.   Cardiovascular:      Rate and Rhythm: Normal rate and regular rhythm.      Pulses: Normal pulses.      Heart sounds: Normal heart sounds.   Pulmonary:      Effort: Pulmonary effort is normal.      Breath sounds: Normal breath sounds  and air entry.   Abdominal:      General: Bowel sounds are normal.      Palpations: Abdomen is soft. There is no mass (exam limited by body habitus).      Tenderness: There is abdominal tenderness (mild) in the epigastric area. There is no guarding or rebound.   Musculoskeletal:      Right lower leg: No edema.      Left lower leg: No edema.   Lymphadenopathy:      Cervical: No cervical adenopathy.   Skin:     General: Skin is warm and dry.      Coloration: Skin is not jaundiced or pale.   Neurological:      Mental Status: She is alert and oriented to person, place, and time.      Motor: No tremor.      Gait: Gait is intact.   Psychiatric:         Mood and Affect: Mood and affect normal.         Behavior: Behavior normal. Behavior is cooperative.         Cognition and Memory: Cognition normal.     Pt's previous physical exam reviewed and updated as indicated.        Assessment & Plan   Diagnoses and all orders for this visit:    1. Well adult exam (Primary)    2. Vitamin D deficiency    3. Prediabetes  -     Comprehensive Metabolic Panel  -     TSH Rfx On Abnormal To Free T4  -     Hemoglobin A1c    4. Mixed hyperlipidemia  -     Comprehensive Metabolic Panel  -     Lipid Panel  -     TSH Rfx On Abnormal To Free T4    5. Fibromyalgia    6. Fatty liver  -     Comprehensive Metabolic Panel    7. Essential hypertension  -     CBC & Differential  -     Comprehensive Metabolic Panel  -     TSH Rfx On Abnormal To Free T4  -     Magnesium    8. Medication monitoring encounter  -     CBC & Differential  -     Comprehensive Metabolic Panel    9. Muscle cramping  -     CBC & Differential  -     Comprehensive Metabolic Panel  -     TSH Rfx On Abnormal To Free T4  -     Magnesium  -     CK    10. Cervical cancer screening declined    11. Mammogram declined    12. Pneumococcal vaccination declined by patient    13. Need for hepatitis C screening test  -     Hepatitis C Antibody    14. Class 3 severe obesity due to excess calories  with serious comorbidity and body mass index (BMI) of 40.0 to 44.9 in adult (HCC)    15. Morbid obesity (HCC)    Other orders  -     Manual Differential       Age appropriate preventive care reviewed including cancer screenings, safety measures, mental health concerns, supplements, prevention of CV disease and DM, etc. Handout provided.    Chronic obesity, now morbid. Nutrition and activity goals reviewed including: mainly water to drink, limit white flour/processed sugar, higher lean protein, high fiber carbs, regular meals, working toward 150 mins cardio per week, resistance training 2x/week. Goal of 1500 kcal per day.    Continue chlorthalidone for HTN. Recheck renal function, K level, etc. Continue lipitor for mgnt of HLP. Pt advised to eat a heart healthy diet and get regular aerobic exercise.    Assess status of vit/min deficiencies and replace as indicated.     She will consider sleep study.    F/u in 3 months, sooner as needed/instructed.  I will contact patient regarding test results and provide instructions regarding any necessary changes in plan of care.  Patient was encouraged to keep me informed of any acute changes, lack of improvement, or any new concerning symptoms.  Pt is aware of reasons to seek emergent care.  Patient voiced understanding of all instructions and denied further questions.    Please note that portions of this note may have been completed with a voice recognition program. Efforts were made to edit the dictations, but occasionally words are mistranscribed.

## 2022-05-24 LAB
ALBUMIN SERPL-MCNC: 4.6 G/DL (ref 3.5–5.2)
ALBUMIN/GLOB SERPL: 1.5 G/DL
ALP SERPL-CCNC: 91 U/L (ref 39–117)
ALT SERPL-CCNC: 56 U/L (ref 1–33)
AST SERPL-CCNC: 40 U/L (ref 1–32)
BASOPHILS # BLD AUTO: ABNORMAL 10*3/UL
BASOPHILS # BLD MANUAL: 0.11 10*3/MM3 (ref 0–0.2)
BASOPHILS NFR BLD MANUAL: 1 % (ref 0–1.5)
BILIRUB SERPL-MCNC: 0.4 MG/DL (ref 0–1.2)
BUN SERPL-MCNC: 20 MG/DL (ref 6–20)
BUN/CREAT SERPL: 24.4 (ref 7–25)
CALCIUM SERPL-MCNC: 11.1 MG/DL (ref 8.6–10.5)
CHLORIDE SERPL-SCNC: 96 MMOL/L (ref 98–107)
CHOLEST SERPL-MCNC: 241 MG/DL (ref 0–200)
CK SERPL-CCNC: 61 U/L (ref 20–180)
CO2 SERPL-SCNC: 22.1 MMOL/L (ref 22–29)
CREAT SERPL-MCNC: 0.82 MG/DL (ref 0.57–1)
DIFFERENTIAL COMMENT: ABNORMAL
EGFRCR SERPLBLD CKD-EPI 2021: 87.8 ML/MIN/1.73
EOSINOPHIL # BLD AUTO: ABNORMAL 10*3/UL
EOSINOPHIL # BLD MANUAL: 0.35 10*3/MM3 (ref 0–0.4)
EOSINOPHIL NFR BLD AUTO: ABNORMAL %
EOSINOPHIL NFR BLD MANUAL: 3.1 % (ref 0.3–6.2)
ERYTHROCYTE [DISTWIDTH] IN BLOOD BY AUTOMATED COUNT: 13.8 % (ref 12.3–15.4)
GLOBULIN SER CALC-MCNC: 3 GM/DL
GLUCOSE SERPL-MCNC: 131 MG/DL (ref 65–99)
HBA1C MFR BLD: 6.5 % (ref 4.8–5.6)
HCT VFR BLD AUTO: 43.3 % (ref 34–46.6)
HDLC SERPL-MCNC: 58 MG/DL (ref 40–60)
HGB BLD-MCNC: 13.5 G/DL (ref 12–15.9)
LDLC SERPL CALC-MCNC: 145 MG/DL (ref 0–100)
LYMPHOCYTES # BLD AUTO: ABNORMAL 10*3/UL
LYMPHOCYTES # BLD MANUAL: 2.94 10*3/MM3 (ref 0.7–3.1)
LYMPHOCYTES NFR BLD AUTO: ABNORMAL %
LYMPHOCYTES NFR BLD MANUAL: 25.8 % (ref 19.6–45.3)
MAGNESIUM SERPL-MCNC: 1.7 MG/DL (ref 1.6–2.6)
MCH RBC QN AUTO: 24.2 PG (ref 26.6–33)
MCHC RBC AUTO-ENTMCNC: 31.2 G/DL (ref 31.5–35.7)
MCV RBC AUTO: 77.6 FL (ref 79–97)
MONOCYTES # BLD MANUAL: 0.47 10*3/MM3 (ref 0.1–0.9)
MONOCYTES NFR BLD AUTO: ABNORMAL %
MONOCYTES NFR BLD MANUAL: 4.1 % (ref 5–12)
NEUTROPHILS # BLD MANUAL: 7.52 10*3/MM3 (ref 1.7–7)
NEUTROPHILS NFR BLD AUTO: ABNORMAL %
NEUTROPHILS NFR BLD MANUAL: 66 % (ref 42.7–76)
PLATELET # BLD AUTO: 249 10*3/MM3 (ref 140–450)
PLATELET BLD QL SMEAR: ABNORMAL
POTASSIUM SERPL-SCNC: 4 MMOL/L (ref 3.5–5.2)
PROT SERPL-MCNC: 7.6 G/DL (ref 6–8.5)
RBC # BLD AUTO: 5.58 10*6/MM3 (ref 3.77–5.28)
RBC MORPH BLD: ABNORMAL
SODIUM SERPL-SCNC: 136 MMOL/L (ref 136–145)
TRIGL SERPL-MCNC: 209 MG/DL (ref 0–150)
TSH SERPL DL<=0.005 MIU/L-ACNC: 3.8 UIU/ML (ref 0.27–4.2)
VLDLC SERPL CALC-MCNC: 38 MG/DL (ref 5–40)
WBC # BLD AUTO: 11.39 10*3/MM3 (ref 3.4–10.8)

## 2022-05-27 DIAGNOSIS — K76.0 FATTY LIVER: Primary | ICD-10-CM

## 2022-05-27 DIAGNOSIS — R79.89 ABNORMAL LIVER FUNCTION TESTS: ICD-10-CM

## 2022-05-27 DIAGNOSIS — E66.01 CLASS 3 SEVERE OBESITY DUE TO EXCESS CALORIES WITH SERIOUS COMORBIDITY AND BODY MASS INDEX (BMI) OF 40.0 TO 44.9 IN ADULT: ICD-10-CM

## 2022-05-27 DIAGNOSIS — R73.03 PREDIABETES: ICD-10-CM

## 2022-05-27 DIAGNOSIS — E83.52 HYPERCALCEMIA: ICD-10-CM

## 2022-05-27 RX ORDER — METFORMIN HYDROCHLORIDE 500 MG/1
500 TABLET, EXTENDED RELEASE ORAL
Qty: 30 TABLET | Refills: 2 | Status: SHIPPED | OUTPATIENT
Start: 2022-05-27 | End: 2022-08-11 | Stop reason: SDUPTHER

## 2022-06-20 ENCOUNTER — HOSPITAL ENCOUNTER (OUTPATIENT)
Dept: ULTRASOUND IMAGING | Facility: HOSPITAL | Age: 47
Discharge: HOME | End: 2022-06-20
Admitting: FAMILY MEDICINE

## 2022-06-20 DIAGNOSIS — K76.0 FATTY LIVER: ICD-10-CM

## 2022-06-20 DIAGNOSIS — R79.89 ABNORMAL LIVER FUNCTION TESTS: ICD-10-CM

## 2022-06-20 DIAGNOSIS — E66.01 CLASS 3 SEVERE OBESITY DUE TO EXCESS CALORIES WITH SERIOUS COMORBIDITY AND BODY MASS INDEX (BMI) OF 40.0 TO 44.9 IN ADULT: ICD-10-CM

## 2022-06-20 PROCEDURE — 76705 ECHO EXAM OF ABDOMEN: CPT

## 2022-07-01 LAB — HCV AB S/CO SERPL IA: <0.1 S/CO RATIO (ref 0–0.9)

## 2022-07-05 ENCOUNTER — TELEPHONE (OUTPATIENT)
Dept: FAMILY MEDICINE CLINIC | Facility: CLINIC | Age: 47
End: 2022-07-05

## 2022-07-05 DIAGNOSIS — E83.52 HYPERCALCEMIA: Primary | ICD-10-CM

## 2022-07-19 ENCOUNTER — TELEPHONE (OUTPATIENT)
Dept: FAMILY MEDICINE CLINIC | Facility: CLINIC | Age: 47
End: 2022-07-19

## 2022-07-19 NOTE — TELEPHONE ENCOUNTER
Pt dropped off an additional page of her FMLA form that was missing from original.    I cannot complete based on previous dates as FMLA forms are not scanned to chart. Do we have those waiting to be scanned for my review? If not, please contact pt and confirm start date/end dates please.

## 2022-07-25 ENCOUNTER — TELEPHONE (OUTPATIENT)
Dept: FAMILY MEDICINE CLINIC | Facility: CLINIC | Age: 47
End: 2022-07-25

## 2022-07-25 NOTE — TELEPHONE ENCOUNTER
Regarding: REGARDING DATES FOR FMLA  ----- Message from Onelia David MA sent at 7/22/2022  9:45 AM EDT -----       ----- Message from Madalyn Villegas to Onelia David MA sent at 7/22/2022  9:26 AM -----   The start date was the 24th of June. End day the 24th of December.       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    Could you please provide these date's.    Your paperwork has been scanned, but has not been placed in your chart.     I apologize for the delay.    Thank you,  YUDY Rousseau  [facility] ADMIN MA

## 2022-08-11 DIAGNOSIS — R73.03 PREDIABETES: ICD-10-CM

## 2022-08-11 RX ORDER — METFORMIN HYDROCHLORIDE 500 MG/1
500 TABLET, EXTENDED RELEASE ORAL
Qty: 30 TABLET | Refills: 2 | Status: SHIPPED | OUTPATIENT
Start: 2022-08-11 | End: 2022-09-26 | Stop reason: SDUPTHER

## 2022-08-12 DIAGNOSIS — E78.49 OTHER HYPERLIPIDEMIA: ICD-10-CM

## 2022-08-12 RX ORDER — ATORVASTATIN CALCIUM 10 MG/1
TABLET, FILM COATED ORAL
Qty: 90 TABLET | Refills: 0 | Status: SHIPPED | OUTPATIENT
Start: 2022-08-12 | End: 2022-09-26 | Stop reason: SDUPTHER

## 2022-08-23 RX ORDER — TIZANIDINE HYDROCHLORIDE 6 MG/1
CAPSULE, GELATIN COATED ORAL
Qty: 90 CAPSULE | Refills: 0 | Status: SHIPPED | OUTPATIENT
Start: 2022-08-23 | End: 2022-09-26 | Stop reason: SDUPTHER

## 2022-08-30 LAB
CALCIUM 24H UR-MCNC: 18.2 MG/DL
CALCIUM 24H UR-MRATE: 273 MG/24 HR (ref 0–320)

## 2022-09-26 ENCOUNTER — OFFICE VISIT (OUTPATIENT)
Dept: FAMILY MEDICINE CLINIC | Facility: CLINIC | Age: 47
End: 2022-09-26

## 2022-09-26 VITALS
TEMPERATURE: 98.4 F | DIASTOLIC BLOOD PRESSURE: 80 MMHG | SYSTOLIC BLOOD PRESSURE: 132 MMHG | OXYGEN SATURATION: 97 % | HEART RATE: 94 BPM | BODY MASS INDEX: 43.32 KG/M2 | WEIGHT: 260 LBS | HEIGHT: 65 IN

## 2022-09-26 DIAGNOSIS — I10 ESSENTIAL HYPERTENSION: Primary | ICD-10-CM

## 2022-09-26 DIAGNOSIS — H04.123 DRY EYE SYNDROME OF BOTH EYES: ICD-10-CM

## 2022-09-26 DIAGNOSIS — R68.2 DRY MOUTH: ICD-10-CM

## 2022-09-26 DIAGNOSIS — E83.52 HYPERCALCEMIA: ICD-10-CM

## 2022-09-26 DIAGNOSIS — R71.8 MICROCYTOSIS: ICD-10-CM

## 2022-09-26 DIAGNOSIS — M79.7 FIBROMYALGIA: ICD-10-CM

## 2022-09-26 DIAGNOSIS — E78.49 OTHER HYPERLIPIDEMIA: ICD-10-CM

## 2022-09-26 DIAGNOSIS — J30.89 NON-SEASONAL ALLERGIC RHINITIS, UNSPECIFIED TRIGGER: ICD-10-CM

## 2022-09-26 DIAGNOSIS — R73.03 PREDIABETES: ICD-10-CM

## 2022-09-26 DIAGNOSIS — R74.8 ABNORMAL LIVER ENZYMES: ICD-10-CM

## 2022-09-26 DIAGNOSIS — R13.19 ESOPHAGEAL DYSPHAGIA: ICD-10-CM

## 2022-09-26 PROCEDURE — 96372 THER/PROPH/DIAG INJ SC/IM: CPT | Performed by: FAMILY MEDICINE

## 2022-09-26 PROCEDURE — 99214 OFFICE O/P EST MOD 30 MIN: CPT | Performed by: FAMILY MEDICINE

## 2022-09-26 RX ORDER — METFORMIN HYDROCHLORIDE 500 MG/1
500 TABLET, EXTENDED RELEASE ORAL
Qty: 90 TABLET | Refills: 1 | Status: SHIPPED | OUTPATIENT
Start: 2022-09-26 | End: 2022-12-12 | Stop reason: SINTOL

## 2022-09-26 RX ORDER — LEVOCETIRIZINE DIHYDROCHLORIDE 5 MG/1
5 TABLET, FILM COATED ORAL EVERY EVENING
Qty: 30 TABLET | Refills: 2 | Status: SHIPPED | OUTPATIENT
Start: 2022-09-26

## 2022-09-26 RX ORDER — FLUTICASONE PROPIONATE 50 MCG
2 SPRAY, SUSPENSION (ML) NASAL DAILY
Qty: 16 G | Refills: 2 | Status: SHIPPED | OUTPATIENT
Start: 2022-09-26

## 2022-09-26 RX ORDER — CHLORTHALIDONE 25 MG/1
25 TABLET ORAL DAILY
Qty: 90 TABLET | Refills: 1 | Status: SHIPPED | OUTPATIENT
Start: 2022-09-26 | End: 2023-03-17

## 2022-09-26 RX ORDER — ATORVASTATIN CALCIUM 10 MG/1
10 TABLET, FILM COATED ORAL NIGHTLY
Qty: 90 TABLET | Refills: 1 | Status: SHIPPED | OUTPATIENT
Start: 2022-09-26

## 2022-09-26 RX ORDER — TIZANIDINE HYDROCHLORIDE 6 MG/1
6 CAPSULE, GELATIN COATED ORAL 3 TIMES DAILY
Qty: 90 CAPSULE | Refills: 5 | Status: SHIPPED | OUTPATIENT
Start: 2022-09-26

## 2022-09-26 RX ORDER — DEXAMETHASONE SODIUM PHOSPHATE 4 MG/ML
4 INJECTION, SOLUTION INTRA-ARTICULAR; INTRALESIONAL; INTRAMUSCULAR; INTRAVENOUS; SOFT TISSUE ONCE
Status: COMPLETED | OUTPATIENT
Start: 2022-09-26 | End: 2022-09-26

## 2022-09-26 RX ORDER — MELOXICAM 15 MG/1
15 TABLET ORAL DAILY
Qty: 90 TABLET | Refills: 1 | Status: SHIPPED | OUTPATIENT
Start: 2022-09-26

## 2022-09-26 RX ADMIN — DEXAMETHASONE SODIUM PHOSPHATE 4 MG: 4 INJECTION, SOLUTION INTRA-ARTICULAR; INTRALESIONAL; INTRAMUSCULAR; INTRAVENOUS; SOFT TISSUE at 10:42

## 2022-09-26 NOTE — PROGRESS NOTES
Subjective   Madalyn Villegas is a 47 y.o. female.     History of Present Illness  Answers for HPI/ROS submitted by the patient on 9/26/2022  Please describe your symptoms.: Sinus congestion, headache caused by sinus pressure, sore throat caused by drainage  Have you had these symptoms before?: Yes  How long have you been having these symptoms?: Greater than 2 weeks  Please list any medications you are currently taking for this condition.: Mucinex  Please describe any probable cause for these symptoms. : Sinus infection  What is the primary reason for your visit?: Other    History provided by pt reviewed as above. Currently on nasal decongestant. No facial pain, No persistent rhinorrhea.    Taking her vit D at 2000 units  Started magnesium  Started metformin, doing one daily with dinner    Liver US showed fatty liver    Having intermtitent flares of diarrhea, which she relates to ?IBSx. Using intermittent leave at work once monthly for it.    Working on dietary changes. Struggling with calorie limitation and regular exercise. Chronic obesity, currently morbid. Insurance does not pay for bariatric surgery.    On NSAID, muscle relaxant for FMSx.    On thiazide for HTN.  On statin for HLP    Previously mild hypercalcemia, due for recheck.    The following portions of the patient's history were reviewed and updated as appropriate: allergies, current medications, past family history, past medical history, past social history, past surgical history and problem list.    Review of Systems   Constitutional: Positive for fatigue and unexpected weight change. Negative for diaphoresis and fever.   HENT: Positive for congestion, postnasal drip, sinus pressure, sneezing and trouble swallowing. Negative for mouth sores, rhinorrhea, sinus pain and sore throat.    Eyes: Negative for visual disturbance.   Respiratory: Negative for cough, shortness of breath and wheezing.    Cardiovascular: Negative for chest pain, palpitations and  leg swelling.   Gastrointestinal: Positive for constipation, diarrhea and nausea. Negative for blood in stool and vomiting.        Heartburn   Endocrine: Positive for heat intolerance. Negative for polydipsia and polyuria.   Genitourinary: Negative for dysuria and hematuria.   Musculoskeletal: Positive for arthralgias, back pain and myalgias.   Neurological: Positive for headaches. Negative for dizziness, tremors and weakness.   Hematological: Negative for adenopathy. Bruises/bleeds easily.   Psychiatric/Behavioral: Negative for confusion, dysphoric mood and sleep disturbance.   Pt's previous ROS reviewed and updated as indicated.       Objective    Vitals:    09/26/22 1003   BP: 132/80   Pulse: 94   Temp: 98.4 °F (36.9 °C)   SpO2: 97%     Body mass index is 43.27 kg/m².      09/26/22  1003   Weight: 118 kg (260 lb)       Physical Exam  Vitals and nursing note reviewed.   Constitutional:       Appearance: She is well-developed and well-groomed. She is morbidly obese. She is not ill-appearing.   HENT:      Right Ear: Ear canal and external ear normal. Tympanic membrane is retracted.      Left Ear: Ear canal and external ear normal. Tympanic membrane is retracted.      Nose: Mucosal edema, congestion and rhinorrhea present. Rhinorrhea is clear.      Mouth/Throat:      Mouth: No oral lesions.      Pharynx: Oropharynx is clear.   Eyes:      General: No scleral icterus.     Conjunctiva/sclera:      Right eye: Right conjunctiva is injected (mildy).      Left eye: Left conjunctiva is injected (mildly).   Neck:      Thyroid: No thyroid mass.      Vascular: Normal carotid pulses. No carotid bruit.   Cardiovascular:      Rate and Rhythm: Normal rate and regular rhythm.      Pulses: Normal pulses.      Heart sounds: Normal heart sounds.   Pulmonary:      Effort: Pulmonary effort is normal.      Breath sounds: Normal breath sounds and air entry.   Abdominal:      General: Bowel sounds are normal.      Palpations: Abdomen is  soft. There is no mass (exam limited by body habitus).      Tenderness: There is generalized abdominal tenderness (mild). There is no guarding or rebound.   Musculoskeletal:      Right lower leg: No edema.      Left lower leg: No edema.   Lymphadenopathy:      Cervical: No cervical adenopathy.   Skin:     General: Skin is warm and dry.      Coloration: Skin is not jaundiced or pale.      Findings: No bruising.   Neurological:      Mental Status: She is alert and oriented to person, place, and time.      Motor: No tremor.      Gait: Gait is intact.   Psychiatric:         Mood and Affect: Affect normal. Mood is depressed (mildly).         Behavior: Behavior normal. Behavior is cooperative.         Thought Content: Thought content normal. Thought content is not paranoid or delusional. Thought content does not include suicidal ideation.         Cognition and Memory: Cognition normal.     Pt's previous physical exam reviewed and updated as indicated.      Assessment & Plan   Diagnoses and all orders for this visit:    1. Essential hypertension (Primary)  -     chlorthalidone (HYGROTON) 25 MG tablet; Take 1 tablet by mouth Daily.  Dispense: 90 tablet; Refill: 1    2. Other hyperlipidemia  -     atorvastatin (LIPITOR) 10 MG tablet; Take 1 tablet by mouth Every Night.  Dispense: 90 tablet; Refill: 1    3. Fibromyalgia  -     meloxicam (MOBIC) 15 MG tablet; Take 1 tablet by mouth Daily.  Dispense: 90 tablet; Refill: 1  -     TiZANidine (ZANAFLEX) 6 MG capsule; Take 1 capsule by mouth 3 (Three) Times a Day.  Dispense: 90 capsule; Refill: 5    4. Prediabetes  -     metFORMIN ER (Glucophage XR) 500 MG 24 hr tablet; Take 1 tablet by mouth Daily With Breakfast.  Dispense: 90 tablet; Refill: 1  -     Hemoglobin A1c    5. Non-seasonal allergic rhinitis, unspecified trigger  -     dexamethasone (DECADRON) injection 4 mg    6. Hypercalcemia  -     Calcium, Ionized    7. Abnormal liver enzymes  -     AST  -     ALT    8.  Microcytosis  -     Iron    9. Dry eye syndrome of both eyes  -     CHANDRA With / DsDNA, RNP, Sjogrens A / B, Cochran    10. Dry mouth  -     CHANDRA With / DsDNA, RNP, Sjogrens A / B, Cochran    11. Esophageal dysphagia  -     CHANDRA With / DsDNA, RNP, Sjogrens A / B, Cochran    Other orders  -     levocetirizine (XYZAL) 5 MG tablet; Take 1 tablet by mouth Every Evening.  Dispense: 30 tablet; Refill: 2  -     fluticasone (Flonase) 50 MCG/ACT nasal spray; 2 sprays into the nostril(s) as directed by provider Daily.  Dispense: 16 g; Refill: 2       Class 3 Severe Obesity (BMI >=40). Obesity-related health conditions include the following: hypertension and dyslipidemias. Obesity is unchanged. BMI is is above average; BMI management plan is completed. We discussed portion control, increasing exercise and pharmacologic options including wegovy. she will check with insurance. Nutrition and activity goals reviewed including: mainly water to drink, limit white flour/processed sugar, higher lean protein, high fiber carbs, regular meals, working toward 150 mins cardio per week, resistance training 2x/week.    Exacerbation of seasonal allergies. Continue mucinex, decongestnat restart nasal CS, Xyzal. IM Dex as above. abx if no improvement by end of week.    Routine f/u in 3 months, sooner as needed/instructed.  I will contact patient regarding test results and provide instructions regarding any necessary changes in plan of care.  Patient was encouraged to keep me informed of any acute changes, lack of improvement, or any new concerning symptoms.  Pt is aware of reasons to seek emergent care.  Patient voiced understanding of all instructions and denied further questions.    Please note that portions of this note may have been completed with a voice recognition program. Efforts were made to edit the dictations, but occasionally words are mistranscribed.                      Lab Facility: [id number] Lab Facility: [id number]

## 2022-09-27 LAB
ALT SERPL-CCNC: 58 U/L (ref 1–33)
ANA SER QL: NEGATIVE
AST SERPL-CCNC: 42 U/L (ref 1–32)
CA-I SERPL ISE-MCNC: 5.7 MG/DL (ref 4.5–5.6)
HBA1C MFR BLD: 6.7 % (ref 4.8–5.6)
IRON SERPL-MCNC: 49 MCG/DL (ref 37–145)

## 2022-10-24 RX ORDER — POTASSIUM CHLORIDE 20 MEQ/1
TABLET, EXTENDED RELEASE ORAL
Qty: 90 TABLET | Refills: 0 | Status: SHIPPED | OUTPATIENT
Start: 2022-10-24 | End: 2023-02-02

## 2022-12-12 DIAGNOSIS — E11.9 TYPE 2 DIABETES MELLITUS WITHOUT COMPLICATION, WITHOUT LONG-TERM CURRENT USE OF INSULIN: Primary | ICD-10-CM

## 2022-12-12 RX ORDER — SEMAGLUTIDE 1.34 MG/ML
0.25 INJECTION, SOLUTION SUBCUTANEOUS WEEKLY
Qty: 1.5 ML | Refills: 0 | Status: SHIPPED | OUTPATIENT
Start: 2022-12-12 | End: 2023-01-23

## 2023-01-23 DIAGNOSIS — E11.9 TYPE 2 DIABETES MELLITUS WITHOUT COMPLICATION, WITHOUT LONG-TERM CURRENT USE OF INSULIN: ICD-10-CM

## 2023-01-23 RX ORDER — SEMAGLUTIDE 1.34 MG/ML
INJECTION, SOLUTION SUBCUTANEOUS
Qty: 2 ML | Refills: 0 | Status: SHIPPED | OUTPATIENT
Start: 2023-01-23

## 2023-02-02 RX ORDER — POTASSIUM CHLORIDE 20 MEQ/1
TABLET, EXTENDED RELEASE ORAL
Qty: 90 TABLET | Refills: 0 | Status: SHIPPED | OUTPATIENT
Start: 2023-02-02

## 2023-03-17 DIAGNOSIS — I10 ESSENTIAL HYPERTENSION: ICD-10-CM

## 2023-03-17 RX ORDER — CHLORTHALIDONE 25 MG/1
TABLET ORAL
Qty: 90 TABLET | Refills: 0 | Status: SHIPPED | OUTPATIENT
Start: 2023-03-17

## 2023-04-11 DIAGNOSIS — E11.9 TYPE 2 DIABETES MELLITUS WITHOUT COMPLICATION, WITHOUT LONG-TERM CURRENT USE OF INSULIN: ICD-10-CM

## 2023-04-12 RX ORDER — SEMAGLUTIDE 1.34 MG/ML
INJECTION, SOLUTION SUBCUTANEOUS
Qty: 2 ML | Refills: 0 | Status: SHIPPED | OUTPATIENT
Start: 2023-04-12

## 2023-05-10 RX ORDER — POTASSIUM CHLORIDE 20 MEQ/1
TABLET, EXTENDED RELEASE ORAL
Qty: 90 TABLET | Refills: 0 | Status: SHIPPED | OUTPATIENT
Start: 2023-05-10

## 2023-05-30 DIAGNOSIS — E78.49 OTHER HYPERLIPIDEMIA: ICD-10-CM

## 2023-05-30 DIAGNOSIS — E11.9 TYPE 2 DIABETES MELLITUS WITHOUT COMPLICATION, WITHOUT LONG-TERM CURRENT USE OF INSULIN: ICD-10-CM

## 2023-05-31 RX ORDER — ATORVASTATIN CALCIUM 10 MG/1
TABLET, FILM COATED ORAL
Qty: 90 TABLET | Refills: 0 | Status: SHIPPED | OUTPATIENT
Start: 2023-05-31

## 2023-05-31 RX ORDER — SEMAGLUTIDE 0.68 MG/ML
INJECTION, SOLUTION SUBCUTANEOUS
Qty: 3 ML | Refills: 0 | Status: SHIPPED | OUTPATIENT
Start: 2023-05-31

## 2023-07-31 ENCOUNTER — OFFICE VISIT (OUTPATIENT)
Dept: FAMILY MEDICINE CLINIC | Facility: CLINIC | Age: 48
End: 2023-07-31
Payer: COMMERCIAL

## 2023-07-31 VITALS
WEIGHT: 270 LBS | HEIGHT: 65 IN | TEMPERATURE: 98.2 F | OXYGEN SATURATION: 98 % | DIASTOLIC BLOOD PRESSURE: 90 MMHG | BODY MASS INDEX: 44.98 KG/M2 | HEART RATE: 91 BPM | SYSTOLIC BLOOD PRESSURE: 126 MMHG

## 2023-07-31 DIAGNOSIS — Z53.20 MAMMOGRAM DECLINED: ICD-10-CM

## 2023-07-31 DIAGNOSIS — M47.816 ARTHRITIS OF LUMBAR SPINE: ICD-10-CM

## 2023-07-31 DIAGNOSIS — E83.42 HYPOMAGNESEMIA: ICD-10-CM

## 2023-07-31 DIAGNOSIS — E66.01 CLASS 3 SEVERE OBESITY DUE TO EXCESS CALORIES WITH SERIOUS COMORBIDITY AND BODY MASS INDEX (BMI) OF 40.0 TO 44.9 IN ADULT: ICD-10-CM

## 2023-07-31 DIAGNOSIS — Z28.21 PNEUMOCOCCAL VACCINATION DECLINED BY PATIENT: ICD-10-CM

## 2023-07-31 DIAGNOSIS — K76.0 FATTY LIVER: ICD-10-CM

## 2023-07-31 DIAGNOSIS — E83.52 HYPERCALCEMIA: ICD-10-CM

## 2023-07-31 DIAGNOSIS — I10 ESSENTIAL HYPERTENSION: ICD-10-CM

## 2023-07-31 DIAGNOSIS — E11.9 TYPE 2 DIABETES MELLITUS WITHOUT COMPLICATION, WITHOUT LONG-TERM CURRENT USE OF INSULIN: Primary | ICD-10-CM

## 2023-07-31 DIAGNOSIS — M62.838 MUSCLE SPASM: ICD-10-CM

## 2023-07-31 DIAGNOSIS — M51.36 LUMBAR DEGENERATIVE DISC DISEASE: ICD-10-CM

## 2023-07-31 DIAGNOSIS — J01.00 ACUTE NON-RECURRENT MAXILLARY SINUSITIS: ICD-10-CM

## 2023-07-31 DIAGNOSIS — E78.2 MIXED HYPERLIPIDEMIA: ICD-10-CM

## 2023-07-31 DIAGNOSIS — M79.7 FIBROMYALGIA: ICD-10-CM

## 2023-07-31 RX ORDER — DEXAMETHASONE 2 MG/1
2 TABLET ORAL
Qty: 5 TABLET | Refills: 0 | Status: SHIPPED | OUTPATIENT
Start: 2023-07-31 | End: 2023-08-05

## 2023-07-31 RX ORDER — TIZANIDINE HYDROCHLORIDE 6 MG/1
6 CAPSULE, GELATIN COATED ORAL 3 TIMES DAILY
Qty: 90 CAPSULE | Refills: 5 | Status: SHIPPED | OUTPATIENT
Start: 2023-07-31

## 2023-07-31 RX ORDER — MULTIVIT WITH MINERALS/LUTEIN
250 TABLET ORAL DAILY
COMMUNITY

## 2023-07-31 RX ORDER — ZINC SULFATE 66 MG
TABLET ORAL DAILY
COMMUNITY

## 2023-07-31 RX ORDER — AMOXICILLIN AND CLAVULANATE POTASSIUM 875; 125 MG/1; MG/1
1 TABLET, FILM COATED ORAL EVERY 12 HOURS SCHEDULED
Qty: 20 TABLET | Refills: 0 | Status: SHIPPED | OUTPATIENT
Start: 2023-07-31 | End: 2023-08-10

## 2023-08-01 LAB
ALBUMIN SERPL-MCNC: 4.3 G/DL (ref 3.5–5.2)
ALBUMIN/CREAT UR: 56 MG/G CREAT (ref 0–29)
ALBUMIN/GLOB SERPL: 1.9 G/DL
ALP SERPL-CCNC: 122 U/L (ref 39–117)
ALT SERPL-CCNC: 172 U/L (ref 1–33)
AST SERPL-CCNC: 148 U/L (ref 1–32)
BASOPHILS # BLD AUTO: 0.06 10*3/MM3 (ref 0–0.2)
BASOPHILS NFR BLD AUTO: 0.7 % (ref 0–1.5)
BILIRUB SERPL-MCNC: 0.5 MG/DL (ref 0–1.2)
BUN SERPL-MCNC: 15 MG/DL (ref 6–20)
BUN/CREAT SERPL: 26.3 (ref 7–25)
CA-I SERPL ISE-MCNC: 5 MG/DL (ref 4.5–5.6)
CALCIUM SERPL-MCNC: 10.2 MG/DL (ref 8.6–10.5)
CHLORIDE SERPL-SCNC: 98 MMOL/L (ref 98–107)
CHOLEST SERPL-MCNC: 184 MG/DL (ref 0–200)
CO2 SERPL-SCNC: 27.8 MMOL/L (ref 22–29)
CREAT SERPL-MCNC: 0.57 MG/DL (ref 0.57–1)
CREAT UR-MCNC: 86 MG/DL
EGFRCR SERPLBLD CKD-EPI 2021: 110.9 ML/MIN/1.73
EOSINOPHIL # BLD AUTO: 0.43 10*3/MM3 (ref 0–0.4)
EOSINOPHIL NFR BLD AUTO: 4.9 % (ref 0.3–6.2)
ERYTHROCYTE [DISTWIDTH] IN BLOOD BY AUTOMATED COUNT: 12.9 % (ref 12.3–15.4)
GLOBULIN SER CALC-MCNC: 2.3 GM/DL
GLUCOSE SERPL-MCNC: 136 MG/DL (ref 65–99)
HBA1C MFR BLD: 7.4 % (ref 4.8–5.6)
HCT VFR BLD AUTO: 40.4 % (ref 34–46.6)
HDLC SERPL-MCNC: 45 MG/DL (ref 40–60)
HGB BLD-MCNC: 13.1 G/DL (ref 12–15.9)
IMM GRANULOCYTES # BLD AUTO: 0.1 10*3/MM3 (ref 0–0.05)
IMM GRANULOCYTES NFR BLD AUTO: 1.1 % (ref 0–0.5)
LDLC SERPL CALC-MCNC: 110 MG/DL (ref 0–100)
LYMPHOCYTES # BLD AUTO: 1.88 10*3/MM3 (ref 0.7–3.1)
LYMPHOCYTES NFR BLD AUTO: 21.4 % (ref 19.6–45.3)
MAGNESIUM SERPL-MCNC: 1.5 MG/DL (ref 1.6–2.6)
MCH RBC QN AUTO: 25.7 PG (ref 26.6–33)
MCHC RBC AUTO-ENTMCNC: 32.4 G/DL (ref 31.5–35.7)
MCV RBC AUTO: 79.4 FL (ref 79–97)
MICROALBUMIN UR-MCNC: 47.8 UG/ML
MONOCYTES # BLD AUTO: 0.45 10*3/MM3 (ref 0.1–0.9)
MONOCYTES NFR BLD AUTO: 5.1 % (ref 5–12)
NEUTROPHILS # BLD AUTO: 5.86 10*3/MM3 (ref 1.7–7)
NEUTROPHILS NFR BLD AUTO: 66.8 % (ref 42.7–76)
NRBC BLD AUTO-RTO: 0.1 /100 WBC (ref 0–0.2)
PLATELET # BLD AUTO: 217 10*3/MM3 (ref 140–450)
POTASSIUM SERPL-SCNC: 3.8 MMOL/L (ref 3.5–5.2)
PROT SERPL-MCNC: 6.6 G/DL (ref 6–8.5)
PTH-INTACT SERPL-MCNC: 77 PG/ML (ref 15–65)
RBC # BLD AUTO: 5.09 10*6/MM3 (ref 3.77–5.28)
SODIUM SERPL-SCNC: 140 MMOL/L (ref 136–145)
TRIGL SERPL-MCNC: 167 MG/DL (ref 0–150)
TSH SERPL DL<=0.005 MIU/L-ACNC: 1.93 UIU/ML (ref 0.27–4.2)
VLDLC SERPL CALC-MCNC: 29 MG/DL (ref 5–40)
WBC # BLD AUTO: 8.78 10*3/MM3 (ref 3.4–10.8)

## 2023-08-02 RX ORDER — POTASSIUM CHLORIDE 20 MEQ/1
TABLET, EXTENDED RELEASE ORAL
Qty: 90 TABLET | Refills: 0 | Status: SHIPPED | OUTPATIENT
Start: 2023-08-02

## 2023-08-04 DIAGNOSIS — R79.89 ABNORMAL LIVER FUNCTION TESTS: ICD-10-CM

## 2023-08-04 DIAGNOSIS — R16.0 HEPATOMEGALY: ICD-10-CM

## 2023-08-04 DIAGNOSIS — K76.0 FATTY LIVER: Primary | ICD-10-CM

## 2023-08-21 DIAGNOSIS — E11.9 TYPE 2 DIABETES MELLITUS WITHOUT COMPLICATION, WITHOUT LONG-TERM CURRENT USE OF INSULIN: ICD-10-CM

## 2023-08-22 RX ORDER — SEMAGLUTIDE 0.68 MG/ML
INJECTION, SOLUTION SUBCUTANEOUS
Qty: 3 ML | Refills: 0 | Status: SHIPPED | OUTPATIENT
Start: 2023-08-22

## 2023-08-25 DIAGNOSIS — E78.49 OTHER HYPERLIPIDEMIA: ICD-10-CM

## 2023-08-28 RX ORDER — ATORVASTATIN CALCIUM 10 MG/1
10 TABLET, FILM COATED ORAL NIGHTLY
Qty: 90 TABLET | Refills: 0 | Status: SHIPPED | OUTPATIENT
Start: 2023-08-28

## 2023-09-15 DIAGNOSIS — I10 ESSENTIAL HYPERTENSION: ICD-10-CM

## 2023-09-18 RX ORDER — CHLORTHALIDONE 25 MG/1
TABLET ORAL
Qty: 90 TABLET | Refills: 0 | Status: SHIPPED | OUTPATIENT
Start: 2023-09-18

## 2023-09-18 RX ORDER — POTASSIUM CHLORIDE 20 MEQ/1
TABLET, EXTENDED RELEASE ORAL
Qty: 90 TABLET | Refills: 0 | Status: SHIPPED | OUTPATIENT
Start: 2023-09-18

## 2023-10-09 DIAGNOSIS — M79.7 FIBROMYALGIA: ICD-10-CM

## 2023-10-09 RX ORDER — MELOXICAM 15 MG/1
TABLET ORAL
Qty: 90 TABLET | Refills: 0 | Status: SHIPPED | OUTPATIENT
Start: 2023-10-09

## 2023-10-11 DIAGNOSIS — E11.9 TYPE 2 DIABETES MELLITUS WITHOUT COMPLICATION, WITHOUT LONG-TERM CURRENT USE OF INSULIN: ICD-10-CM

## 2023-10-11 RX ORDER — SEMAGLUTIDE 0.68 MG/ML
INJECTION, SOLUTION SUBCUTANEOUS
Qty: 3 ML | Refills: 0 | Status: SHIPPED | OUTPATIENT
Start: 2023-10-11

## 2023-10-11 NOTE — TELEPHONE ENCOUNTER
Rx Refill Note  Requested Prescriptions     Pending Prescriptions Disp Refills    Ozempic, 0.25 or 0.5 MG/DOSE, 2 MG/3ML solution pen-injector [Pharmacy Med Name: Ozempic (0.25 or 0.5 MG/DOSE) 2 MG/3ML Subcutaneous Solution Pen-injector] 3 mL 0     Sig: INJECT 0.25 MG  ONCE A WEEK SUBCUTANEOUSLY      Last office visit with prescribing clinician: 7/31/2023   Last telemedicine visit with prescribing clinician: Visit date not found   Next office visit with prescribing clinician: Visit date not found                         Would you like a call back once the refill request has been completed: [] Yes [] No    If the office needs to give you a call back, can they leave a voicemail: [] Yes [] No    Loren Huff MA  10/11/23, 12:54 EDT

## 2023-11-06 ENCOUNTER — HOSPITAL ENCOUNTER (OUTPATIENT)
Dept: ULTRASOUND IMAGING | Facility: HOSPITAL | Age: 48
Discharge: HOME | End: 2023-11-06
Admitting: FAMILY MEDICINE
Payer: COMMERCIAL

## 2023-11-06 DIAGNOSIS — K76.0 FATTY LIVER: ICD-10-CM

## 2023-11-06 DIAGNOSIS — R79.89 ABNORMAL LIVER FUNCTION TESTS: ICD-10-CM

## 2023-11-06 DIAGNOSIS — R16.0 HEPATOMEGALY: ICD-10-CM

## 2023-11-06 PROCEDURE — 76705 ECHO EXAM OF ABDOMEN: CPT

## 2023-11-07 DIAGNOSIS — K76.0 FATTY LIVER: Primary | ICD-10-CM

## 2023-11-07 DIAGNOSIS — R16.0 HEPATOMEGALY: ICD-10-CM

## 2023-11-13 DIAGNOSIS — E78.49 OTHER HYPERLIPIDEMIA: ICD-10-CM

## 2023-11-13 DIAGNOSIS — M79.7 FIBROMYALGIA: ICD-10-CM

## 2023-11-14 RX ORDER — MELOXICAM 15 MG/1
TABLET ORAL
Qty: 90 TABLET | Refills: 0 | Status: SHIPPED | OUTPATIENT
Start: 2023-11-14

## 2023-11-14 RX ORDER — ATORVASTATIN CALCIUM 10 MG/1
10 TABLET, FILM COATED ORAL NIGHTLY
Qty: 90 TABLET | Refills: 0 | Status: SHIPPED | OUTPATIENT
Start: 2023-11-14

## 2023-12-29 DIAGNOSIS — I10 ESSENTIAL HYPERTENSION: ICD-10-CM

## 2023-12-29 RX ORDER — CHLORTHALIDONE 25 MG/1
TABLET ORAL
Qty: 90 TABLET | Refills: 0 | Status: SHIPPED | OUTPATIENT
Start: 2023-12-29

## 2024-01-09 RX ORDER — POTASSIUM CHLORIDE 20 MEQ/1
20 TABLET, EXTENDED RELEASE ORAL DAILY
Qty: 90 TABLET | Refills: 0 | Status: SHIPPED | OUTPATIENT
Start: 2024-01-09

## 2024-02-21 DIAGNOSIS — E78.49 OTHER HYPERLIPIDEMIA: ICD-10-CM

## 2024-02-21 RX ORDER — ATORVASTATIN CALCIUM 10 MG/1
10 TABLET, FILM COATED ORAL NIGHTLY
Qty: 90 TABLET | Refills: 0 | Status: SHIPPED | OUTPATIENT
Start: 2024-02-21

## 2024-03-24 DIAGNOSIS — I10 ESSENTIAL HYPERTENSION: ICD-10-CM

## 2024-03-26 RX ORDER — CHLORTHALIDONE 25 MG/1
TABLET ORAL
Qty: 90 TABLET | Refills: 0 | Status: SHIPPED | OUTPATIENT
Start: 2024-03-26

## 2024-04-19 DIAGNOSIS — M79.7 FIBROMYALGIA: ICD-10-CM

## 2024-04-19 RX ORDER — POTASSIUM CHLORIDE 20 MEQ/1
20 TABLET, EXTENDED RELEASE ORAL DAILY
Qty: 90 TABLET | Refills: 0 | OUTPATIENT
Start: 2024-04-19

## 2024-04-19 RX ORDER — MELOXICAM 15 MG/1
TABLET ORAL
Qty: 90 TABLET | Refills: 0 | OUTPATIENT
Start: 2024-04-19

## 2024-05-19 DIAGNOSIS — E78.49 OTHER HYPERLIPIDEMIA: ICD-10-CM

## 2024-05-20 RX ORDER — ATORVASTATIN CALCIUM 10 MG/1
10 TABLET, FILM COATED ORAL NIGHTLY
Qty: 90 TABLET | Refills: 0 | Status: SHIPPED | OUTPATIENT
Start: 2024-05-20

## 2024-06-22 DIAGNOSIS — I10 ESSENTIAL HYPERTENSION: ICD-10-CM

## 2024-06-24 RX ORDER — CHLORTHALIDONE 25 MG/1
TABLET ORAL
Qty: 90 TABLET | Refills: 0 | Status: SHIPPED | OUTPATIENT
Start: 2024-06-24

## 2024-06-25 DIAGNOSIS — M79.7 FIBROMYALGIA: ICD-10-CM

## 2024-06-26 RX ORDER — MELOXICAM 15 MG/1
TABLET ORAL
Qty: 90 TABLET | Refills: 0 | OUTPATIENT
Start: 2024-06-26

## 2024-06-26 RX ORDER — POTASSIUM CHLORIDE 20 MEQ/1
TABLET, EXTENDED RELEASE ORAL DAILY
Qty: 90 TABLET | Refills: 0 | OUTPATIENT
Start: 2024-06-26

## 2024-09-04 ENCOUNTER — OFFICE VISIT (OUTPATIENT)
Dept: FAMILY MEDICINE CLINIC | Facility: CLINIC | Age: 49
End: 2024-09-04
Payer: COMMERCIAL

## 2024-09-04 VITALS
OXYGEN SATURATION: 95 % | TEMPERATURE: 98.5 F | HEIGHT: 65 IN | SYSTOLIC BLOOD PRESSURE: 126 MMHG | DIASTOLIC BLOOD PRESSURE: 80 MMHG | BODY MASS INDEX: 40.15 KG/M2 | WEIGHT: 241 LBS | HEART RATE: 109 BPM

## 2024-09-04 DIAGNOSIS — K76.0 FATTY LIVER: ICD-10-CM

## 2024-09-04 DIAGNOSIS — E78.2 MIXED HYPERLIPIDEMIA: ICD-10-CM

## 2024-09-04 DIAGNOSIS — E66.01 CLASS 3 SEVERE OBESITY DUE TO EXCESS CALORIES WITH SERIOUS COMORBIDITY AND BODY MASS INDEX (BMI) OF 40.0 TO 44.9 IN ADULT: ICD-10-CM

## 2024-09-04 DIAGNOSIS — J01.00 ACUTE NON-RECURRENT MAXILLARY SINUSITIS: Primary | ICD-10-CM

## 2024-09-04 DIAGNOSIS — M79.7 FIBROMYALGIA: ICD-10-CM

## 2024-09-04 DIAGNOSIS — I10 ESSENTIAL HYPERTENSION: ICD-10-CM

## 2024-09-04 DIAGNOSIS — E11.65 UNCONTROLLED TYPE 2 DIABETES MELLITUS WITH HYPERGLYCEMIA: ICD-10-CM

## 2024-09-04 LAB
EXPIRATION DATE: ABNORMAL
HBA1C MFR BLD: 10.2 % (ref 4.5–5.7)
Lab: ABNORMAL

## 2024-09-04 PROCEDURE — 96372 THER/PROPH/DIAG INJ SC/IM: CPT | Performed by: FAMILY MEDICINE

## 2024-09-04 PROCEDURE — 83036 HEMOGLOBIN GLYCOSYLATED A1C: CPT | Performed by: FAMILY MEDICINE

## 2024-09-04 PROCEDURE — 99214 OFFICE O/P EST MOD 30 MIN: CPT | Performed by: FAMILY MEDICINE

## 2024-09-04 RX ORDER — MELOXICAM 15 MG/1
15 TABLET ORAL DAILY
Qty: 90 TABLET | Refills: 0 | Status: SHIPPED | OUTPATIENT
Start: 2024-09-04

## 2024-09-04 RX ORDER — METHYLPREDNISOLONE ACETATE 40 MG/ML
40 INJECTION, SUSPENSION INTRA-ARTICULAR; INTRALESIONAL; INTRAMUSCULAR; SOFT TISSUE ONCE
Status: COMPLETED | OUTPATIENT
Start: 2024-09-04 | End: 2024-09-04

## 2024-09-04 RX ORDER — POTASSIUM CHLORIDE 1500 MG/1
20 TABLET, EXTENDED RELEASE ORAL DAILY
Qty: 90 TABLET | Refills: 0 | Status: SHIPPED | OUTPATIENT
Start: 2024-09-04

## 2024-09-04 RX ORDER — CEFTRIAXONE 1 G/1
1 INJECTION, POWDER, FOR SOLUTION INTRAMUSCULAR; INTRAVENOUS ONCE
Status: COMPLETED | OUTPATIENT
Start: 2024-09-04 | End: 2024-09-04

## 2024-09-04 RX ADMIN — METHYLPREDNISOLONE ACETATE 40 MG: 40 INJECTION, SUSPENSION INTRA-ARTICULAR; INTRALESIONAL; INTRAMUSCULAR; SOFT TISSUE at 11:52

## 2024-09-04 RX ADMIN — CEFTRIAXONE 1 G: 1 INJECTION, POWDER, FOR SOLUTION INTRAMUSCULAR; INTRAVENOUS at 11:52

## 2024-09-04 NOTE — PROGRESS NOTES
Subjective   Madalyn Villegas is a 49 y.o. female.     History of Present Illness  Answers submitted by the patient for this visit:  Other (Submitted on 9/4/2024)  Please describe your symptoms.: Sinus pressure, cough and sore throat.  Itchy bumpy skin  Have you had these symptoms before?: Yes  How long have you been having these symptoms?: Greater than 2 weeks  Please list any medications you are currently taking for this condition.: Mucinex, nasal decongestant.   Hydrocortisone cream, anti itch spray  Please describe any probable cause for these symptoms. : Allergies  Primary Reason for Visit (Submitted on 9/4/2024)  What is the primary reason for your visit?: Other    Apparently scheduled for physical but she declines that today. Did have labs prior to apt as requested.    Last seen over a year ago.    Chronic medical conditions include:  HLP - on lipitor, not following heart heatlhy diet  HTN - on chlorthalidone  Type 2 DM - not currently on medication, previously on ozempic. Not able to take metformin due to diarrhea.  Chronic morbid obesity with fatty liver  Vit d def - currently on low dose replacement  FMSx and L-DDD - on xanaflex, mobic    Her main concern today is > 2 weeks of sinus congestion, facial pain, headache, cough. Not responding to usual allergy meds, OTC meds.      The following portions of the patient's history were reviewed and updated as appropriate: allergies, current medications, past family history, past medical history, past social history, past surgical history, and problem list.    Review of Systems   Constitutional:  Positive for fatigue. Negative for diaphoresis and fever.   HENT:  Positive for congestion, facial swelling, postnasal drip, rhinorrhea, sinus pressure and sinus pain. Negative for mouth sores, nosebleeds, sore throat and trouble swallowing.    Eyes:  Negative for pain, discharge, redness and visual disturbance.   Respiratory:  Positive for cough. Negative for shortness  of breath and wheezing.    Cardiovascular:  Positive for leg swelling. Negative for chest pain and palpitations.   Gastrointestinal:  Positive for constipation, diarrhea and nausea. Negative for blood in stool and vomiting.        Heartburn   Endocrine: Positive for heat intolerance. Negative for polydipsia and polyuria.   Genitourinary:  Negative for dysuria and hematuria.   Musculoskeletal:  Positive for arthralgias, back pain and myalgias.   Skin:  Negative for rash and wound.   Neurological:  Positive for headaches. Negative for dizziness, tremors and weakness.   Hematological:  Negative for adenopathy. Bruises/bleeds easily.   Psychiatric/Behavioral:  Negative for confusion, dysphoric mood and sleep disturbance.    Pt's previous ROS reviewed and updated as indicated.       Objective   Vitals:    09/04/24 1047   BP: 126/80   Pulse: 109   Temp: 98.5 °F (36.9 °C)   SpO2: 95%     Body mass index is 40.1 kg/m².      09/04/24  1047   Weight: 109 kg (241 lb)       Physical Exam  Vitals and nursing note reviewed.   Constitutional:       Appearance: She is morbidly obese. She is ill-appearing.   HENT:      Head: Atraumatic.      Right Ear: Ear canal normal. A middle ear effusion is present. Tympanic membrane is retracted.      Left Ear: Ear canal normal. A middle ear effusion is present. Tympanic membrane is retracted.      Nose: Mucosal edema and congestion present.      Mouth/Throat:      Mouth: Mucous membranes are moist. No oral lesions.      Pharynx: Oropharynx is clear.   Eyes:      General: No scleral icterus.     Conjunctiva/sclera: Conjunctivae normal.   Neck:      Thyroid: No thyroid mass.   Cardiovascular:      Rate and Rhythm: Normal rate and regular rhythm.      Pulses: Normal pulses.      Heart sounds: Normal heart sounds.   Pulmonary:      Effort: Pulmonary effort is normal.      Breath sounds: Decreased breath sounds (mildly) present. No wheezing, rhonchi or rales.   Musculoskeletal:      Right lower  leg: No edema.      Left lower leg: No edema.   Lymphadenopathy:      Cervical: No cervical adenopathy.   Skin:     General: Skin is warm and dry.      Coloration: Skin is not jaundiced or pale.      Findings: Rash (erythematous maculopapular on face, chest, arms) present. No bruising.   Neurological:      Mental Status: She is alert and oriented to person, place, and time.      Motor: No tremor.      Gait: Gait is intact.   Psychiatric:         Mood and Affect: Mood and affect normal. Mood is anxious.         Behavior: Behavior normal. Behavior is cooperative.         Cognition and Memory: Cognition normal.       Lab Results   Component Value Date    HGBA1C 10.2 (A) 09/04/2024    HGBA1C 7.40 (H) 07/31/2023    HGBA1C 6.70 (H) 09/26/2022     Lab Results   Component Value Date    MICROALBUR 47.8 07/31/2023    CREATININE 0.57 07/31/2023     Lab Results   Component Value Date    WBC 8.78 07/31/2023    HGB 13.1 07/31/2023    HCT 40.4 07/31/2023    MCV 79.4 07/31/2023     07/31/2023       Lab Results   Component Value Date    GLUCOSE 136 (H) 07/31/2023    BUN 15 07/31/2023    CREATININE 0.57 07/31/2023    EGFRIFNONA 111 05/12/2021    EGFRIFAFRI 132 11/03/2020    BCR 26.3 (H) 07/31/2023    K 3.8 07/31/2023    CO2 27.8 07/31/2023    CALCIUM 10.2 07/31/2023    PROTENTOTREF 6.6 07/31/2023    ALBUMIN 4.3 07/31/2023    LABIL2 1.9 07/31/2023     (H) 07/31/2023     (H) 07/31/2023       Lab Results   Component Value Date    CHOL 228 (H) 07/18/2016    CHLPL 184 07/31/2023    TRIG 167 (H) 07/31/2023    HDL 45 07/31/2023     (H) 07/31/2023     Lab Results   Component Value Date    TSH 1.930 07/31/2023       Assessment & Plan   Diagnoses and all orders for this visit:    1. Acute non-recurrent maxillary sinusitis (Primary)  -     cefTRIAXone (ROCEPHIN) injection 1 g  -     methylPREDNISolone acetate (DEPO-medrol) injection 40 mg    2. Fibromyalgia  -     meloxicam (MOBIC) 15 MG tablet; Take 1 tablet by  mouth Daily.  Dispense: 90 tablet; Refill: 0  -     TiZANidine (ZANAFLEX) 6 MG capsule; Take 1 capsule by mouth 3 (Three) Times a Day.  Dispense: 90 capsule; Refill: 3    3. Uncontrolled type 2 diabetes mellitus with hyperglycemia  -     POC Glycosylated Hemoglobin (Hb A1C)  -     Tirzepatide (MOUNJARO) 2.5 MG/0.5ML solution pen-injector pen; Inject 0.5 mL under the skin into the appropriate area as directed 1 (One) Time Per Week.  Dispense: 2 mL; Refill: 0    4. Essential hypertension  -     chlorthalidone (HYGROTON) 25 MG tablet; Take 1 tablet by mouth Daily.  Dispense: 90 tablet; Refill: 3    5. Class 3 severe obesity due to excess calories with serious comorbidity and body mass index (BMI) of 40.0 to 44.9 in adult    6. Fatty liver    7. Mixed hyperlipidemia  -     atorvastatin (LIPITOR) 10 MG tablet; Take 1 tablet by mouth Every Night.  Dispense: 90 tablet; Refill: 3    Other orders  -     potassium chloride (KLOR-CON M20) 20 MEQ CR tablet; Take 1 tablet by mouth Daily.  Dispense: 90 tablet; Refill: 0  -     amoxicillin-clavulanate (AUGMENTIN) 875-125 MG per tablet; Take 1 tablet by mouth Every 12 (Twelve) Hours for 7 days.  Dispense: 14 tablet; Refill: 0  -     dexAMETHasone (DECADRON) 0.5 MG tablet; 6 po day 1, then decrease by 1 pill each day until gone (6,5,4,3,2,1)  Dispense: 21 tablet; Refill: 0       I have reviewed risks/benefits and potential side effects of various treatment options for uncontrolled DM in setting of morbid obesity, fatty liver. Pt voices understanding and wishes to proceed with trial of Mounjaro as above. Not able to tolerate metformin. She will consider glipizide.    HLP - continue statin.    HTN - continue chlorthalidone.    She declines mammogram, cervical CA screening, pneumococcal vaccination.    F/u in 1 month for f/u and routine physical exam, f/u sooner as needed.  Patient was encouraged to keep me informed of any acute changes, lack of improvement, or any new concerning  symptoms.  Pt is aware of reasons to seek emergent care.  Patient voiced understanding of all instructions and denied further questions.    Please note that portions of this note may have been completed with a voice recognition program.

## 2024-09-06 ENCOUNTER — TELEPHONE (OUTPATIENT)
Dept: FAMILY MEDICINE CLINIC | Facility: CLINIC | Age: 49
End: 2024-09-06

## 2024-09-06 NOTE — TELEPHONE ENCOUNTER
Caller: Madalyn Villegas    Relationship to patient: Self    Best call back number: [phone]     Patient is needing: PATIENT IS CALLING TO CHECK ON THE STATUS OF SEVERAL MEDICATIONS THAT SHE STATES WERE DISCUSSED WITH DR GREGORY AT HER PREVIOUS APPOINTMENT    PATIENT STATES THAT HER PHARMACY HAS NOT RECEIVED THE NEW MEDICATION TO HELP WITH HER DIABETES, THE ANTIBIOTIC, OR THE INHALER     PLEASE ADVISE

## 2024-09-06 NOTE — TELEPHONE ENCOUNTER
PATIENT IS REQUESTING HER SUGAR MEDICINE, ANTIBIOTIC AND INHALR THAT SHE SPOKE WITH PCP ABOUT AT LAST APPT. SHE HAS NOT RECEIVED THOSE

## 2024-09-10 DIAGNOSIS — M79.7 FIBROMYALGIA: ICD-10-CM

## 2024-09-10 RX ORDER — DEXAMETHASONE 0.5 MG/1
TABLET ORAL
Qty: 21 TABLET | Refills: 0 | Status: SHIPPED | OUTPATIENT
Start: 2024-09-10

## 2024-09-10 RX ORDER — CHLORTHALIDONE 25 MG/1
25 TABLET ORAL DAILY
Qty: 90 TABLET | Refills: 3 | Status: SHIPPED | OUTPATIENT
Start: 2024-09-10

## 2024-09-10 RX ORDER — TIZANIDINE HYDROCHLORIDE 6 MG/1
6 CAPSULE, GELATIN COATED ORAL 3 TIMES DAILY
Qty: 90 CAPSULE | Refills: 3 | Status: SHIPPED | OUTPATIENT
Start: 2024-09-10

## 2024-09-10 RX ORDER — ATORVASTATIN CALCIUM 10 MG/1
10 TABLET, FILM COATED ORAL NIGHTLY
Qty: 90 TABLET | Refills: 3 | Status: SHIPPED | OUTPATIENT
Start: 2024-09-10

## 2024-09-10 RX ORDER — TIZANIDINE HYDROCHLORIDE 6 MG/1
6 CAPSULE, GELATIN COATED ORAL 3 TIMES DAILY
Qty: 90 CAPSULE | Refills: 0 | OUTPATIENT
Start: 2024-09-10

## 2024-09-11 RX ORDER — GLIPIZIDE 5 MG/1
5 TABLET ORAL
Qty: 180 TABLET | Refills: 0 | Status: SHIPPED | OUTPATIENT
Start: 2024-09-11

## 2024-09-17 DIAGNOSIS — E11.65 UNCONTROLLED TYPE 2 DIABETES MELLITUS WITH HYPERGLYCEMIA: Primary | ICD-10-CM

## 2024-09-17 RX ORDER — GLIPIZIDE 10 MG/1
10 TABLET ORAL
Qty: 180 TABLET | Refills: 1 | Status: SHIPPED | OUTPATIENT
Start: 2024-09-17

## 2024-10-10 DIAGNOSIS — E11.65 UNCONTROLLED TYPE 2 DIABETES MELLITUS WITH HYPERGLYCEMIA: Primary | ICD-10-CM

## 2024-10-10 RX ORDER — SAXAGLIPTIN 5 MG/1
5 TABLET, FILM COATED ORAL DAILY
Qty: 90 TABLET | Refills: 1 | Status: SHIPPED | OUTPATIENT
Start: 2024-10-10

## 2024-11-25 ENCOUNTER — OFFICE VISIT (OUTPATIENT)
Dept: FAMILY MEDICINE CLINIC | Facility: CLINIC | Age: 49
End: 2024-11-25
Payer: COMMERCIAL

## 2024-11-25 VITALS
SYSTOLIC BLOOD PRESSURE: 124 MMHG | WEIGHT: 254.6 LBS | HEIGHT: 65 IN | DIASTOLIC BLOOD PRESSURE: 80 MMHG | HEART RATE: 90 BPM | OXYGEN SATURATION: 97 % | BODY MASS INDEX: 42.42 KG/M2

## 2024-11-25 DIAGNOSIS — J40 BRONCHITIS: ICD-10-CM

## 2024-11-25 DIAGNOSIS — I10 ESSENTIAL HYPERTENSION: ICD-10-CM

## 2024-11-25 DIAGNOSIS — K76.0 FATTY LIVER: ICD-10-CM

## 2024-11-25 DIAGNOSIS — Z53.20 SCREENING MAMMOGRAPHY DECLINED: ICD-10-CM

## 2024-11-25 DIAGNOSIS — R29.818 SUSPECTED SLEEP APNEA: ICD-10-CM

## 2024-11-25 DIAGNOSIS — Z00.00 WELL ADULT EXAM: Primary | ICD-10-CM

## 2024-11-25 DIAGNOSIS — R79.89 ELEVATED PTHRP LEVEL: ICD-10-CM

## 2024-11-25 DIAGNOSIS — E78.2 MIXED HYPERLIPIDEMIA: ICD-10-CM

## 2024-11-25 DIAGNOSIS — M79.7 FIBROMYALGIA: ICD-10-CM

## 2024-11-25 DIAGNOSIS — E66.813 CLASS 3 SEVERE OBESITY DUE TO EXCESS CALORIES WITH SERIOUS COMORBIDITY AND BODY MASS INDEX (BMI) OF 40.0 TO 44.9 IN ADULT: ICD-10-CM

## 2024-11-25 DIAGNOSIS — E83.42 HYPOMAGNESEMIA: ICD-10-CM

## 2024-11-25 DIAGNOSIS — E11.65 UNCONTROLLED TYPE 2 DIABETES MELLITUS WITH HYPERGLYCEMIA: ICD-10-CM

## 2024-11-25 DIAGNOSIS — Z28.21 PNEUMOCOCCAL VACCINATION DECLINED BY PATIENT: ICD-10-CM

## 2024-11-25 DIAGNOSIS — E66.01 CLASS 3 SEVERE OBESITY DUE TO EXCESS CALORIES WITH SERIOUS COMORBIDITY AND BODY MASS INDEX (BMI) OF 40.0 TO 44.9 IN ADULT: ICD-10-CM

## 2024-11-25 DIAGNOSIS — Z28.21 INFLUENZA VACCINATION DECLINED BY PATIENT: ICD-10-CM

## 2024-11-25 PROCEDURE — 99396 PREV VISIT EST AGE 40-64: CPT | Performed by: FAMILY MEDICINE

## 2024-11-25 PROCEDURE — 99214 OFFICE O/P EST MOD 30 MIN: CPT | Performed by: FAMILY MEDICINE

## 2024-11-25 RX ORDER — ALBUTEROL SULFATE 90 UG/1
2 INHALANT RESPIRATORY (INHALATION) EVERY 4 HOURS PRN
Qty: 18 G | Refills: 0 | Status: CANCELLED | OUTPATIENT
Start: 2024-11-25

## 2024-11-25 NOTE — PROGRESS NOTES
Subjective   Madalyn Villegas is a 49 y.o. female.     History of Present Illness  Here for annual exam.   Reproductive History    Sexual Activity: not currently  Contraception: abstinence/barrier  Menses: none, had ablation due to DUB  H/O abnormal pap: no  Cervical procedures: none     Social Hx  Marital status: single  Tobacco use: quit   EtOH use: denies  Illicit drug use: denies  Employed: [employer]     Lifestyle  Diet: varied, excess calories  Exercise: none at this time  Using seatbelt: ues  Mental Health: stable  Feels safe in home: yes     Screenings  Last pap:   Last mammogram: never  Colonoscopy: 2017, no polyps  DEXA: n/a  FLP:   BG/A1c:   Vitamin D: unsure  Last dental check up: earlier this month  Last vision exam: approx 1 year  Immunizations UTD: no  Needs Pneumococcal, influenza, but declines     She declines all vaccinations, declined cervical cancer screening, declines mammogram.     Requesting Intermittent FMLA - migraines, BPPV, IBSx, endometriosis.  - start date is date of signature  Days missing per month, 4 days total per month  Days at a time  - 1-2 days     Staying fatigued, snores. Previously recommended sleep study but has not yet had.     Recurrent muscle spasm in back. Previously low magnesium. Using muscle relaxant prn    Other chronic conditions include:  Chronic morbid obesity  Uncontrolled NIDDM- on glipizide, onglyza  HLP- on atorvastatin  HTN- on chlorthalidone  [facility]    Reports having had Bronchitis last week, getting better    Pt's previous HPI reviewed and updated as indicated.       The following portions of the patient's history were reviewed and updated as appropriate: allergies, current medications, past family history, past medical history, past social history, past surgical history, and problem list.    Review of Systems   Constitutional:  Positive for fatigue. Negative for diaphoresis and fever.   HENT:  Positive for congestion, postnasal drip and sinus  pressure. Negative for mouth sores, nosebleeds, rhinorrhea, sore throat and trouble swallowing.    Eyes:  Negative for pain, discharge, redness and visual disturbance.   Respiratory:  Positive for cough and shortness of breath (mild with exertion). Negative for wheezing.    Cardiovascular:  Negative for chest pain, palpitations and leg swelling.   Gastrointestinal:  Positive for constipation, diarrhea and nausea. Negative for blood in stool and vomiting.        Heartburn   Endocrine: Positive for heat intolerance. Negative for polydipsia and polyuria.   Genitourinary:  Negative for dysuria and hematuria.   Musculoskeletal:  Positive for arthralgias, back pain and myalgias.   Skin:  Negative for rash and wound.   Neurological:  Positive for headaches. Negative for dizziness, tremors and weakness.   Hematological:  Negative for adenopathy. Bruises/bleeds easily.   Psychiatric/Behavioral:  Negative for confusion, dysphoric mood and sleep disturbance.    Pt's previous ROS reviewed and updated as indicated.       Objective   Vitals:    11/25/24 1115   BP: 124/80   Pulse: 90   SpO2: 97%     Body mass index is 42.37 kg/m².      11/25/24  1115   Weight: 115 kg (254 lb 9.6 oz)       Physical Exam  Vitals and nursing note reviewed.   Constitutional:       Appearance: She is well-groomed. She is morbidly obese. She is not ill-appearing.   HENT:      Head: Atraumatic.      Right Ear: Tympanic membrane, ear canal and external ear normal.      Left Ear: Tympanic membrane, ear canal and external ear normal.      Nose: Congestion present. No rhinorrhea.      Mouth/Throat:      Mouth: Mucous membranes are moist. No oral lesions.      Pharynx: Oropharynx is clear.      Comments: Crowded posterior oropharynx  Eyes:      General: No scleral icterus.     Conjunctiva/sclera: Conjunctivae normal.   Neck:      Thyroid: No thyroid mass.   Cardiovascular:      Rate and Rhythm: Normal rate and regular rhythm.      Pulses: Normal pulses.       Heart sounds: Normal heart sounds.   Pulmonary:      Effort: Pulmonary effort is normal.      Breath sounds: Normal air entry. Decreased breath sounds (mildly) present.   Chest:      Comments: Declined breast exam  Abdominal:      General: Bowel sounds are normal. There is no distension.      Palpations: There is no mass.      Tenderness: There is no abdominal tenderness.      Comments: Exam limited by body habitus   Genitourinary:     Comments: She declines PAP/PELVIC  Musculoskeletal:      Right lower leg: No edema.      Left lower leg: No edema.   Lymphadenopathy:      Cervical: No cervical adenopathy.   Skin:     General: Skin is warm and dry.      Coloration: Skin is not jaundiced or pale.      Findings: No bruising or rash.   Neurological:      Mental Status: She is alert and oriented to person, place, and time.      Motor: No tremor.      Gait: Gait is intact.   Psychiatric:         Mood and Affect: Mood and affect normal.         Speech: Speech normal.         Behavior: Behavior normal. Behavior is cooperative.         Thought Content: Thought content normal.         Cognition and Memory: Cognition normal.     Pt's previous physical exam reviewed and updated as indicated.      Assessment & Plan   Diagnoses and all orders for this visit:    1. Well adult exam (Primary)  -     CBC & Differential  -     Comprehensive Metabolic Panel  -     Lipid Panel  -     Hemoglobin A1c  -     TSH Rfx On Abnormal To Free T4    2. Uncontrolled type 2 diabetes mellitus with hyperglycemia  -     Microalbumin / Creatinine Urine Ratio - Urine, Clean Catch    3. Hypomagnesemia  -     Magnesium    4. Elevated PTHrP level  -     PTH, Intact    5. Mixed hyperlipidemia    6. Essential hypertension    7. Class 3 severe obesity due to excess calories with serious comorbidity and body mass index (BMI) of 40.0 to 44.9 in adult  -     Ambulatory Referral to Sleep Medicine    8. Fatty liver    9. Bronchitis  -     albuterol sulfate   (90 Base) MCG/ACT inhaler; Inhale 2 puffs Every 4 (Four) Hours As Needed for Wheezing.  Dispense: 18 g; Refill: 0    10. Influenza vaccination declined by patient    11. Pneumococcal vaccination declined by patient    12. Suspected sleep apnea  -     Ambulatory Referral to Sleep Medicine    13. Screening mammography declined    Other orders  -     fluticasone (Flonase) 50 MCG/ACT nasal spray; Administer 2 sprays into the nostril(s) as directed by provider Daily.  Dispense: 16 g; Refill: 2  -     levocetirizine (XYZAL) 5 MG tablet; Take 1 tablet by mouth Every Evening.  Dispense: 30 tablet; Refill: 2       Age appropriate preventive care reviewed including cancer screenings, safety measures, mental health concerns, supplements, prevention of CV disease, etc. Handout provided.    Class 3 Severe Obesity (BMI >=40). Obesity-related health conditions include the following: hypertension, diabetes mellitus, dyslipidemias, and hepatic steatosis. Obesity is worsening. BMI is is above average; BMI management plan is completed. We discussed low calorie, low carb based diet program, portion control, increasing exercise, joining a fitness center or start home based exercise program, Weight Watchers or other Commercial based weight reduction program, consulting a Bariatric surgeon, management of depression/anxiety/stress to control compensatory eating, and an estefania-based approach such as [employer] Pal or Lose It. Nutrition and activity goals reviewed including: mainly water to drink, limit white flour/processed sugar, higher lean protein, high fiber carbs, regular meals, working toward 150 mins cardio per week.    HTN - continue chlorthalidone  HLP - continue atorvastatin  NIDDM - recheck A1c, adjust tx as indicated. Continue statin, onglyza. Patient encouraged to take meds as rx'd, follow diabetic appropriate diet, exercise daily, perform feet check daily, and have yearly diabetic eye exams.    Assess status of vit/min  deficiencies and replace as indicated.    She is reminded she is overdue for cervical CA screening and encouraged to schedule with me for GYN at her earliest convenience.    She declines mammogram at this time.    Routine f/u in 3 months, sooner as needed/instructed.  I will contact patient regarding test results and provide instructions regarding any necessary changes in plan of care.  Patient was encouraged to keep me informed of any acute changes, lack of improvement, or any new concerning symptoms.  Pt is aware of reasons to seek emergent care.  Patient voiced understanding of all instructions and denied further questions.    Please note that portions of this note may have been completed with a voice recognition program.

## 2024-11-26 LAB
ALBUMIN SERPL-MCNC: 4.6 G/DL (ref 3.8–4.9)
ALBUMIN/CREAT UR: 16 MG/G CREAT (ref 0–29)
ALP SERPL-CCNC: 108 IU/L (ref 44–121)
ALT SERPL-CCNC: 107 IU/L (ref 0–32)
AST SERPL-CCNC: 82 IU/L (ref 0–40)
BASOPHILS # BLD AUTO: 0.1 X10E3/UL (ref 0–0.2)
BASOPHILS NFR BLD AUTO: 1 %
BILIRUB SERPL-MCNC: 0.6 MG/DL (ref 0–1.2)
BUN SERPL-MCNC: 14 MG/DL (ref 6–24)
BUN/CREAT SERPL: 25 (ref 9–23)
CALCIUM SERPL-MCNC: 10.2 MG/DL (ref 8.7–10.2)
CHLORIDE SERPL-SCNC: 98 MMOL/L (ref 96–106)
CHOLEST SERPL-MCNC: 214 MG/DL (ref 100–199)
CO2 SERPL-SCNC: 24 MMOL/L (ref 20–29)
CREAT SERPL-MCNC: 0.55 MG/DL (ref 0.57–1)
CREAT UR-MCNC: 89 MG/DL
EGFRCR SERPLBLD CKD-EPI 2021: 111 ML/MIN/1.73
EOSINOPHIL # BLD AUTO: 0.4 X10E3/UL (ref 0–0.4)
EOSINOPHIL NFR BLD AUTO: 4 %
ERYTHROCYTE [DISTWIDTH] IN BLOOD BY AUTOMATED COUNT: 13.2 % (ref 11.7–15.4)
GLOBULIN SER CALC-MCNC: 2.6 G/DL (ref 1.5–4.5)
GLUCOSE SERPL-MCNC: 128 MG/DL (ref 70–99)
HBA1C MFR BLD: 7.5 % (ref 4.8–5.6)
HCT VFR BLD AUTO: 44.7 % (ref 34–46.6)
HDLC SERPL-MCNC: 47 MG/DL
HGB BLD-MCNC: 15 G/DL (ref 11.1–15.9)
IMM GRANULOCYTES # BLD AUTO: 0.1 X10E3/UL (ref 0–0.1)
IMM GRANULOCYTES NFR BLD AUTO: 1 %
LDLC SERPL CALC-MCNC: 135 MG/DL (ref 0–99)
LYMPHOCYTES # BLD AUTO: 1.8 X10E3/UL (ref 0.7–3.1)
LYMPHOCYTES NFR BLD AUTO: 17 %
MAGNESIUM SERPL-MCNC: 1.6 MG/DL (ref 1.6–2.3)
MCH RBC QN AUTO: 27.6 PG (ref 26.6–33)
MCHC RBC AUTO-ENTMCNC: 33.6 G/DL (ref 31.5–35.7)
MCV RBC AUTO: 82 FL (ref 79–97)
MICROALBUMIN UR-MCNC: 14.2 UG/ML
MONOCYTES # BLD AUTO: 0.6 X10E3/UL (ref 0.1–0.9)
MONOCYTES NFR BLD AUTO: 5 %
NEUTROPHILS # BLD AUTO: 7.8 X10E3/UL (ref 1.4–7)
NEUTROPHILS NFR BLD AUTO: 72 %
PLATELET # BLD AUTO: 229 X10E3/UL (ref 150–450)
POTASSIUM SERPL-SCNC: 3.8 MMOL/L (ref 3.5–5.2)
PROT SERPL-MCNC: 7.2 G/DL (ref 6–8.5)
PTH-INTACT SERPL-MCNC: 52 PG/ML (ref 15–65)
RBC # BLD AUTO: 5.44 X10E6/UL (ref 3.77–5.28)
SODIUM SERPL-SCNC: 139 MMOL/L (ref 134–144)
TRIGL SERPL-MCNC: 177 MG/DL (ref 0–149)
TSH SERPL DL<=0.005 MIU/L-ACNC: 1.38 UIU/ML (ref 0.45–4.5)
VLDLC SERPL CALC-MCNC: 32 MG/DL (ref 5–40)
WBC # BLD AUTO: 10.7 X10E3/UL (ref 3.4–10.8)

## 2024-11-26 RX ORDER — ATORVASTATIN CALCIUM 10 MG/1
10 TABLET, FILM COATED ORAL NIGHTLY
Qty: 90 TABLET | Refills: 3 | Status: SHIPPED | OUTPATIENT
Start: 2024-11-26

## 2024-11-26 RX ORDER — ALBUTEROL SULFATE 90 UG/1
2 INHALANT RESPIRATORY (INHALATION) EVERY 4 HOURS PRN
Qty: 18 G | Refills: 0 | Status: SHIPPED | OUTPATIENT
Start: 2024-11-26

## 2024-11-26 RX ORDER — GLIPIZIDE 10 MG/1
10 TABLET ORAL
Qty: 180 TABLET | Refills: 1 | Status: SHIPPED | OUTPATIENT
Start: 2024-11-26

## 2024-11-26 RX ORDER — FLUTICASONE PROPIONATE 50 MCG
2 SPRAY, SUSPENSION (ML) NASAL DAILY
Qty: 16 G | Refills: 2 | Status: SHIPPED | OUTPATIENT
Start: 2024-11-26

## 2024-11-26 RX ORDER — CHLORTHALIDONE 25 MG/1
25 TABLET ORAL DAILY
Qty: 90 TABLET | Refills: 3 | Status: SHIPPED | OUTPATIENT
Start: 2024-11-26

## 2024-11-26 RX ORDER — MELOXICAM 15 MG/1
15 TABLET ORAL DAILY
Qty: 90 TABLET | Refills: 0 | Status: SHIPPED | OUTPATIENT
Start: 2024-11-26

## 2024-11-26 RX ORDER — LEVOCETIRIZINE DIHYDROCHLORIDE 5 MG/1
5 TABLET, FILM COATED ORAL EVERY EVENING
Qty: 30 TABLET | Refills: 2 | Status: SHIPPED | OUTPATIENT
Start: 2024-11-26

## 2024-11-26 RX ORDER — TIZANIDINE HYDROCHLORIDE 6 MG/1
6 CAPSULE, GELATIN COATED ORAL 3 TIMES DAILY
Qty: 90 CAPSULE | Refills: 3 | Status: SHIPPED | OUTPATIENT
Start: 2024-11-26

## 2024-11-26 RX ORDER — SAXAGLIPTIN 5 MG/1
5 TABLET, FILM COATED ORAL DAILY
Qty: 90 TABLET | Refills: 1 | Status: SHIPPED | OUTPATIENT
Start: 2024-11-26

## 2024-11-26 RX ORDER — POTASSIUM CHLORIDE 1500 MG/1
20 TABLET, EXTENDED RELEASE ORAL DAILY
Qty: 90 TABLET | Refills: 0 | Status: SHIPPED | OUTPATIENT
Start: 2024-11-26

## 2024-11-27 DIAGNOSIS — R16.0 HEPATOMEGALY: Primary | ICD-10-CM

## 2024-11-27 DIAGNOSIS — K75.81 NASH (NONALCOHOLIC STEATOHEPATITIS): ICD-10-CM

## 2025-01-02 ENCOUNTER — TELEPHONE (OUTPATIENT)
Dept: FAMILY MEDICINE CLINIC | Facility: CLINIC | Age: 50
End: 2025-01-02
Payer: COMMERCIAL

## 2025-02-24 DIAGNOSIS — M79.7 FIBROMYALGIA: ICD-10-CM

## 2025-02-24 RX ORDER — MELOXICAM 15 MG/1
15 TABLET ORAL DAILY
Qty: 90 TABLET | Refills: 0 | Status: SHIPPED | OUTPATIENT
Start: 2025-02-24

## 2025-03-03 ENCOUNTER — OFFICE VISIT (OUTPATIENT)
Dept: FAMILY MEDICINE CLINIC | Facility: CLINIC | Age: 50
End: 2025-03-03
Payer: COMMERCIAL

## 2025-03-03 VITALS
WEIGHT: 268.8 LBS | HEIGHT: 65 IN | OXYGEN SATURATION: 97 % | BODY MASS INDEX: 44.79 KG/M2 | DIASTOLIC BLOOD PRESSURE: 78 MMHG | SYSTOLIC BLOOD PRESSURE: 118 MMHG | HEART RATE: 78 BPM

## 2025-03-03 DIAGNOSIS — E66.813 CLASS 3 SEVERE OBESITY DUE TO EXCESS CALORIES WITH SERIOUS COMORBIDITY AND BODY MASS INDEX (BMI) OF 40.0 TO 44.9 IN ADULT: ICD-10-CM

## 2025-03-03 DIAGNOSIS — E78.2 MIXED HYPERLIPIDEMIA: ICD-10-CM

## 2025-03-03 DIAGNOSIS — E66.01 CLASS 3 SEVERE OBESITY DUE TO EXCESS CALORIES WITH SERIOUS COMORBIDITY AND BODY MASS INDEX (BMI) OF 40.0 TO 44.9 IN ADULT: ICD-10-CM

## 2025-03-03 DIAGNOSIS — R21 MACULOPAPULAR RASH: ICD-10-CM

## 2025-03-03 DIAGNOSIS — M79.7 FIBROMYALGIA: ICD-10-CM

## 2025-03-03 DIAGNOSIS — Z12.31 ENCOUNTER FOR SCREENING MAMMOGRAM FOR MALIGNANT NEOPLASM OF BREAST: ICD-10-CM

## 2025-03-03 DIAGNOSIS — K76.0 FATTY LIVER: ICD-10-CM

## 2025-03-03 DIAGNOSIS — I10 ESSENTIAL HYPERTENSION: ICD-10-CM

## 2025-03-03 DIAGNOSIS — E11.65 UNCONTROLLED TYPE 2 DIABETES MELLITUS WITH HYPERGLYCEMIA: Primary | ICD-10-CM

## 2025-03-03 DIAGNOSIS — Z28.21 PNEUMOCOCCAL VACCINATION DECLINED BY PATIENT: ICD-10-CM

## 2025-03-03 LAB
EXPIRATION DATE: ABNORMAL
HBA1C MFR BLD: 8.2 % (ref 4.5–5.7)
Lab: ABNORMAL

## 2025-03-03 RX ORDER — PHENTERMINE HYDROCHLORIDE 37.5 MG/1
TABLET ORAL
Qty: 30 TABLET | Refills: 0 | Status: SHIPPED | OUTPATIENT
Start: 2025-03-03

## 2025-03-03 NOTE — PROGRESS NOTES
Subjective   Madalyn Villegas is a 49 y.o. female.     History of Present Illness  Here for routine follow-up on her chronic problems:  IDDM uncontrolled-currently on glipizide, Onglyza.  Well-tolerated diet.  No regular exercise.  Hyperlipidemia-taking low-dose Lipitor.  Tolerating well.  Not following heart healthy diet.  Chronic morbid obesity with fatty liver-struggles with caloric restriction.  Has had significant weight loss in the past with lifestyle modification, medical management.  Difficulty affording medications at this time.  Fibromyalgia-symptoms generally stable.  Using muscle relaxant as needed, Mobic.  Multiple allergies, chronic rash-flares with heat, certain exposures.  Responds to topical corticosteroid if mild but not once flared.    She is overdue for several preventive care measures including breast cancer screening, pneumococcal vaccination, diabetic eye exam, cervical cancer screening.  She declines all of the above other than is amenable to scheduling mammogram.  Encouraged to schedule yearly eye exam.    Previously referred to gastroenterology, liver ultrasound ordered due to abnormal LFTs in the setting of fatty liver.  Unfortunately she does not be able to afford this time, declines follow-up with gastroenterology.      The following portions of the patient's history were reviewed and updated as appropriate: allergies, current medications, past family history, past medical history, past social history, past surgical history, and problem list.    Review of Systems   Constitutional:  Positive for fatigue and unexpected weight change. Negative for diaphoresis and fever.   HENT:  Positive for congestion, postnasal drip and sinus pressure. Negative for mouth sores, nosebleeds, rhinorrhea, sore throat and trouble swallowing.    Eyes:  Negative for pain, discharge, redness and visual disturbance.   Respiratory:  Positive for cough and shortness of breath (mild with exertion). Negative for  wheezing.    Cardiovascular:  Negative for chest pain, palpitations and leg swelling.   Gastrointestinal:  Positive for constipation, diarrhea and nausea. Negative for blood in stool and vomiting.        Heartburn   Endocrine: Positive for heat intolerance. Negative for polydipsia and polyuria.   Genitourinary:  Negative for dysuria and hematuria.   Musculoskeletal:  Positive for arthralgias, back pain and myalgias.   Skin:  Negative for rash and wound.   Neurological:  Positive for headaches. Negative for dizziness, tremors and weakness.   Hematological:  Negative for adenopathy. Bruises/bleeds easily.   Psychiatric/Behavioral:  Negative for confusion, dysphoric mood and sleep disturbance.    Pt's previous ROS reviewed and updated as indicated.       Objective   Vitals:    03/03/25 0803   BP: 118/78   Pulse: 78   SpO2: 97%     Body mass index is 44.73 kg/m².      03/03/25 0803   Weight: 122 kg (268 lb 12.8 oz)       Physical Exam  Vitals and nursing note reviewed.   Constitutional:       Appearance: She is well-groomed. She is morbidly obese. She is not ill-appearing.   HENT:      Head: Atraumatic.      Mouth/Throat:      Mouth: Mucous membranes are moist. No oral lesions.      Pharynx: Oropharynx is clear.      Comments: Crowded posterior oropharynx  Eyes:      General: No scleral icterus.     Conjunctiva/sclera: Conjunctivae normal.   Neck:      Thyroid: No thyroid mass.   Cardiovascular:      Rate and Rhythm: Normal rate and regular rhythm.      Pulses: Normal pulses.      Heart sounds: Normal heart sounds.   Pulmonary:      Effort: Pulmonary effort is normal.      Breath sounds: Decreased breath sounds (mildly) present. No wheezing, rhonchi or rales.   Musculoskeletal:      Right lower leg: No edema.      Left lower leg: No edema.   Lymphadenopathy:      Cervical: No cervical adenopathy.   Skin:     General: Skin is warm and dry.      Coloration: Skin is not jaundiced or pale.      Findings: Rash (scattered  erythematous maculopapular) present. No bruising.   Neurological:      Mental Status: She is alert and oriented to person, place, and time.      Motor: No tremor.      Gait: Gait is intact.   Psychiatric:         Mood and Affect: Mood and affect normal.         Behavior: Behavior normal. Behavior is cooperative.         Cognition and Memory: Cognition normal.     Pt's previous physical exam reviewed and updated as indicated.    Lab Results   Component Value Date    HGBA1C 8.2 (A) 03/03/2025    HGBA1C 7.5 (H) 11/25/2024    HGBA1C 10.2 (A) 09/04/2024     Lab Results   Component Value Date    MICROALBUR 14.2 11/25/2024    CREATININE 0.55 (L) 11/25/2024     Lab Results   Component Value Date    WBC 10.7 11/25/2024    HGB 15.0 11/25/2024    HCT 44.7 11/25/2024    MCV 82 11/25/2024     11/25/2024       Lab Results   Component Value Date    GLUCOSE 128 (H) 11/25/2024    BUN 14 11/25/2024    CREATININE 0.55 (L) 11/25/2024    EGFRIFNONA 111 05/12/2021    EGFRIFAFRI 132 11/03/2020    BCR 25 (H) 11/25/2024    K 3.8 11/25/2024    CO2 24 11/25/2024    CALCIUM 10.2 11/25/2024    ALBUMIN 4.6 11/25/2024    AST 82 (H) 11/25/2024     (H) 11/25/2024       Lab Results   Component Value Date    CHOL 228 (H) 07/18/2016    CHLPL 214 (H) 11/25/2024    TRIG 177 (H) 11/25/2024    HDL 47 11/25/2024     (H) 11/25/2024       Lab Results   Component Value Date    TSH 1.380 11/25/2024           Assessment & Plan   Diagnoses and all orders for this visit:    1. Uncontrolled type 2 diabetes mellitus with hyperglycemia (Primary)  -     POC Glycosylated Hemoglobin (Hb A1C)  -     Tirzepatide 2.5 MG/0.5ML solution auto-injector; Inject 2.5 mg under the skin into the appropriate area as directed 1 (One) Time Per Week. CONTACT PCP FOR REFILL DOSE  Dispense: 2 mL; Refill: 0    2. Mixed hyperlipidemia  -     atorvastatin (LIPITOR) 20 MG tablet; Take 1 tablet by mouth Every Night.  Dispense: 90 tablet; Refill: 3    3. Essential  hypertension    4. Class 3 severe obesity due to excess calories with serious comorbidity and body mass index (BMI) of 40.0 to 44.9 in adult  -     phentermine (ADIPEX-P) 37.5 MG tablet; 1 po ad or 1/2 po bid  Dispense: 30 tablet; Refill: 0  -     Tirzepatide 2.5 MG/0.5ML solution auto-injector; Inject 2.5 mg under the skin into the appropriate area as directed 1 (One) Time Per Week. CONTACT PCP FOR REFILL DOSE  Dispense: 2 mL; Refill: 0    5. Fatty liver  -     Tirzepatide 2.5 MG/0.5ML solution auto-injector; Inject 2.5 mg under the skin into the appropriate area as directed 1 (One) Time Per Week. CONTACT PCP FOR REFILL DOSE  Dispense: 2 mL; Refill: 0    6. Fibromyalgia    7. Encounter for screening mammogram for malignant neoplasm of breast  -     Mammo Screening Digital Tomosynthesis Bilateral With CAD; Future    8. Pneumococcal vaccination declined by patient    9. Maculopapular rash  -     predniSONE (DELTASONE) 10 MG tablet; 5 po day 1, then decrease by 1 tablet each day until gone (5,4,3,2,1)  Dispense: 15 tablet; Refill: 0       NIDDM uncontrolled - continue onglyza, glipizide. Continue statin. I have reviewed risks/benefits and potential side effects of various treatment options. Pt voices understanding and wishes to proceed with Mounjaro if covered by insurance. Patient encouraged to take meds as rx'd, follow diabetic appropriate diet, exercise daily, perform feet check daily, and have yearly diabetic eye exams.    Risks/benefits and potential side effects of various treatment options for obesity with suspected MAT, SANTIAGO, etc have been reviewed with patient.  I have also reviewed the necessity of consistent lifestyle modification (including willingness to exercise on a regular basis and adopt healthful dietary habits) in order to attain and maintain a healthy BMI.  Patient voiced understanding and wishes to proceed with GLP med if covered by insurance as well as phentermine as this has been helpful for  initiation of weight loss in past. Informational handout specific to this medication has been provided for patient review.  FDA Medication Guide provided as indicated.    As part of patient's treatment plan I am prescribing a controlled substance.  The patient has been made aware of the appropriate use of such medications, including potential risk of somnolence, limited ability to drive and/or work safely, and potential for dependence and/or overdose.  It has also been made clear that these medications are for use by this patient only, without concomitant use of alcohol or other substances, unless prescribed.  MARICRUZ reviewed.  Patient has completed a prescribing agreement detailing terms of continued prescribing of controlled substances, including monitoring MARICRUZ reports, urine drug screening, and pill counts if necessary.  Patient is aware that inappropriate use will result in cessation of prescribing such medications.    Nutrition and activity goals reviewed including: mainly water to drink, limit white flour/processed sugar, higher lean protein, high fiber carbs, regular meals, working toward 150 mins cardio per week.    FMSx stable - continue muscle relaxant, NSAID prn    Hypertension controlled-continue chlorthalidone    Hyperlipidemia-LDL not quite at goal trial of increased dose of atorvastatin.  Encouraged heart healthy eating and daily exercise.    F/u in 3 months, sooner as needed.  Patient was encouraged to keep me informed of any acute changes, lack of improvement, or any new concerning symptoms.  Pt is aware of reasons to seek emergent care.  Patient voiced understanding of all instructions and denied further questions.    Please note that portions of this note may have been completed with a voice recognition program.

## 2025-03-04 RX ORDER — POTASSIUM CHLORIDE 1500 MG/1
20 TABLET, EXTENDED RELEASE ORAL DAILY
Qty: 90 TABLET | Refills: 0 | Status: SHIPPED | OUTPATIENT
Start: 2025-03-04

## 2025-03-04 RX ORDER — MELOXICAM 15 MG/1
15 TABLET ORAL DAILY
Qty: 90 TABLET | Refills: 0 | OUTPATIENT
Start: 2025-03-04

## 2025-03-04 RX ORDER — TIZANIDINE HYDROCHLORIDE 6 MG/1
6 CAPSULE, GELATIN COATED ORAL 3 TIMES DAILY
Qty: 90 CAPSULE | Refills: 3 | Status: SHIPPED | OUTPATIENT
Start: 2025-03-04

## 2025-03-06 PROBLEM — R11.10 NON-INTRACTABLE VOMITING: Status: RESOLVED | Noted: 2020-08-19 | Resolved: 2025-03-06

## 2025-03-06 RX ORDER — PREDNISONE 10 MG/1
TABLET ORAL
Qty: 15 TABLET | Refills: 0 | Status: SHIPPED | OUTPATIENT
Start: 2025-03-06

## 2025-03-06 RX ORDER — ATORVASTATIN CALCIUM 20 MG/1
20 TABLET, FILM COATED ORAL NIGHTLY
Qty: 90 TABLET | Refills: 3 | Status: SHIPPED | OUTPATIENT
Start: 2025-03-06

## 2025-04-08 ENCOUNTER — OFFICE VISIT (OUTPATIENT)
Dept: NEUROLOGY | Facility: CLINIC | Age: 50
End: 2025-04-08
Payer: COMMERCIAL

## 2025-04-08 VITALS
TEMPERATURE: 97.1 F | OXYGEN SATURATION: 98 % | SYSTOLIC BLOOD PRESSURE: 179 MMHG | DIASTOLIC BLOOD PRESSURE: 107 MMHG | BODY MASS INDEX: 42.99 KG/M2 | WEIGHT: 258 LBS | HEIGHT: 65 IN | HEART RATE: 114 BPM

## 2025-04-08 DIAGNOSIS — G47.9 RESTLESS SLEEPER: ICD-10-CM

## 2025-04-08 DIAGNOSIS — E11.65 UNCONTROLLED TYPE 2 DIABETES MELLITUS WITH HYPERGLYCEMIA: ICD-10-CM

## 2025-04-08 DIAGNOSIS — I10 ESSENTIAL HYPERTENSION: ICD-10-CM

## 2025-04-08 DIAGNOSIS — K76.0 FATTY LIVER: ICD-10-CM

## 2025-04-08 DIAGNOSIS — R06.83 SNORING: Primary | ICD-10-CM

## 2025-04-08 DIAGNOSIS — E66.813 CLASS 3 SEVERE OBESITY DUE TO EXCESS CALORIES WITH SERIOUS COMORBIDITY AND BODY MASS INDEX (BMI) OF 40.0 TO 44.9 IN ADULT: ICD-10-CM

## 2025-04-08 DIAGNOSIS — E78.2 MIXED HYPERLIPIDEMIA: ICD-10-CM

## 2025-04-08 DIAGNOSIS — E66.01 CLASS 3 SEVERE OBESITY DUE TO EXCESS CALORIES WITH SERIOUS COMORBIDITY AND BODY MASS INDEX (BMI) OF 40.0 TO 44.9 IN ADULT: ICD-10-CM

## 2025-04-08 PROCEDURE — 99203 OFFICE O/P NEW LOW 30 MIN: CPT | Performed by: NURSE PRACTITIONER

## 2025-04-08 RX ORDER — PHENTERMINE HYDROCHLORIDE 37.5 MG/1
TABLET ORAL
Qty: 30 TABLET | Refills: 0 | Status: SHIPPED | OUTPATIENT
Start: 2025-04-08

## 2025-04-08 NOTE — TELEPHONE ENCOUNTER
Rx Refill Note  Requested Prescriptions     Pending Prescriptions Disp Refills    phentermine (ADIPEX-P) 37.5 MG tablet 30 tablet 0     Si po ad or 1/2 po bid    Tirzepatide 2.5 MG/0.5ML solution auto-injector 2 mL 0     Sig: Inject 2.5 mg under the skin into the appropriate area as directed 1 (One) Time Per Week. CONTACT PCP FOR REFILL DOSE      Last office visit with prescribing clinician: 3/3/2025   Last telemedicine visit with prescribing clinician: Visit date not found   Next office visit with prescribing clinician: 2025                         Would you like a call back once the refill request has been completed: [] Yes [] No    If the office needs to give you a call back, can they leave a voicemail: [] Yes [] No    [place] MA  25, 14:35 EDT

## 2025-04-08 NOTE — TELEPHONE ENCOUNTER
MARICRUZ reviewed. Refill approved. Medication E rx'd to pharmacy as requested. Pt to keep f/u apt as scheduled.

## 2025-04-08 NOTE — PROGRESS NOTES
New Sleep Patient Office Visit      Patient Name: Madalyn Villegas  : 1975   MRN: [id number]     Referring Physician: Latrice Santana MD    Chief Complaint:    Chief Complaint   Patient presents with    Consult     NP, in office to establish care for mat. Patient c/o insomnia that comes and goes, snoring gasping for air.             History of Present Illness: Madalyn Villegas is a 49 y.o. female who is here today to establish care with Sleep Medicine for possible MTA.  Sleep questionnaire reviewed- the time it takes her to fall asleep varies, she wakes up 2-3 times during sleep and is able to fall asleep, she has pain that interferes with sleep, she sleeps 6-7 hours per night, she normally feels rested upon awakening, she takes a nap on 2 days per week for 1-2 hours, she snores very loudly, she awakens with a dry mouth frequently, she has daytime sleepiness, she grinds her teeth during sleep, she has frequent nightmares, she has leg cramps/spasms during the night, she awakens with a headache intermittently.  She does express concerns about being able to tolerate CPAP therapy due to moving around frequently during sleep, due to pain.    *Her blood pressure is elevated today and she says it is because she forgot to take her blood pressure medication this morning- plans to take it when she returns home.     Pevely Score: 9    Subjective      Review of Systems:   Review of Systems   Musculoskeletal:  Positive for arthralgias.   Psychiatric/Behavioral:  Positive for sleep disturbance.        Past Medical History:   Past Medical History:   Diagnosis Date    Abnormal EKG     Patient states that she saw a cardiologist for previous abnormal EKG, had a cardiac stress test that was normal, and the cardiologist told her that her EKG was abnormal because of her large breasts.  Patient states that she cannot remember the name of the cardiologist.    Allergic     Asthma     ASTHMA LIKE SYMPTOMS WITH SINUS  INFECTION    Cataract 10/21    Class 2 severe obesity due to excess calories with serious comorbidity and body mass index (BMI) of 35.0 to 35.9 in adult 07/18/2016    COVID-19     GERD (gastroesophageal reflux disease)     History of being tatooed     RIGHT ANKLE    History of Papanicolaou smear of cervix 09/2017    WNL        Hyperlipidemia     Hypertension     Irritable bowel syndrome 2021    Kidney stones     Lumbar degenerative disc disease     Migraine     Piercing     bilateral ears    Recurrent biliary colic 08/19/2020    Added automatically from request for surgery [id number]    Tobacco abuse        Past Surgical History:   Past Surgical History:   Procedure Laterality Date    CHOLECYSTECTOMY N/A 8/24/2020    Procedure: CHOLECYSTECTOMY LAPAROSCOPIC;  Surgeon: Amy Lowe MD;  Location: [facility] OR;  Service: General;  Laterality: N/A;    COLONOSCOPY N/A 8/24/2017    Procedure: COLONOSCOPY;  Surgeon: Blas Thapa MD;  Location: [facility] ENDOSCOPY;  Service:     D & C HYSTEROSCOPY ENDOMETRIAL ABLATION N/A 11/17/2017    Procedure: DILATATION AND CURETTAGE HYSTEROSCOPY NOVASURE ENDOMETRIAL ABLATION;  Surgeon: Marin Louie MD;  Location: [facility] OR;  Service:     ENDOSCOPY N/A 8/24/2020    Procedure: ESOPHAGOGASTRODUODENOSCOPY WITH BIOPSY;  Surgeon: Amy Lowe MD;  Location: [facility] OR;  Service: General;  Laterality: N/A;    KNEE ACL RECONSTRUCTION Right 07/2010    WISDOM TOOTH EXTRACTION         Family History:   Family History   Problem Relation Age of Onset    Arthritis Mother     Hyperlipidemia Mother     Hypertension Mother     Obesity Mother     Heart attack Mother 64    Hypertension Father     Heart disease Father     Heart attack Sister 34    Hyperlipidemia Sister     Diabetes Sister         Half sister    Hypertension Sister     Obesity Sister     Heart disease Sister     Heart failure Sister     Obesity Sister     Diabetes Sister         Half sister    Diabetes type II  Sister     Heart disease Sister     Hyperlipidemia Sister     Hypertension Sister     Stomach cancer Maternal Uncle     Cancer Maternal Grandfather        Social History:   Social History     Socioeconomic History    Marital status: Single   Tobacco Use    Smoking status: Former     Current packs/day: 0.00     Average packs/day: 0.8 packs/day for 25.0 years (18.8 ttl pk-yrs)     Types: Cigarettes     Start date: 3/16/1994     Quit date: 3/16/2019     Years since quittin.0     Passive exposure: Past    Smokeless tobacco: Never   Vaping Use    Vaping status: Never Used   Substance and Sexual Activity    Alcohol use: No    Drug use: No    Sexual activity: Not Currently     Partners: Male     Birth control/protection: Surgical       Medications:     Current Outpatient Medications:     acetaminophen (TYLENOL) 500 MG tablet, Take 1 tablet by mouth Every 6 (Six) Hours As Needed for Mild Pain., Disp: , Rfl:     albuterol sulfate  (90 Base) MCG/ACT inhaler, Inhale 2 puffs Every 4 (Four) Hours As Needed for Wheezing., Disp: 18 g, Rfl: 0    atorvastatin (LIPITOR) 20 MG tablet, Take 1 tablet by mouth Every Night., Disp: 90 tablet, Rfl: 3    chlorthalidone (HYGROTON) 25 MG tablet, Take 1 tablet by mouth Daily., Disp: 90 tablet, Rfl: 3    Cholecalciferol (VITAMIN D3) 2000 units tablet, Take  by mouth., Disp: , Rfl:     esomeprazole (NexIUM) 20 MG capsule, Take 1 capsule by mouth 2 (Two) Times a Day., Disp: , Rfl:     fluticasone (Flonase) 50 MCG/ACT nasal spray, Administer 2 sprays into the nostril(s) as directed by provider Daily., Disp: 16 g, Rfl: 2    glipizide (Glucotrol) 10 MG tablet, Take 1 tablet by mouth 2 (Two) Times a Day Before Meals., Disp: 180 tablet, Rfl: 1    glucosamine-chondroitin 500-400 MG capsule capsule, Take 1 capsule by mouth 2 (Two) Times a Day With Meals., Disp: , Rfl:     levocetirizine (XYZAL) 5 MG tablet, Take 1 tablet by mouth Every Evening., Disp: 30 tablet, Rfl: 2    meloxicam (MOBIC) 15  "MG tablet, Take 1 tablet by mouth once daily, Disp: 90 tablet, Rfl: 0    phentermine (ADIPEX-P) 37.5 MG tablet, 1 po ad or 1/2 po bid, Disp: 30 tablet, Rfl: 0    potassium chloride (KLOR-CON M20) 20 MEQ CR tablet, Take 1 tablet by mouth Daily., Disp: 90 tablet, Rfl: 0    predniSONE (DELTASONE) 10 MG tablet, 5 po day 1, then decrease by 1 tablet each day until gone (5,4,3,2,1), Disp: 15 tablet, Rfl: 0    SAXagliptin (Onglyza) 5 MG tablet, Take 1 tablet by mouth Daily., Disp: 90 tablet, Rfl: 1    Tirzepatide 2.5 MG/0.5ML solution auto-injector, Inject 2.5 mg under the skin into the appropriate area as directed 1 (One) Time Per Week. CONTACT PCP FOR REFILL DOSE, Disp: 2 mL, Rfl: 0    TiZANidine (ZANAFLEX) 6 MG capsule, Take 1 capsule by mouth 3 (Three) Times a Day., Disp: 90 capsule, Rfl: 3    vitamin C (ASCORBIC ACID) 250 MG tablet, Take 1 tablet by mouth Daily., Disp: , Rfl:     Zinc Sulfate (Zinc 15) 66 MG tablet, Take  by mouth Daily., Disp: , Rfl:     Allergies:   No Known Allergies    Objective     Physical Exam:  Vital Signs:   Vitals:    04/08/25 1055   BP: (!) 179/107  Comment: patient reported she forgot to take her medicaiton this moring.   BP Location: Left arm   Patient Position: Sitting   Cuff Size: Adult   Pulse: 114   Temp: 97.1 °F (36.2 °C)   SpO2: 98%   Weight: 117 kg (258 lb)   Height: 165.1 cm (65\")   PainSc: 0-No pain     BMI: Body mass index is 42.93 kg/m².  Neck Circumference: 16 inches    Physical Exam  Vitals and nursing note reviewed.   Constitutional:       General: She is not in acute distress.     Appearance: Normal appearance. She is well-developed. She is obese. She is not diaphoretic.   HENT:      Head: Normocephalic and atraumatic.      Mouth/Throat:      Comments: Mallampati 4 with mild retrognathia   Eyes:      Extraocular Movements: Extraocular movements intact.      Conjunctiva/sclera: Conjunctivae normal.   Neck:      Trachea: Trachea normal.   Pulmonary:      Effort: Pulmonary " effort is normal. No respiratory distress.   Musculoskeletal:         General: Normal range of motion.   Skin:     General: Skin is warm and dry.      Comments: Large erythematous areas to upper body- patient says this is her normal skin tone/condition.    Neurological:      Mental Status: She is alert and oriented to person, place, and time.   Psychiatric:         Mood and Affect: Mood normal.         Behavior: Behavior normal.         Thought Content: Thought content normal.         Judgment: Judgment normal.       Assessment / Plan      Assessment/Plan:   Diagnoses and all orders for this visit:    1. Snoring (Primary)  -     Home Sleep Study; Future    2. Essential hypertension  -     Home Sleep Study; Future    3. Mixed hyperlipidemia  -     Home Sleep Study; Future    4. Restless sleeper  -     Home Sleep Study; Future       *Patient education on MAT provided today.   *PSG offered but she prefers a home sleep study as she has difficulty sleeping away from home.   *Advised patient to go to the ED for any spots in her vision, vision loss or complaints of the worst headache of her life.       Follow Up:   Return in about 3 months (around 7/8/2025) for F/U Obstructive Sleep Apnea.    I have advised patient the gold standard for treatment of sleep apnea includes weight loss, use of cpap and avoidance of alcohol.  Encouraged weight loss (if applicable) with a BMI goal of 24.  I have discussed the implications of untreated significant MAT- the development of hypertension, increased risk of cardiovascular disease, increased risk of stroke, work-related issues and driving accidents. I have counseled and advised the patient to avoid driving or operating heavy/dangerous equipment if feeling drowsy.     RUFINO Puente, FNP-C  [facility]

## 2025-04-14 ENCOUNTER — PATIENT ROUNDING (BHMG ONLY) (OUTPATIENT)
Dept: NEUROLOGY | Facility: CLINIC | Age: 50
End: 2025-04-14
Payer: COMMERCIAL

## 2025-05-06 DIAGNOSIS — E66.813 CLASS 3 SEVERE OBESITY DUE TO EXCESS CALORIES WITH SERIOUS COMORBIDITY AND BODY MASS INDEX (BMI) OF 40.0 TO 44.9 IN ADULT: ICD-10-CM

## 2025-05-06 DIAGNOSIS — E66.01 CLASS 3 SEVERE OBESITY DUE TO EXCESS CALORIES WITH SERIOUS COMORBIDITY AND BODY MASS INDEX (BMI) OF 40.0 TO 44.9 IN ADULT: ICD-10-CM

## 2025-05-06 RX ORDER — PHENTERMINE HYDROCHLORIDE 37.5 MG/1
TABLET ORAL
Qty: 30 TABLET | Refills: 0 | Status: SHIPPED | OUTPATIENT
Start: 2025-05-06

## 2025-05-06 NOTE — TELEPHONE ENCOUNTER
Rx Refill Note  Requested Prescriptions     Pending Prescriptions Disp Refills    phentermine (ADIPEX-P) 37.5 MG tablet [Pharmacy Med Name: Phentermine HCl 37.5 MG Oral Tablet] 30 tablet 0     Sig: TAKE 1 TABLET BY MOUTH ONCE DAILY OR  0.5  TABLET  TWICE  DAILY      Last office visit with prescribing clinician: 3/3/2025   Last telemedicine visit with prescribing clinician: Visit date not found   Next office visit with prescribing clinician: 6/2/2025    Ok to fill?      Onelia David MA  05/06/25, 10:23 EDT

## 2025-05-21 RX ORDER — POTASSIUM CHLORIDE 1500 MG/1
TABLET, EXTENDED RELEASE ORAL DAILY
Qty: 90 TABLET | Refills: 0 | Status: SHIPPED | OUTPATIENT
Start: 2025-05-21

## 2025-05-24 DIAGNOSIS — M79.7 FIBROMYALGIA: ICD-10-CM

## 2025-05-24 DIAGNOSIS — E66.01 CLASS 3 SEVERE OBESITY DUE TO EXCESS CALORIES WITH SERIOUS COMORBIDITY AND BODY MASS INDEX (BMI) OF 40.0 TO 44.9 IN ADULT: ICD-10-CM

## 2025-05-24 DIAGNOSIS — E66.813 CLASS 3 SEVERE OBESITY DUE TO EXCESS CALORIES WITH SERIOUS COMORBIDITY AND BODY MASS INDEX (BMI) OF 40.0 TO 44.9 IN ADULT: ICD-10-CM

## 2025-05-27 RX ORDER — PHENTERMINE HYDROCHLORIDE 37.5 MG/1
TABLET ORAL
Qty: 30 TABLET | Refills: 0 | OUTPATIENT
Start: 2025-05-27

## 2025-05-27 RX ORDER — MELOXICAM 15 MG/1
15 TABLET ORAL DAILY
Qty: 90 TABLET | Refills: 0 | Status: SHIPPED | OUTPATIENT
Start: 2025-05-27

## 2025-05-27 NOTE — TELEPHONE ENCOUNTER
Rx Refill Note  Requested Prescriptions     Pending Prescriptions Disp Refills    phentermine (ADIPEX-P) 37.5 MG tablet [Pharmacy Med Name: Phentermine HCl 37.5 MG Oral Tablet] 30 tablet 0     Sig: TAKE 1 TABLET BY MOUTH ONCE DAILY OR  0.5  TABLET  TWICE  DAILY.     Signed Prescriptions Disp Refills    meloxicam (MOBIC) 15 MG tablet 90 tablet 0     Sig: Take 1 tablet by mouth once daily     Authorizing Provider: TARA GREGORY     Ordering User: LORENA VINCENT      Last office visit with prescribing clinician: 3/3/2025   Last telemedicine visit with prescribing clinician: Visit date not found   Next office visit with prescribing clinician: 5/24/2025                         Would you like a call back once the refill request has been completed: [] Yes [] No    If the office needs to give you a call back, can they leave a voicemail: [] Yes [] No    Lorena Richey CMA  05/27/25, 09:19 EDT

## 2025-06-02 ENCOUNTER — OFFICE VISIT (OUTPATIENT)
Dept: FAMILY MEDICINE CLINIC | Facility: CLINIC | Age: 50
End: 2025-06-02
Payer: COMMERCIAL

## 2025-06-02 VITALS
HEIGHT: 65 IN | HEART RATE: 81 BPM | DIASTOLIC BLOOD PRESSURE: 78 MMHG | BODY MASS INDEX: 42.45 KG/M2 | SYSTOLIC BLOOD PRESSURE: 126 MMHG | OXYGEN SATURATION: 98 % | WEIGHT: 254.8 LBS

## 2025-06-02 DIAGNOSIS — E66.813 CLASS 3 SEVERE OBESITY DUE TO EXCESS CALORIES WITH SERIOUS COMORBIDITY AND BODY MASS INDEX (BMI) OF 40.0 TO 44.9 IN ADULT: ICD-10-CM

## 2025-06-02 DIAGNOSIS — E11.65 UNCONTROLLED TYPE 2 DIABETES MELLITUS WITH HYPERGLYCEMIA: ICD-10-CM

## 2025-06-02 DIAGNOSIS — E66.01 CLASS 3 SEVERE OBESITY DUE TO EXCESS CALORIES WITH SERIOUS COMORBIDITY AND BODY MASS INDEX (BMI) OF 40.0 TO 44.9 IN ADULT: ICD-10-CM

## 2025-06-02 DIAGNOSIS — M79.7 FIBROMYALGIA: ICD-10-CM

## 2025-06-02 DIAGNOSIS — E11.65 UNCONTROLLED TYPE 2 DIABETES MELLITUS WITH HYPERGLYCEMIA: Primary | ICD-10-CM

## 2025-06-02 DIAGNOSIS — E78.2 MIXED HYPERLIPIDEMIA: ICD-10-CM

## 2025-06-02 DIAGNOSIS — I10 ESSENTIAL HYPERTENSION: ICD-10-CM

## 2025-06-02 DIAGNOSIS — K76.0 FATTY LIVER: ICD-10-CM

## 2025-06-02 LAB
EXPIRATION DATE: ABNORMAL
HBA1C MFR BLD: 7.4 % (ref 4.5–5.7)
Lab: ABNORMAL

## 2025-06-02 PROCEDURE — 83036 HEMOGLOBIN GLYCOSYLATED A1C: CPT | Performed by: FAMILY MEDICINE

## 2025-06-02 PROCEDURE — 99214 OFFICE O/P EST MOD 30 MIN: CPT | Performed by: FAMILY MEDICINE

## 2025-06-02 RX ORDER — GLIPIZIDE 10 MG/1
10 TABLET ORAL
Qty: 180 TABLET | Refills: 1 | Status: SHIPPED | OUTPATIENT
Start: 2025-06-02

## 2025-06-02 RX ORDER — PHENTERMINE HYDROCHLORIDE 37.5 MG/1
TABLET ORAL
Qty: 30 TABLET | Refills: 0 | Status: SHIPPED | OUTPATIENT
Start: 2025-06-02

## 2025-06-02 RX ORDER — PHENTERMINE HYDROCHLORIDE 37.5 MG/1
TABLET ORAL
Qty: 30 TABLET | Refills: 0 | Status: CANCELLED | OUTPATIENT
Start: 2025-06-02

## 2025-06-02 NOTE — PROGRESS NOTES
Subjective   Madalyn Villegas is a 50 y.o. female.     History of Present Illness  Here for routine f/u on several chronic med problems.  Diabetes  Visit type:  Follow-up  Diabetes type:  Type 2  Associated symptoms:     no chest pain, no fatigue, no polydipsia, no polyuria, no visual change and no weakness    Hypoglycemia symptoms:     no confusion, no dizziness, no headaches and no tremors      Follow up and a1c check  Symptoms are: chronic.   Onset was 1 to 5 years.   Symptoms occur: constantly.  Symptoms include: rash.   Pertinent negative symptoms include no abdominal pain, no anorexia, no joint pain, no change in stool, no chest pain, no chills, no congestion, no cough, no diaphoresis, no fatigue, no fever, no headaches, no joint swelling, no myalgias, no nausea, no neck pain, no numbness, no sore throat, no swollen glands, no dysuria, no vertigo, no visual change, no vomiting and no weakness.   Treatment and/or Medications comments include: Hydrocortisone cream and anti itch spray     Here for routine follow-up on her chronic problems:  IDDM uncontrolled-currently on glipizide, Onglyza. No longer able to afford GLP med. Not able to tolerate metformin. Since last visit has been working to improve dietary habits. No regular exercise.  Hyperlipidemia-taking low-dose Lipitor.  Tolerating well.  Not following heart healthy diet.  Chronic morbid obesity with fatty liver-struggles with caloric restriction.  Has had significant weight loss in the past with lifestyle modification, medical management.  Difficulty affording GLP medications at this time. Currently on Phentermine. Weight down 12 lbs over past 3 months. Advised to have hepatic US, f/u labs, see specialist. She does not feel she is able to afford it at this time.  Fibromyalgia-symptoms generally stable.  Using muscle relaxant as needed, Mobic.  Multiple allergies, chronic rash-flares with heat, certain exposures.  Responds to topical corticosteroid if mild  but not once flared.  Previous dx of MAT - not able to afford CPAP     She is overdue for several preventive care measures including breast cancer screening, pneumococcal vaccination, diabetic eye exam, cervical cancer screening.  She declines all of the above other than mammogram which is scheduled.  Encouraged to schedule yearly eye exam.     Pt's previous HPI reviewed and updated as indicated.     The following portions of the patient's history were reviewed and updated as appropriate: allergies, current medications, past family history, past medical history, past social history, past surgical history, and problem list.    Review of Systems   Constitutional:  Negative for chills, diaphoresis, fatigue and fever.   HENT:  Positive for postnasal drip and sinus pressure. Negative for congestion, mouth sores, nosebleeds, rhinorrhea, sore throat and trouble swallowing.    Eyes:  Negative for pain, discharge, redness and visual disturbance.   Respiratory:  Positive for shortness of breath (mild with exertion). Negative for cough and wheezing.    Cardiovascular:  Negative for chest pain, palpitations and leg swelling.   Gastrointestinal:  Positive for constipation and diarrhea. Negative for abdominal pain, anorexia, blood in stool, nausea and vomiting.        Heartburn   Endocrine: Positive for heat intolerance. Negative for polydipsia and polyuria.   Genitourinary:  Negative for dysuria and hematuria.   Musculoskeletal:  Positive for arthralgias and back pain. Negative for joint pain, myalgias and neck pain.   Skin:  Positive for rash. Negative for wound.   Neurological:  Negative for dizziness, vertigo, tremors, weakness, numbness and headaches.   Hematological:  Negative for adenopathy. Bruises/bleeds easily.   Psychiatric/Behavioral:  Negative for confusion, dysphoric mood and sleep disturbance.    Pt's previous ROS reviewed and updated as indicated.       Objective   Vitals:    06/02/25 0808   BP: 126/78   Pulse: 81    SpO2: 98%     Body mass index is 42.4 kg/m².      06/02/25  0808   Weight: 116 kg (254 lb 12.8 oz)       Physical Exam  Vitals and nursing note reviewed.   Constitutional:       Appearance: She is well-groomed. She is morbidly obese. She is not ill-appearing.   HENT:      Head: Atraumatic.      Mouth/Throat:      Mouth: Mucous membranes are moist.      Comments: Crowded posterior oropharynx  Eyes:      General: No scleral icterus.     Conjunctiva/sclera: Conjunctivae normal.   Neck:      Thyroid: No thyroid mass.   Cardiovascular:      Rate and Rhythm: Normal rate and regular rhythm.      Pulses: Normal pulses.      Heart sounds: Normal heart sounds.   Pulmonary:      Effort: Pulmonary effort is normal.      Breath sounds: Decreased breath sounds (mildly) present. No wheezing, rhonchi or rales.   Musculoskeletal:      Right lower leg: No edema.      Left lower leg: No edema.   Lymphadenopathy:      Cervical: No cervical adenopathy.   Skin:     General: Skin is warm and dry.      Coloration: Skin is not jaundiced or pale.      Findings: Rash (scattered erythematous maculopapular) present. No bruising.          Neurological:      Mental Status: She is alert and oriented to person, place, and time.      Motor: No tremor.      Gait: Gait is intact.   Psychiatric:         Mood and Affect: Mood and affect normal.         Behavior: Behavior normal. Behavior is cooperative.         Cognition and Memory: Cognition normal.     Pt's previous physical exam reviewed and updated as indicated.    Lab Results   Component Value Date    HGBA1C 7.4 (A) 06/02/2025    HGBA1C 8.2 (A) 03/03/2025    HGBA1C 7.5 (H) 11/25/2024     Lab Results   Component Value Date    MICROALBUR 14.2 11/25/2024    CREATININE 0.55 (L) 11/25/2024     Lab Results   Component Value Date    WBC 10.7 11/25/2024    HGB 15.0 11/25/2024    HCT 44.7 11/25/2024    MCV 82 11/25/2024     11/25/2024       Lab Results   Component Value Date    GLUCOSE 128 (H)  11/25/2024    BUN 14 11/25/2024    CREATININE 0.55 (L) 11/25/2024    EGFRIFNONA 111 05/12/2021    EGFRIFAFRI 132 11/03/2020    BCR 25 (H) 11/25/2024    K 3.8 11/25/2024    CO2 24 11/25/2024    CALCIUM 10.2 11/25/2024    ALBUMIN 4.6 11/25/2024    AST 82 (H) 11/25/2024     (H) 11/25/2024       Lab Results   Component Value Date    CHOL 228 (H) 07/18/2016    CHLPL 214 (H) 11/25/2024    TRIG 177 (H) 11/25/2024    HDL 47 11/25/2024     (H) 11/25/2024     Lab Results   Component Value Date    TSH 1.380 11/25/2024       Assessment & Plan   Diagnoses and all orders for this visit:    1. Uncontrolled type 2 diabetes mellitus with hyperglycemia (Primary)  -     POC Glycosylated Hemoglobin (Hb A1C)    2. Mixed hyperlipidemia    3. Essential hypertension    4. Class 3 severe obesity due to excess calories with serious comorbidity and body mass index (BMI) of 40.0 to 44.9 in adult  -     phentermine (ADIPEX-P) 37.5 MG tablet; TAKE 1 TABLET BY MOUTH ONCE DAILY OR  0.5  TABLET  TWICE  DAILY  Dispense: 30 tablet; Refill: 0    5. Fatty liver    6. Fibromyalgia       NIDDM with improved control. Continue glipizide, metformin, statin. Patient encouraged to take meds as rx'd, follow diabetic appropriate diet, exercise daily, perform feet check daily, and have yearly diabetic eye exams.    HLP - continue lipitor. Pt advised to eat a heart healthy diet and get regular aerobic exercise.    HTN controlled - continue chlorthalidone    Obesity with fatty liver, MAT, NIDDM - good job of lifestyle modification since last visit. Continue phentermine. Denies side effects. Nutrition and activity goals reviewed including: mainly water to drink, limit white flour/processed sugar, higher lean protein, high fiber carbs, regular meals, working toward 150 mins cardio per week, resistance training 2x/week.  As part of patient's treatment plan I am prescribing a controlled substance.  The patient has been made aware of the appropriate use  of such medications, including potential risk of somnolence, limited ability to drive and/or work safely, and potential for dependence and/or overdose.  It has also been made clear that these medications are for use by this patient only, without concomitant use of alcohol or other substances, unless prescribed.  History and physical exam exhibit continued safe and appropriate use of controlled substance.  MARICRUZ reviewed.    F/u in 3 months, sooner as needed.  Patient was encouraged to keep me informed of any acute changes, lack of improvement, or any new concerning symptoms.  Pt is aware of reasons to seek emergent care.  Patient voiced understanding of all instructions and denied further questions.    Please note that portions of this note may have been completed with a voice recognition program.

## 2025-06-02 NOTE — TELEPHONE ENCOUNTER
Rx Refill Note  Requested Prescriptions     Pending Prescriptions Disp Refills    phentermine (ADIPEX-P) 37.5 MG tablet 30 tablet 0     Sig: TAKE 1 TABLET BY MOUTH ONCE DAILY OR  0.5  TABLET  TWICE  DAILY     Signed Prescriptions Disp Refills    glipizide (Glucotrol) 10 MG tablet 180 tablet 1     Sig: Take 1 tablet by mouth 2 (Two) Times a Day Before Meals.     Authorizing Provider: TARA GREGORY     Ordering User: EMILY BLANCO      Last office visit with prescribing clinician: 3/3/2025   Last telemedicine visit with prescribing clinician: Visit date not found   Next office visit with prescribing clinician: 6/2/2025                         Would you like a call back once the refill request has been completed: [] Yes [] No    If the office needs to give you a call back, can they leave a voicemail: [] Yes [] No    Emily Blanco MA  06/02/25, 10:35 EDT

## 2025-06-16 ENCOUNTER — HOSPITAL ENCOUNTER (OUTPATIENT)
Dept: MAMMOGRAPHY | Facility: HOSPITAL | Age: 50
Discharge: HOME | End: 2025-06-16
Admitting: FAMILY MEDICINE
Payer: COMMERCIAL

## 2025-06-16 DIAGNOSIS — Z12.31 ENCOUNTER FOR SCREENING MAMMOGRAM FOR MALIGNANT NEOPLASM OF BREAST: ICD-10-CM

## 2025-06-16 PROCEDURE — 77067 SCR MAMMO BI INCL CAD: CPT

## 2025-06-16 PROCEDURE — 77063 BREAST TOMOSYNTHESIS BI: CPT

## 2025-08-21 DIAGNOSIS — M79.7 FIBROMYALGIA: ICD-10-CM

## 2025-08-21 RX ORDER — POTASSIUM CHLORIDE 1500 MG/1
TABLET, EXTENDED RELEASE ORAL DAILY
Qty: 90 TABLET | Refills: 0 | Status: SHIPPED | OUTPATIENT
Start: 2025-08-21

## 2025-08-21 RX ORDER — MELOXICAM 15 MG/1
15 TABLET ORAL DAILY
Qty: 90 TABLET | Refills: 0 | Status: SHIPPED | OUTPATIENT
Start: 2025-08-21

## (undated) DEVICE — DRSNG SURESITE WNDW 4X4.5

## (undated) DEVICE — FRCP BIOP RADLJAW4 HOT 2.2X240 BX40

## (undated) DEVICE — COUNT NDL FOAM STRIP W/MAG 60CT

## (undated) DEVICE — NDL HYPO ECLPS SFTY 22G 1 1/2IN

## (undated) DEVICE — UNDYED BRAIDED (POLYGLACTIN 910), SYNTHETIC ABSORBABLE SUTURE: Brand: COATED VICRYL

## (undated) DEVICE — ENDOPATH XCEL BLADELESS TROCARS WITH STABILITY SLEEVES: Brand: ENDOPATH XCEL

## (undated) DEVICE — DISPOSABLE MONOPOLAR ENDOSCOPIC CORD 10 FT. (3M): Brand: KIRWAN

## (undated) DEVICE — SOL NACL 0.9PCT 1000ML

## (undated) DEVICE — PAD GRND REM POLYHESIVE A/ DISP

## (undated) DEVICE — PROB ABL ENDOMTRL NOVASURE/G3 IMPEDENCE 1P/U

## (undated) DEVICE — SYR LL TP 10ML STRL

## (undated) DEVICE — CONMED SCOPE SAVER BITE BLOCK, 20X27 MM: Brand: SCOPE SAVER

## (undated) DEVICE — Device

## (undated) DEVICE — ENDOPOUCH RETRIEVER SPECIMEN RETRIEVAL BAGS: Brand: ENDOPOUCH RETRIEVER

## (undated) DEVICE — GLV SURG ULTRATOUCH BIOGEL/COAT PF LF SZ6 STRL

## (undated) DEVICE — ENDOGATOR AUXILIARY WATER JET CONNECTOR: Brand: ENDOGATOR

## (undated) DEVICE — VLV SXN AIR/H2O ORCAPOD3 1P/U STRL

## (undated) DEVICE — RICH MINOR LITHOTOMY: Brand: MEDLINE INDUSTRIES, INC.

## (undated) DEVICE — BNDG ADHS PLSTC 1X3IN LF

## (undated) DEVICE — SYR LUER SLPTP 50ML

## (undated) DEVICE — GOWN,SIRUS,NON REINFRCD,LARGE,SET IN SL: Brand: MEDLINE

## (undated) DEVICE — SUT VIC 0/0 UR6 27IN DYED J603H

## (undated) DEVICE — CUFF SCD HEMOFORCE SEQ CALF STD MD

## (undated) DEVICE — MARKR SKIN W/RULR

## (undated) DEVICE — MONOPOLAR METZENBAUM SCISSOR, MINI BLADE TIP, DISPOSABLE: Brand: MONOPOLAR METZENBAUM SCISSOR, MINI BLADE TIP, DISPOSABLE

## (undated) DEVICE — 2, DISPOSABLE SUCTION/IRRIGATOR WITHOUT DISPOSABLE TIP: Brand: STRYKEFLOW

## (undated) DEVICE — ENDOPATH XCEL UNIVERSAL TROCAR STABLILITY SLEEVES: Brand: ENDOPATH XCEL

## (undated) DEVICE — LUBE JELLY PK/2.75GM STRL BX/144

## (undated) DEVICE — GLV SURG SENSICARE GREEN W/ALOE PF LF 6 STRL

## (undated) DEVICE — YANKAUER,BULB TIP, NO VENT: Brand: ARGYLE

## (undated) DEVICE — GOWN,PREVENTION PLUS,XLARGE,STERILE: Brand: MEDLINE

## (undated) DEVICE — ST IRR CYSTO W/SPK 77IN LF

## (undated) DEVICE — ENDOPATH XCEL BLUNT TIP TROCARS WITH SMOOTH SLEEVES: Brand: ENDOPATH XCEL

## (undated) DEVICE — SUCTION CANISTER, 1500CC, RIGID: Brand: DEROYAL

## (undated) DEVICE — JELLY,LUBE,STERILE,FLIP TOP,TUBE,2-OZ: Brand: MEDLINE

## (undated) DEVICE — MONOPOLAR METZENBAUM SCISSOR TIP, DISPOSABLE: Brand: MONOPOLAR METZENBAUM SCISSOR TIP, DISPOSABLE

## (undated) DEVICE — ADHS LIQ MASTISOL 2/3ML

## (undated) DEVICE — GLV SURG BIOGEL M LTX PF 8

## (undated) DEVICE — GLV SURG SENSICARE W/ALOE PF LF 8.5 STRL

## (undated) DEVICE — TP ELECTRD LAP L WR SPLIT33CM

## (undated) DEVICE — PAD SANI MAXI W/ADHS SNG WRP 11IN

## (undated) DEVICE — SPNG GZ STRL 2S 4X4 12PLY

## (undated) DEVICE — GLV SURG SENSICARE W/ALOE PF LF 6.5 STRL

## (undated) DEVICE — RICH GENERAL LAPAROSCOPY: Brand: MEDLINE INDUSTRIES, INC.

## (undated) DEVICE — SUT GUT CHRM 2/0 SH 27IN G123H